# Patient Record
Sex: MALE | Race: WHITE | NOT HISPANIC OR LATINO | Employment: OTHER | URBAN - METROPOLITAN AREA
[De-identification: names, ages, dates, MRNs, and addresses within clinical notes are randomized per-mention and may not be internally consistent; named-entity substitution may affect disease eponyms.]

---

## 2018-07-05 ENCOUNTER — APPOINTMENT (OUTPATIENT)
Dept: GENERAL RADIOLOGY | Facility: HOSPITAL | Age: 58
End: 2018-07-05

## 2018-07-05 ENCOUNTER — HOSPITAL ENCOUNTER (OUTPATIENT)
Facility: HOSPITAL | Age: 58
Setting detail: OBSERVATION
Discharge: HOME | End: 2018-07-09
Attending: EMERGENCY MEDICINE | Admitting: EMERGENCY MEDICINE

## 2018-07-05 DIAGNOSIS — J96.01 ACUTE RESPIRATORY FAILURE WITH HYPOXIA (HCC): Primary | ICD-10-CM

## 2018-07-05 DIAGNOSIS — J44.1 COPD WITH ACUTE EXACERBATION (HCC): ICD-10-CM

## 2018-07-05 LAB
ALBUMIN SERPL-MCNC: 4.5 G/DL (ref 3.5–5.2)
ALBUMIN/GLOB SERPL: 1.5 G/DL
ALP SERPL-CCNC: 58 U/L (ref 39–117)
ALT SERPL W P-5'-P-CCNC: 26 U/L (ref 1–41)
ANION GAP SERPL CALCULATED.3IONS-SCNC: 10.1 MMOL/L
AST SERPL-CCNC: 19 U/L (ref 1–40)
BASOPHILS # BLD AUTO: 0.05 10*3/MM3 (ref 0–0.2)
BASOPHILS NFR BLD AUTO: 0.5 % (ref 0–1.5)
BILIRUB SERPL-MCNC: 0.2 MG/DL (ref 0.1–1.2)
BUN BLD-MCNC: 17 MG/DL (ref 6–20)
BUN/CREAT SERPL: 23.9 (ref 7–25)
CALCIUM SPEC-SCNC: 9.3 MG/DL (ref 8.6–10.5)
CHLORIDE SERPL-SCNC: 98 MMOL/L (ref 98–107)
CO2 SERPL-SCNC: 31.9 MMOL/L (ref 22–29)
CREAT BLD-MCNC: 0.71 MG/DL (ref 0.76–1.27)
DEPRECATED RDW RBC AUTO: 50.4 FL (ref 37–54)
EOSINOPHIL # BLD AUTO: 0.26 10*3/MM3 (ref 0–0.7)
EOSINOPHIL NFR BLD AUTO: 2.7 % (ref 0.3–6.2)
ERYTHROCYTE [DISTWIDTH] IN BLOOD BY AUTOMATED COUNT: 14.6 % (ref 11.5–14.5)
GFR SERPL CREATININE-BSD FRML MDRD: 114 ML/MIN/1.73
GLOBULIN UR ELPH-MCNC: 3 GM/DL
GLUCOSE BLD-MCNC: 85 MG/DL (ref 65–99)
HCT VFR BLD AUTO: 51.1 % (ref 40.4–52.2)
HGB BLD-MCNC: 17.2 G/DL (ref 13.7–17.6)
IMM GRANULOCYTES # BLD: 0.03 10*3/MM3 (ref 0–0.03)
IMM GRANULOCYTES NFR BLD: 0.3 % (ref 0–0.5)
LIPASE SERPL-CCNC: 30 U/L (ref 13–60)
LYMPHOCYTES # BLD AUTO: 2.84 10*3/MM3 (ref 0.9–4.8)
LYMPHOCYTES NFR BLD AUTO: 29.4 % (ref 19.6–45.3)
MCH RBC QN AUTO: 32 PG (ref 27–32.7)
MCHC RBC AUTO-ENTMCNC: 33.7 G/DL (ref 32.6–36.4)
MCV RBC AUTO: 95 FL (ref 79.8–96.2)
MONOCYTES # BLD AUTO: 0.98 10*3/MM3 (ref 0.2–1.2)
MONOCYTES NFR BLD AUTO: 10.1 % (ref 5–12)
NEUTROPHILS # BLD AUTO: 5.54 10*3/MM3 (ref 1.9–8.1)
NEUTROPHILS NFR BLD AUTO: 57.3 % (ref 42.7–76)
NT-PROBNP SERPL-MCNC: 16 PG/ML (ref 5–900)
PLATELET # BLD AUTO: 271 10*3/MM3 (ref 140–500)
PMV BLD AUTO: 9.8 FL (ref 6–12)
POTASSIUM BLD-SCNC: 4.1 MMOL/L (ref 3.5–5.2)
PROT SERPL-MCNC: 7.5 G/DL (ref 6–8.5)
RBC # BLD AUTO: 5.38 10*6/MM3 (ref 4.6–6)
SODIUM BLD-SCNC: 140 MMOL/L (ref 136–145)
TROPONIN T SERPL-MCNC: <0.01 NG/ML (ref 0–0.03)
WBC NRBC COR # BLD: 9.67 10*3/MM3 (ref 4.5–10.7)

## 2018-07-05 PROCEDURE — 94799 UNLISTED PULMONARY SVC/PX: CPT

## 2018-07-05 PROCEDURE — 80053 COMPREHEN METABOLIC PANEL: CPT | Performed by: EMERGENCY MEDICINE

## 2018-07-05 PROCEDURE — 83880 ASSAY OF NATRIURETIC PEPTIDE: CPT | Performed by: EMERGENCY MEDICINE

## 2018-07-05 PROCEDURE — 96374 THER/PROPH/DIAG INJ IV PUSH: CPT

## 2018-07-05 PROCEDURE — 83690 ASSAY OF LIPASE: CPT | Performed by: EMERGENCY MEDICINE

## 2018-07-05 PROCEDURE — 93010 ELECTROCARDIOGRAM REPORT: CPT | Performed by: INTERNAL MEDICINE

## 2018-07-05 PROCEDURE — 84484 ASSAY OF TROPONIN QUANT: CPT | Performed by: EMERGENCY MEDICINE

## 2018-07-05 PROCEDURE — 93005 ELECTROCARDIOGRAM TRACING: CPT | Performed by: EMERGENCY MEDICINE

## 2018-07-05 PROCEDURE — 99284 EMERGENCY DEPT VISIT MOD MDM: CPT

## 2018-07-05 PROCEDURE — 25010000002 METHYLPREDNISOLONE PER 125 MG: Performed by: EMERGENCY MEDICINE

## 2018-07-05 PROCEDURE — G0378 HOSPITAL OBSERVATION PER HR: HCPCS

## 2018-07-05 PROCEDURE — 71046 X-RAY EXAM CHEST 2 VIEWS: CPT

## 2018-07-05 PROCEDURE — 94640 AIRWAY INHALATION TREATMENT: CPT

## 2018-07-05 PROCEDURE — 85025 COMPLETE CBC W/AUTO DIFF WBC: CPT | Performed by: EMERGENCY MEDICINE

## 2018-07-05 RX ORDER — PREDNISONE 20 MG/1
40 TABLET ORAL
Status: DISCONTINUED | OUTPATIENT
Start: 2018-07-06 | End: 2018-07-09 | Stop reason: HOSPADM

## 2018-07-05 RX ORDER — AZITHROMYCIN 250 MG/1
500 TABLET, FILM COATED ORAL ONCE
Status: COMPLETED | OUTPATIENT
Start: 2018-07-05 | End: 2018-07-06

## 2018-07-05 RX ORDER — METHYLPREDNISOLONE SODIUM SUCCINATE 125 MG/2ML
125 INJECTION, POWDER, LYOPHILIZED, FOR SOLUTION INTRAMUSCULAR; INTRAVENOUS ONCE
Status: COMPLETED | OUTPATIENT
Start: 2018-07-05 | End: 2018-07-05

## 2018-07-05 RX ORDER — IPRATROPIUM BROMIDE AND ALBUTEROL SULFATE 2.5; .5 MG/3ML; MG/3ML
3 SOLUTION RESPIRATORY (INHALATION) EVERY 4 HOURS PRN
Status: DISCONTINUED | OUTPATIENT
Start: 2018-07-05 | End: 2018-07-09 | Stop reason: HOSPADM

## 2018-07-05 RX ORDER — IPRATROPIUM BROMIDE AND ALBUTEROL SULFATE 2.5; .5 MG/3ML; MG/3ML
3 SOLUTION RESPIRATORY (INHALATION) ONCE
Status: COMPLETED | OUTPATIENT
Start: 2018-07-05 | End: 2018-07-05

## 2018-07-05 RX ORDER — ACETAMINOPHEN 325 MG/1
650 TABLET ORAL EVERY 4 HOURS PRN
Status: DISCONTINUED | OUTPATIENT
Start: 2018-07-05 | End: 2018-07-09 | Stop reason: HOSPADM

## 2018-07-05 RX ORDER — IPRATROPIUM BROMIDE AND ALBUTEROL SULFATE 2.5; .5 MG/3ML; MG/3ML
3 SOLUTION RESPIRATORY (INHALATION)
Status: DISCONTINUED | OUTPATIENT
Start: 2018-07-05 | End: 2018-07-09 | Stop reason: HOSPADM

## 2018-07-05 RX ORDER — AZITHROMYCIN 250 MG/1
250 TABLET, FILM COATED ORAL
Status: COMPLETED | OUTPATIENT
Start: 2018-07-06 | End: 2018-07-09

## 2018-07-05 RX ORDER — GUAIFENESIN 400 MG/1
400 TABLET ORAL 2 TIMES DAILY
COMMUNITY
End: 2018-09-26 | Stop reason: HOSPADM

## 2018-07-05 RX ORDER — SODIUM CHLORIDE 0.9 % (FLUSH) 0.9 %
10 SYRINGE (ML) INJECTION AS NEEDED
Status: DISCONTINUED | OUTPATIENT
Start: 2018-07-05 | End: 2018-07-09 | Stop reason: HOSPADM

## 2018-07-05 RX ORDER — SODIUM CHLORIDE 0.9 % (FLUSH) 0.9 %
1-10 SYRINGE (ML) INJECTION AS NEEDED
Status: DISCONTINUED | OUTPATIENT
Start: 2018-07-05 | End: 2018-07-09 | Stop reason: HOSPADM

## 2018-07-05 RX ADMIN — IPRATROPIUM BROMIDE AND ALBUTEROL SULFATE 3 ML: .5; 3 SOLUTION RESPIRATORY (INHALATION) at 21:17

## 2018-07-05 RX ADMIN — METHYLPREDNISOLONE SODIUM SUCCINATE 125 MG: 125 INJECTION, POWDER, FOR SOLUTION INTRAMUSCULAR; INTRAVENOUS at 21:06

## 2018-07-05 RX ADMIN — IPRATROPIUM BROMIDE AND ALBUTEROL SULFATE 3 ML: .5; 3 SOLUTION RESPIRATORY (INHALATION) at 23:41

## 2018-07-06 PROCEDURE — 96372 THER/PROPH/DIAG INJ SC/IM: CPT

## 2018-07-06 PROCEDURE — G0378 HOSPITAL OBSERVATION PER HR: HCPCS

## 2018-07-06 PROCEDURE — 94799 UNLISTED PULMONARY SVC/PX: CPT

## 2018-07-06 PROCEDURE — 63710000001 PREDNISONE PER 1 MG: Performed by: INTERNAL MEDICINE

## 2018-07-06 PROCEDURE — 25010000002 ENOXAPARIN PER 10 MG: Performed by: INTERNAL MEDICINE

## 2018-07-06 RX ORDER — NICOTINE 21 MG/24HR
1 PATCH, TRANSDERMAL 24 HOURS TRANSDERMAL EVERY 24 HOURS
Status: DISCONTINUED | OUTPATIENT
Start: 2018-07-06 | End: 2018-07-09 | Stop reason: HOSPADM

## 2018-07-06 RX ADMIN — IPRATROPIUM BROMIDE AND ALBUTEROL SULFATE 3 ML: .5; 3 SOLUTION RESPIRATORY (INHALATION) at 10:22

## 2018-07-06 RX ADMIN — IPRATROPIUM BROMIDE AND ALBUTEROL SULFATE 3 ML: .5; 3 SOLUTION RESPIRATORY (INHALATION) at 07:04

## 2018-07-06 RX ADMIN — PREDNISONE 40 MG: 20 TABLET ORAL at 09:21

## 2018-07-06 RX ADMIN — NICOTINE 1 PATCH: 21 PATCH, EXTENDED RELEASE TRANSDERMAL at 22:10

## 2018-07-06 RX ADMIN — IPRATROPIUM BROMIDE AND ALBUTEROL SULFATE 3 ML: .5; 3 SOLUTION RESPIRATORY (INHALATION) at 14:37

## 2018-07-06 RX ADMIN — ENOXAPARIN SODIUM 40 MG: 40 INJECTION SUBCUTANEOUS at 09:21

## 2018-07-06 RX ADMIN — IPRATROPIUM BROMIDE AND ALBUTEROL SULFATE 3 ML: .5; 3 SOLUTION RESPIRATORY (INHALATION) at 20:45

## 2018-07-06 RX ADMIN — AZITHROMYCIN 500 MG: 250 TABLET, FILM COATED ORAL at 00:18

## 2018-07-06 RX ADMIN — ACETAMINOPHEN 650 MG: 325 TABLET, FILM COATED ORAL at 09:21

## 2018-07-06 RX ADMIN — AZITHROMYCIN 250 MG: 250 TABLET, FILM COATED ORAL at 09:21

## 2018-07-06 NOTE — PAYOR COMM NOTE
"Lucas Paul  (58 y.o. Male)         PLEASE SEE ATTACHED CLINICAL REVIEW. PT. WAS ADMITTED TO [facility] ON 7/5/18.    PLEASE CALL   OR  [phone] WITH INPT AUTH AND DAYS APPROVED.     DXl J96.01    J44.1    THANK YOU    ADARSH CERNA LPN, CCP    Date of Birth Social Security Number Address Home Phone MRN    1960  [id number] [id number] [phone] [id number]    Sabianist Marital Status          None        Admission Date Admission Type Admitting Provider Attending Provider Department, Room/Bed    7/5/18 Emergency Antoni Steinberg MD Haller, Harold [place], MD [address], 613/1    Discharge Date Discharge Disposition Discharge Destination                       Attending Provider:  Antoni Steinberg MD    Allergies:  No Known Allergies    Isolation:  None   Infection:  None   Code Status:  CPR    Ht:  177.8 cm (70\")   Wt:  77 kg (169 lb 12.1 oz)    Admission Cmt:  None   Principal Problem:  None                Active Insurance as of 7/5/2018     Primary Coverage     Payor Plan Insurance Group Employer/Plan Group    [employer] [place] [employer] [place]ICA[place]      Payor Plan Address Payor Plan Phone Number Effective From Effective To    [address] [address] [phone] 7/5/2018     [facility] [id number]       Subscriber Name Subscriber Birth Date Member ID       LUCAS PAUL 1960 [id number]                 Emergency Contacts      (Rel.) Home Phone Work Phone Mobile Phone    Kalyani Childers (Daughter) [phone] -- --               History & Physical      Antoni Steinberg MD at 7/5/2018 10:06 PM                        Patient Care Team:  No Known Provider as PCP - General      Subjective     Patient is a 58 y.o. male.  Asked the emergency room to admit for COPD exacerbation they didn't feel he was safe for discharge home.  He presented with mild hypoxic respiratory failure.  He has a active 2 pack a day smoker.  He follows number " office with Dr. Grey patient reports she's been short of breath for a year and half.  As he is not been sleeping well for over 6 months he can't lay flat on his back he can sleep on his left side and sometimes his right side and he just wakes up at night not particularly short of breath but he wakes up and can't go back to sleep.  He has some cough not producing any sputum he's had increasing shortness of breath.  His voice is been hoarse ever since he started inhalers over year ago.  Apparently when he started inhalers he was using double dose.  Then a thought maybe that was the hoarseness of voice but it has not improved since he has discontinued that he uses Bivespi 2 puffs twice a day as his only inhaler now.  He has never required supplemental oxygen he says he can walk pretty good without having to stop and catch his breath.  His oxygen saturations were 87% at rest in the emergency room today.  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  He also well but for some time.  He had a number of other shu jobs.  He is not aware of any family history of lung disease.  He's had a little bit of chest tightness in his upper chest for the last few days it does come and go some there is no pain shoulder jaw arm or neck.  He is net no lower extremity pain or swelling.  Patient has lost about 8 pounds since January he says if he eats too much he just bloats up and he can't breathe.  Patient reports he has tried multiple medications to stop smoking and none of them help at all he has all of them at home.      Review of Systems:  No headaches or visual changes no history of seizure strokes.  No bad heartburn or indigestion no history of liver disease or hepatitis no ulcers no melena hematochezia or change in bowel habits.  No kidney disease no dysuria hematuria and urgency or frequency no  "heat or cold intolerance or polyuria polydipsia no history of diabetes or thyroid disease.  No history of blood clots easy bleeding or bruising no stiff and arthritis he has noticed no new lumps bumps or nodules.  He doesn't have any problems swallowing no pain when he swallows.  He doesn't use illicit drugs he doesn't drink alcohol he does drink a lot of caffeinated beverages.      History  Past Medical History:   Diagnosis Date   • AAA (abdominal aortic aneurysm) (CMS/[facility])    • COPD (chronic obstructive pulmonary disease) (CMS/[facility])      Past Surgical History:   Procedure Laterality Date   • CHOLECYSTECTOMY       Social History     Social History   • Marital status:      Social History Main Topics   • Smoking status: Current Every Day Smoker     Packs/day: 2.00     Types: Cigarettes   • Alcohol use No   • Drug use: No     Other Topics Concern   • Not on file     History reviewed. No pertinent family history.      Allergies:  Patient has no known allergies.    Medications:  Prior to Admission medications    Not on File             Objective     Vital Signs  Vital Sign Min/Max for last 24 hours  Temp  Min: 98.9 °F (37.2 °C)  Max: 98.9 °F (37.2 °C)   BP  Min: 113/86  Max: 129/86   Pulse  Min: 82  Max: 96   Resp  Min: 18  Max: 22   SpO2  Min: 87 %  Max: 96 %   Flow (L/min)  Min: 2  Max: 2   Weight  Min: 77 kg (169 lb 12.1 oz)  Max: 77 kg (169 lb 12.1 oz)     No intake or output data in the 24 hours ending 07/05/18 2206  No intake/output data recorded.  Last Weight and Admission Weight    1    07/05/18 2036   Weight: 77 kg (169 lb 12.1 oz)     Flowsheet Rows      First Filed Value   Admission Height  177.8 cm (70\") Documented at 07/05/2018 2022   Admission Weight  77 kg (169 lb 12.1 oz) Documented at 07/05/2018 2036          Body mass index is 24.36 kg/m².           Physical Exam:  General Appearance: Well-developed white male he sitting on the stool on 2 L nasal cannula O2 with saturations of 92% he doesn't " look in acute distress.  Eyes: Conjunctiva are clear and anicteric pupils are equal reactive  ENT: Voice is very hoarse.  Oral mucous membranes are moist I see no erythema or exudates.  Conjunctiva are clear and anicteric pupils are equal  Neck: Do not palpate any adenopathy thyromegaly or other masses trachea is midline there is no jugular venous distention or hepatojugular reflux  Lungs: Clear but there is not much air movement at all no wheezes no rales no rhonchi no dullness on percussion chest expansion is symmetric if he talks much he does have to use accessory muscles some  Cardiac: Regular rate and rhythm no murmur or gallop  Abdomen: Soft nontender no palpable organomegaly or masses  : Not examined  Musc/Skel: Grossly normal  Skin: No jaundice, no petechiae, no rashes  Neuro: He is alert oriented cooperative following commands moving all extremities grossly intact  Extremities/P Vascular: No clubbing no cyanosis no edema palpable radial and dorsalis pedis pulses bilaterally  MSE: In reasonably good spirits      Labs:    Results from last 7 days  Lab Units 07/05/18  2042   GLUCOSE mg/dL 85   SODIUM mmol/L 140   POTASSIUM mmol/L 4.1   CO2 mmol/L 31.9*   CHLORIDE mmol/L 98   ANION GAP mmol/L 10.1   CREATININE mg/dL 0.71*   BUN mg/dL 17   BUN / CREAT RATIO  23.9   CALCIUM mg/dL 9.3   EGFR IF NONAFRICN AM mL/min/1.73 114   ALK PHOS U/L 58   TOTAL PROTEIN g/dL 7.5   ALT (SGPT) U/L 26   AST (SGOT) U/L 19   BILIRUBIN mg/dL 0.2   ALBUMIN g/dL 4.50   GLOBULIN gm/dL 3.0   A/G RATIO g/dL 1.5     Estimated Creatinine Clearance: 122 mL/min (A) (by C-G formula based on SCr of 0.71 mg/dL (L)).        Results from last 7 days  Lab Units 07/05/18  2042   WBC 10*3/mm3 9.67   RBC 10*6/mm3 5.38   HEMOGLOBIN g/dL 17.2   HEMATOCRIT % 51.1   MCV fL 95.0   MCH pg 32.0   MCHC g/dL 33.7   RDW % 14.6*   RDW-SD fl 50.4   MPV fL 9.8   PLATELETS 10*3/mm3 271   NEUTROPHIL % % 57.3   LYMPHOCYTE % % 29.4   MONOCYTES % % 10.1   EOSINOPHIL  % % 2.7   BASOPHIL % % 0.5   IMM GRAN % % 0.3   NEUTROS ABS 10*3/mm3 5.54   LYMPHS ABS 10*3/mm3 2.84   MONOS ABS 10*3/mm3 0.98   EOS ABS 10*3/mm3 0.26   BASOS ABS 10*3/mm3 0.05   IMMATURE GRANS (ABS) 10*3/mm3 0.03           Results from last 7 days  Lab Units 07/05/18 2042   TROPONIN T ng/mL <0.010       Results from last 7 days  Lab Units 07/05/18 2042   PROBNP pg/mL 16.0                 Microbiology Results (last 10 days)     ** No results found for the last 240 hours. **            Diagnostics:  Xr Chest 2 View    Result Date: 7/5/2018  PA AND LATERAL CHEST X-RAY  HISTORY: soa  COMPARISON: None.  FINDINGS: PA and lateral views of the chest were obtained. Lungs are fairly well inflated. There are some linear densities in the left lung base which are more suggestive of scarring and/or atelectasis. Some mild fibrotic changes are noted in the lung apices with pleural thickening bilaterally, most likely postinfectious in nature. There is no convincing evidence of active air space disease process otherwise. No edema or significant pleural fluid. Normal heart size. There are calcified residua of granulomatous disease present.        Probable left basilar scarring and/or atelectasis, no active disease  This report was finalized on 7/5/2018 9:29 PM by Curry Gould M.D.           Chest x-ray reviewed no acute disease noted in the chest    Assessment/Plan     1. Acute exacerbation COPD plan steroids bronchodilators and empiric antibiotic use azithromycin for his anti-inflammatory properties as well.  2. Acute hypoxic respiratory failure secondary #1 supplemental O2  3. Tobacco abuse he has his own worst enemy very heavy tobacco abuse he absolutely needs to stop will offer cessation but he declines nicotine patch.  4. Hoarseness of voice.  This has persisted a could be due to his inhalers as he attributes but it's pretty significant and I have suggested he consider ENT evaluation to rule out other causes including  laryngeal tumors.  5. Insomnia not sure this is true insomnia some not sure what the cause is a could be that he is just hypoxic and his breathing is a bad he doesn't really describe classic orthopnea or PND nor does he have snoring or describe much in the way of apneas type symptoms either this could be something such as depression.  I think we treat his breathing and see how he does he may need further evaluation he may even need cardiac evaluation.  6. Weight loss this could be pulmonary cachexia could be others to need to evaluate      Antoni Steinberg MD  07/05/18  10:06 PM    Time:     Electronically signed by Antoni Steinberg MD at 7/5/2018 10:56 PM          Emergency Department Notes      Yury Abad MD at 7/5/2018  8:42 PM           EMERGENCY DEPARTMENT ENCOUNTER    CHIEF COMPLAINT  Chief Complaint: Shortness of air  History given by: patient   History limited by: n/a  Room Number: 12/12  PMD: No Known Provider  Dr Ritter, pulmonology    HPI:  Pt is a 58 y.o. male who presents complaining of SOA that has been worsening for the past 2 months. Pt states that he has difficulty sleeping due to the SOA. Pt also reports a non productive cough that began about 4 days ago. He denies fever, chills, or any other sx. Hx of COPD    Onset: gradual  Timing: constant   Location: respiratory   Radiation: none  Quality: SOA  Intensity/Severity: moderate  Progression: unchanged   Associated Symptoms: productive cough  Aggravating Factors: none  Alleviating Factors: none  Previous Episodes: hx of COPD  Treatment before arrival: none    PAST MEDICAL HISTORY  Active Ambulatory Problems     Diagnosis Date Noted   • No Active Ambulatory Problems     Resolved Ambulatory Problems     Diagnosis Date Noted   • No Resolved Ambulatory Problems     Past Medical History:   Diagnosis Date   • AAA (abdominal aortic aneurysm) (CMS/[facility])    • COPD (chronic obstructive pulmonary disease) (CMS/[facility])        PAST SURGICAL  HISTORY  Past Surgical History:   Procedure Laterality Date   • CHOLECYSTECTOMY         FAMILY HISTORY  History reviewed. No pertinent family history.    SOCIAL HISTORY  Social History     Social History   • Marital status:      Spouse name: N/A   • Number of children: N/A   • Years of education: N/A     Occupational History   • Not on file.     Social History Main Topics   • Smoking status: Current Every Day Smoker     Packs/day: 2.00     Types: Cigarettes   • Smokeless tobacco: Not on file   • Alcohol use No   • Drug use: No   • Sexual activity: Not on file     Other Topics Concern   • Not on file     Social History Narrative   • No narrative on file       ALLERGIES  Patient has no known allergies.    REVIEW OF SYSTEMS  Review of Systems   Constitutional: Negative for activity change, appetite change and fever.   HENT: Negative for congestion and sore throat.    Eyes: Negative.    Respiratory: Positive for cough and shortness of breath.    Cardiovascular: Negative for chest pain and leg swelling.   Gastrointestinal: Negative for abdominal pain, diarrhea and vomiting.   Endocrine: Negative.    Genitourinary: Negative for decreased urine volume and dysuria.   Musculoskeletal: Negative for neck pain.   Skin: Negative for rash and wound.   Allergic/Immunologic: Negative.    Neurological: Negative for weakness, numbness and headaches.   Hematological: Negative.    Psychiatric/Behavioral: Negative.    All other systems reviewed and are negative.      PHYSICAL EXAM  ED Triage Vitals   Temp Heart Rate Resp BP SpO2   07/05/18 2022 07/05/18 2020 07/05/18 2024 07/05/18 2037 07/05/18 2020   98.9 °F (37.2 °C) 92 18 129/86 (!) 87 %      Temp src Heart Rate Source Patient Position BP Location FiO2 (%)   07/05/18 2022 07/05/18 2020 07/05/18 2037 07/05/18 2037 --   Oral Monitor Lying Right arm        Physical Exam   Constitutional: He is oriented to person, place, and time. No distress.   HENT:   Head: Normocephalic and  atraumatic.   Eyes: EOM are normal. Pupils are equal, round, and reactive to light.   Neck: Normal range of motion. Neck supple.   Cardiovascular: Normal rate, regular rhythm and normal heart sounds.    Pulmonary/Chest: Effort normal. No respiratory distress. He has decreased breath sounds.   Abdominal: Soft. There is no tenderness. There is no rebound and no guarding.   Musculoskeletal: Normal range of motion. He exhibits no edema.   Neurological: He is alert and oriented to person, place, and time. He has normal sensation and normal strength.   Skin: Skin is warm and dry.   Psychiatric: Mood and affect normal.   Nursing note and vitals reviewed.           I ordered the above noted radiological studies. Interpreted by radiologist. Reviewed by me in PACS.       PROCEDURES  Procedures    EKG           EKG time: 21:39  Rhythm/Rate: NSR 92  P waves and NM: nml  QRS, axis: nml   ST and T waves: nml     Interpreted Contemporaneously by me, independently viewed  Unchanged compared to prior 9/24/2001    PROGRESS AND CONSULTS        20:31  CMP, CBC, BNP, troponin, lipase, and EKG ordered.    20:44  BP- 129/86 HR- 92 Temp- 98.9 °F (37.2 °C) (Oral) O2 sat- 95% on 2L  Pt's RA sat on arrival to the ED was 87%. Advised pt of the plan for labs and CXR. Pt understands and agrees with the plan, all questions answered.    20:53  Duo-neb and solu-medrol ordered to treat SOA. CXR ordered.     21:42  BP- 129/86 HR- 84 Temp- 98.9 °F (37.2 °C) (Oral) O2 sat- 92% on 2L   Rechecked the patient who is in NAD and is resting comfortably. Pt denies change after breathing treatments. On re-exam, breath sounds remain diminished. Advised pt of the plan for admission as he is requiring supplemental O2. Pt understands and agrees with the plan, all questions answered.    21:47  Call placed to pulmonology for admission    21:54  Discussed pt's case with Dr Steinberg (pulmonology), who agrees to admit the pt to a tele bed.     MEDICAL DECISION  MAKING  Results were reviewed/discussed with the patient and they were also made aware of online access. Pt also made aware that some labs, such as cultures, will not be resulted during ER visit and follow up with PMD is necessary.     MDM  Number of Diagnoses or Management Options     Amount and/or Complexity of Data Reviewed  Clinical lab tests: ordered and reviewed (Troponin is <0.010, BNP=16.0)  Tests in the radiology section of CPT®:  ordered and reviewed (CXR shows NAD)  Tests in the medicine section of CPT®:  ordered and reviewed (See EKG procedure note. )  Discuss the patient with other providers: yes (Dr Steinberg (pulmonology))    Patient Progress  Patient progress: stable         DIAGNOSIS  Final diagnoses:   Acute respiratory failure with hypoxia (CMS/HCC)   COPD with acute exacerbation (CMS/HCC)       DISPOSITION  ADMISSION    Discussed treatment plan and reason for admission with pt/family and admitting physician.  Pt/family voiced understanding of the plan for admission for further testing/treatment as needed.     Latest Documented Vital Signs:  As of 10:39 PM  BP- 114/89 HR- 101 Temp- 98.9 °F (37.2 °C) (Oral) O2 sat- 92%    --  Documentation assistance provided by beto Michel for Dr Abad.  Information recorded by the scribe was done at my direction and has been verified and validated by me.        Francheska Michel  07/05/18 0881       Yury Abad MD  07/05/18 4960      Electronically signed by Yury Abad MD at 7/5/2018 10:39 PM     Maria G Espino RN at 7/5/2018  8:56 PM        Pt in ER 12 c/o worsening SOA over the last several months.  Pt states he is having increasing trouble sleeping at night due to SOA.  Pt states he smokes 2ppd.  Pt asks that we not discuss it.  Pt does not appear to wish to change his smoking habit.    Pt also c/o abdominal bloating.  States he had some blood in his stool yesterday x 2.  Last night stated it was a small amount of dark blood.     Maria G THOMSON  DEBBIE Espino  07/05/18 2058       Maria G Espino RN  07/05/18 2116      Electronically signed by Maria G Espino RN at 7/5/2018  9:16 PM     Maria G Espino RN at 7/5/2018  9:52 PM        Pt resting quietly sitting up in bed.  No acute respiratory distress at this time.  Pt still working slightly to breath when talking.  States no change in breathing with meds and treatment.     Maria G Espino RN  07/05/18 2153      Electronically signed by Maria G Espino RN at 7/5/2018  9:53 PM       Intake & Output (last day)       07/05 0701 - 07/06 0700 07/06 0701 - 07/07 0700    P.O. 200 120    Total Intake(mL/kg) 200 (2.6) 120 (1.6)    Urine (mL/kg/hr) 600     Total Output 600      Net -400 +120          Unmeasured Urine Occurrence 2 x         Lines, Drains & Airways    Active LDAs     Name:   Placement date:   Placement time:   Site:   Days:    Peripheral IV 07/05/18 2038 Left Antecubital  07/05/18 2038    Antecubital    less than 1                  Lab Results (last 24 hours)     Procedure Component Value Units Date/Time    Comprehensive Metabolic Panel [[id number]]  (Abnormal) Collected:  07/05/18 2042    Specimen:  Blood Updated:  07/05/18 2116     Glucose 85 mg/dL      BUN 17 mg/dL      Creatinine 0.71 (L) mg/dL      Sodium 140 mmol/L      Potassium 4.1 mmol/L      Chloride 98 mmol/L      CO2 31.9 (H) mmol/L      Calcium 9.3 mg/dL      Total Protein 7.5 g/dL      Albumin 4.50 g/dL      ALT (SGPT) 26 U/L      AST (SGOT) 19 U/L      Alkaline Phosphatase 58 U/L      Total Bilirubin 0.2 mg/dL      eGFR Non African Amer 114 mL/min/1.73      Globulin 3.0 gm/dL      A/G Ratio 1.5 g/dL      BUN/Creatinine Ratio 23.9     Anion Gap 10.1 mmol/L     Troponin [[id number]]  (Normal) Collected:  07/05/18 2042    Specimen:  Blood Updated:  07/05/18 2116     Troponin T <0.010 ng/mL     Narrative:       Troponin T Reference Ranges:  Less than 0.03 ng/mL:    Negative for AMI  0.03 to 0.09 ng/mL:      Indeterminant for AMI  Greater than 0.09  ng/mL: Positive for AMI    Lipase [[id number]]  (Normal) Collected:  07/05/18 2042    Specimen:  Blood Updated:  07/05/18 2116     Lipase 30 U/L     BNP [[id number]]  (Normal) Collected:  07/05/18 2042    Specimen:  Blood Updated:  07/05/18 2114     proBNP 16.0 pg/mL     Narrative:       Among patients with dyspnea, NT-proBNP is highly sensitive for the detection of acute congestive heart failure. In addition NT-proBNP of <300 pg/ml effectively rules out acute congestive heart failure with 99% negative predictive value.    CBC & Differential [[id number]] Collected:  07/05/18 2042    Specimen:  Blood Updated:  07/05/18 2053    Narrative:       The following orders were created for panel order CBC & Differential.  Procedure                               Abnormality         Status                     ---------                               -----------         ------                     CBC Auto Differential[[id number]]        Abnormal            Final result                 Please view results for these tests on the individual orders.    CBC Auto Differential [[id number]]  (Abnormal) Collected:  07/05/18 2042    Specimen:  Blood Updated:  07/05/18 2053     WBC 9.67 10*3/mm3      RBC 5.38 10*6/mm3      Hemoglobin 17.2 g/dL      Hematocrit 51.1 %      MCV 95.0 fL      MCH 32.0 pg      MCHC 33.7 g/dL      RDW 14.6 (H) %      RDW-SD 50.4 fl      MPV 9.8 fL      Platelets 271 10*3/mm3      Neutrophil % 57.3 %      Lymphocyte % 29.4 %      Monocyte % 10.1 %      Eosinophil % 2.7 %      Basophil % 0.5 %      Immature Grans % 0.3 %      Neutrophils, Absolute 5.54 10*3/mm3      Lymphocytes, Absolute 2.84 10*3/mm3      Monocytes, Absolute 0.98 10*3/mm3      Eosinophils, Absolute 0.26 10*3/mm3      Basophils, Absolute 0.05 10*3/mm3      Immature Grans, Absolute 0.03 10*3/mm3         Imaging Results (last 24 hours)     Procedure Component Value Units Date/Time    XR Chest 2 View [[id number]] Collected:  07/05/18 2128     Updated:   07/05/18 2132    Narrative:       PA AND LATERAL CHEST X-RAY     HISTORY: soa     COMPARISON: None.     FINDINGS: PA and lateral views of the chest were obtained. Lungs are  fairly well inflated. There are some linear densities in the left lung  base which are more suggestive of scarring and/or atelectasis. Some mild  fibrotic changes are noted in the lung apices with pleural thickening  bilaterally, most likely postinfectious in nature. There is no  convincing evidence of active air space disease process otherwise. No  edema or significant pleural fluid. Normal heart size. There are  calcified residua of granulomatous disease present.             Impression:       Probable left basilar scarring and/or atelectasis, no active  disease     This report was finalized on 7/5/2018 9:29 PM by Curry Gould M.D.           ECG/EMG Results (last 24 hours)     Procedure Component Value Units Date/Time    ECG 12 Lead [[id number]] Collected:  07/05/18 2139     Updated:  07/05/18 2140          Orders (last 24 hrs)     Start     Ordered    07/06/18 0900  azithromycin (ZITHROMAX) tablet 250 mg  Every 24 Hours Scheduled      07/05/18 2300 07/06/18 0800  enoxaparin (LOVENOX) syringe 40 mg  Every 24 Hours      07/05/18 2258 07/06/18 0800  predniSONE (DELTASONE) tablet 40 mg  Daily With Breakfast      07/05/18 2300 07/06/18 0437  Inpatient ENT Consult  Once     Specialty:  Otolaryngology  Provider:  Johnny Redmond MD    07/06/18 0437    07/06/18 0000  Vital Signs  Every 4 Hours      07/05/18 2258 07/05/18 2345  ipratropium-albuterol (DUO-NEB) nebulizer solution 3 mL  4 Times Daily - RT      07/05/18 2300 07/05/18 2345  azithromycin (ZITHROMAX) tablet 500 mg  Once      07/05/18 2300    07/05/18 2300  Strict Intake and Output  Every Hour      07/05/18 2258 07/05/18 2258  ipratropium-albuterol (DUO-NEB) nebulizer solution 3 mL  Every 4 Hours PRN      07/05/18 2300    07/05/18 2258  Code Status and Medical  Interventions:  Continuous      07/05/18 2258 07/05/18 2258  VTE Risk Assessment - Moderate Risk  Once      07/05/18 2258 07/05/18 2258  Mechanical VTE Prophylaxis Not Indicated: Currently Anticoagulated  Once      07/05/18 2258 07/05/18 2258  Diet Regular  Diet Effective Now      07/05/18 2258 07/05/18 2257  acetaminophen (TYLENOL) tablet 650 mg  Every 4 Hours PRN      07/05/18 2258 07/05/18 2257  Weigh Patient  Once      07/05/18 2258 07/05/18 2257  Oxygen Therapy- Nasal Cannula; Titrate for SPO2: 90%  Continuous      07/05/18 2258 07/05/18 2257  Insert Peripheral IV  Once      07/05/18 2258 07/05/18 2257  Saline Lock & Maintain IV Access  Continuous      07/05/18 2258 07/05/18 2256  sodium chloride 0.9 % flush 1-10 mL  As Needed      07/05/18 2258 07/05/18 2208  Inpatient Admission  Once      07/05/18 2208 07/05/18 2148  Pulmonology (on-call MD unless specified)  Once     Specialty:  Pulmonary Disease  Provider:  Antoni Steinberg MD    07/05/18 2147 07/05/18 2055  ipratropium-albuterol (DUO-NEB) nebulizer solution 3 mL  Once      07/05/18 2053 07/05/18 2055  methylPREDNISolone sodium succinate (SOLU-Medrol) injection 125 mg  Once      07/05/18 2053 07/05/18 2054  XR Chest 2 View  1 Time Imaging      07/05/18 2053 07/05/18 2032  CBC & Differential  Once      07/05/18 2031 07/05/18 2032  Comprehensive Metabolic Panel  Once      07/05/18 2031 07/05/18 2032  ECG 12 Lead  Once      07/05/18 2031 07/05/18 2032  Insert peripheral IV  Once      07/05/18 2031 07/05/18 2032  BNP  Once      07/05/18 2031 07/05/18 2032  Troponin  Once      07/05/18 2031 07/05/18 2032  CBC Auto Differential  PROCEDURE ONCE      07/05/18 2031 07/05/18 2032  Lipase  Once      07/05/18 2031 07/05/18 2031  sodium chloride 0.9 % flush 10 mL  As Needed      07/05/18 2031    --  guaiFENesin (MUCUS RELIEF) 400 MG tablet  2 Times Daily      07/05/18 2300    --   Glycopyrrolate-Formoterol (BEVESPI AEROSPHERE) 9-4.8 MCG/ACT aerosol  2 Times Daily      07/05/18 2300             Physician Progress Notes (all)      Antoni Steinberg MD at 7/6/2018  4:38 AM                   LOS: 1 day   Patient Care Team:  No Known Provider as PCP - General    Subjective     he notes he's a little better he was able to sleep some last night still cough but not bad.  And maybe not as short of breath    Review of Systems:         Objective     Vital Signs  Vital Sign Min/Max for last 24 hours  Temp  Min: 98.1 °F (36.7 °C)  Max: 98.9 °F (37.2 °C)   BP  Min: 113/86  Max: 129/86   Pulse  Min: 82  Max: 103   Resp  Min: 18  Max: 26   SpO2  Min: 87 %  Max: 96 %   Flow (L/min)  Min: 2  Max: 2   Weight  Min: 77 kg (169 lb 12.1 oz)  Max: 77 kg (169 lb 12.1 oz)        Ventilator/Non-Invasive Ventilation Settings     None                       Body mass index is 24.36 kg/m².  No intake/output data recorded.  I/O this shift:  In: 200 [P.O.:200]  Out: 300 [Urine:300]        Physical Exam:    General Appearance: Well-developed white male he sitting on the stool on 2 L nasal cannula O2 with saturations of 90% he doesn't look in acute distress.  Eyes: Conjunctiva are clear and anicteric pupils are equal reactive  ENT: Voice is very hoarse.  Oral mucous membranes are moist I see no erythema or exudates.  Conjunctiva are clear and anicteric pupils are equal  Neck: Do not palpate any adenopathy thyromegaly or other masses trachea is midline there is no jugular venous distention or hepatojugular reflux  Lungs: Clear but there is not much air movement at all no wheezes no rales no rhonchi no dullness on percussion chest expansion is symmetric if he talks much he does have to use accessory muscles some  Cardiac: Regular rate and rhythm no murmur or gallop  Abdomen: Soft nontender no palpable organomegaly or masses  : Not examined  Musc/Skel: Grossly normal  Skin: No jaundice, no petechiae, no rashes  Neuro: He is  alert oriented cooperative following commands moving all extremities grossly intact  Extremities/P Vascular: No clubbing no cyanosis no edema palpable radial and dorsalis pedis pulses bilaterally  MSE: In reasonably good spirits     Labs:    Results from last 7 days  Lab Units 07/05/18 2042   GLUCOSE mg/dL 85   SODIUM mmol/L 140   POTASSIUM mmol/L 4.1   CO2 mmol/L 31.9*   CHLORIDE mmol/L 98   ANION GAP mmol/L 10.1   CREATININE mg/dL 0.71*   BUN mg/dL 17   BUN / CREAT RATIO  23.9   CALCIUM mg/dL 9.3   EGFR IF NONAFRICN AM mL/min/1.73 114   ALK PHOS U/L 58   TOTAL PROTEIN g/dL 7.5   ALT (SGPT) U/L 26   AST (SGOT) U/L 19   BILIRUBIN mg/dL 0.2   ALBUMIN g/dL 4.50   GLOBULIN gm/dL 3.0   A/G RATIO g/dL 1.5     Estimated Creatinine Clearance: 122 mL/min (A) (by C-G formula based on SCr of 0.71 mg/dL (L)).        Results from last 7 days  Lab Units 07/05/18 2042   WBC 10*3/mm3 9.67   RBC 10*6/mm3 5.38   HEMOGLOBIN g/dL 17.2   HEMATOCRIT % 51.1   MCV fL 95.0   MCH pg 32.0   MCHC g/dL 33.7   RDW % 14.6*   RDW-SD fl 50.4   MPV fL 9.8   PLATELETS 10*3/mm3 271   NEUTROPHIL % % 57.3   LYMPHOCYTE % % 29.4   MONOCYTES % % 10.1   EOSINOPHIL % % 2.7   BASOPHIL % % 0.5   IMM GRAN % % 0.3   NEUTROS ABS 10*3/mm3 5.54   LYMPHS ABS 10*3/mm3 2.84   MONOS ABS 10*3/mm3 0.98   EOS ABS 10*3/mm3 0.26   BASOS ABS 10*3/mm3 0.05   IMMATURE GRANS (ABS) 10*3/mm3 0.03           Results from last 7 days  Lab Units 07/05/18 2042   TROPONIN T ng/mL <0.010       Results from last 7 days  Lab Units 07/05/18 2042   PROBNP pg/mL 16.0                 Microbiology Results (last 10 days)     ** No results found for the last 240 hours. **                azithromycin 250 mg Oral Q24H   enoxaparin 40 mg Subcutaneous Q24H   ipratropium-albuterol 3 mL Nebulization 4x Daily - RT   predniSONE 40 mg Oral Daily With Breakfast          Diagnostics:  Xr Chest 2 View    Result Date: 7/5/2018  PA AND LATERAL CHEST X-RAY  HISTORY: soa  COMPARISON: None.  FINDINGS: PA  and lateral views of the chest were obtained. Lungs are fairly well inflated. There are some linear densities in the left lung base which are more suggestive of scarring and/or atelectasis. Some mild fibrotic changes are noted in the lung apices with pleural thickening bilaterally, most likely postinfectious in nature. There is no convincing evidence of active air space disease process otherwise. No edema or significant pleural fluid. Normal heart size. There are calcified residua of granulomatous disease present.        Probable left basilar scarring and/or atelectasis, no active disease  This report was finalized on 7/5/2018 9:29 PM by Curry Gould M.D.               Active Hospital Problems    Diagnosis Date Noted   • Acute respiratory failure with hypoxia (CMS/[facility]) [J96.01] 07/05/2018      Resolved Hospital Problems    Diagnosis Date Noted Date Resolved   No resolved problems to display.         Assessment/Plan     1. Acute exacerbation COPD plan steroids bronchodilators and empiric antibiotic use azithromycin for  anti-inflammatory properties as well.  2. Acute hypoxic respiratory failure secondary #1 supplemental O2  3. Tobacco abuse he has his own worst enemy very heavy tobacco abuse he absolutely needs to stop will offer cessation but he declines nicotine patch.  4. Hoarseness of voice.  This has persisted  could be due to his inhalers as he attributes but it's pretty significant and I have  ordered ENT evaluation to rule out other causes including laryngeal tumors.  5. Insomnia not sure this is true insomnia some not sure what the cause is a could be that he is just hypoxic and his breathing is  Bad but  he doesn't really describe classic orthopnea or PND nor does he have snoring or describe much in the way of apneas type symptoms either this could be something such as depression.  I think we treat his breathing and see how he does he may need further evaluation he may even need cardiac evaluation. Or  sleep study.  Last night with supplemental O2 he slept better.  It could be that he is not hypoxic at night.  Before discharge she probably should have a nocturnal oximetry study  6. Weight loss this could be pulmonary cachexia could be others  need to monitor    Plan for disposition:    Antoni Steinberg MD  07/06/18  4:38 AM    Time:       Electronically signed by Antoni Steinberg MD at 7/6/2018  5:58 AM          Consult Notes (all)      Johnny Redmond MD at 7/6/2018 11:12 AM      Consult Orders:    1. Inpatient ENT Consult [[id number]] ordered by Antoni Steinberg MD at 07/06/18 0437              Asked to see patient regarding hoarseness.    HPI: 58-year-old gentleman admitted with an acute COPD exacerbation.  He was noted to have significant hoarseness.  He has a robust smoking history.  He has not had any hemoptysis or hematemesis.  He has not observed any neck masses.  He is currently using nasal cannula oxygen therapy while in the hospital.  I reviewed the electronic medical record for pertinent PMH, PSH, family history, social history and review of systems.    PE: Well-nourished gentleman in no apparent cardiopulmonary distress.  The nasal cannula oxygen was in place today.  He does have crookedness of the septum.  No nasal discharge was noted.  The oral cavity and oropharynx were unremarkable.  The neck revealed no adenopathy or masses.  He has significant raspiness and pressured tone to his voice.  I applied Afrin and lidocaine spray in order to pass the flexible scope.  I passed the flexible scope through the right nasal passage.  No nasopharyngeal masses were noted.  The left reveal both vocal folds be mobile but he was doing a fairly severe amount of false vocal fold compression.  There were no worrisome masses or lesions noted including in the hypopharynx and vallecula.    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  I’m sure his smoking  does not help him with this whatsoever.  However, I suspect he learned these maladaptive techniques from some acute episode of laryngitis and trying to continue to produce a voice.  I suspect he would benefit from voice therapy as an outpatient.    EMR Dragon/Transcription disclaimer:   Much of this encounter note is an electronic transcription/translation of spoken language to printed text. The electronic translation of spoken language may permit erroneous, or at times, nonsensical words or phrases to be inadvertently transcribed; Although I have reviewed the note for such errors, some may still exist.       Electronically signed by Johnny Redmond MD at 7/6/2018 11:13 AM         All medication doses during the admission are shown, including meds that are no longer on order.   Scheduled Meds Sorted by Name   for Tino Paul as of 7/4/18 through 7/6/18     1 Day 3 Days 7 Days 10 Days < Today >    Legend:                          Inactive     Active     Other Encounter    Linked               Medications 07/04/18 07/05/18 07/06/18   azithromycin (ZITHROMAX) tablet 250 mg  Dose: 250 mg  Freq: Every 24 Hours Scheduled Route: PO  Indications Comment: AECOPD  Start: 07/06/18 0900 End: 07/10/18 0859    Admin Instructions:   Do not administer concurrently with aluminum or magnesium antacids. Take with food if GI upset occurs.      0921            azithromycin (ZITHROMAX) tablet 500 mg  Dose: 500 mg  Freq: Once Route: PO  Indications Comment: AECOPD  Start: 07/05/18 2345 End: 07/06/18 0018    Admin Instructions:   Do not administer concurrently with aluminum or magnesium antacids. Take with food if GI upset occurs.      0018            enoxaparin (LOVENOX) syringe 40 mg  Dose: 40 mg  Freq: Every 24 Hours Route: SC  Indications of Use: PROPHYLAXIS OF VENOUS THROMBOEMBOLISM  Start: 07/06/18 0800    Admin Instructions:   Give subcutaneous in abdomen only. Do not massage site after injection. Do not change dose time until  after 48 hrs.      0921            ipratropium-albuterol (DUO-NEB) nebulizer solution 3 mL  Dose: 3 mL  Freq: 4 Times Daily - RT Route: NEBULIZATION  Start: 07/05/18 2345     2341          0704   1022      1630   2030          ipratropium-albuterol (DUO-NEB) nebulizer solution 3 mL  Dose: 3 mL  Freq: Once Route: NEBULIZATION  Start: 07/05/18 2055 End: 07/05/18 2117 2117             methylPREDNISolone sodium succinate (SOLU-Medrol) injection 125 mg  Dose: 125 mg  Freq: Once Route: IV  Start: 07/05/18 2055 End: 07/05/18 2106    Admin Instructions:   Caution: Look alike/sound alike drug alert     2106             predniSONE (DELTASONE) tablet 40 mg  Dose: 40 mg  Freq: Daily With Breakfast Route: PO  Start: 07/06/18 0800    Admin Instructions:   Take with food.      0921            Medications 07/04/18 07/05/18 07/06/18       Continuous Meds Sorted by Name   for Tino Paul as of 7/4/18 through 7/6/18    Legend:                          Inactive     Active     Other Encounter    Linked               Medications 07/04/18 07/05/18 07/06/18       PRN Meds Sorted by Name   for Tino Paul as of 7/4/18 through 7/6/18    Legend:                          Inactive     Active     Other Encounter    Linked               Medications 07/04/18 07/05/18 07/06/18   acetaminophen (TYLENOL) tablet 650 mg  Dose: 650 mg  Freq: Every 4 Hours PRN Route: PO  PRN Reason: Mild Pain   Start: 07/05/18 2257    Admin Instructions:   Do not exceed 4 grams of acetaminophen in a 24 hr period.    If given for pain, use the following pain scale:   Mild Pain = Pain Score of 1-3, CPOT 1-2  Moderate Pain = Pain Score of 4-6, CPOT 3-4  Severe Pain = Pain Score of 7-10, CPOT 5-8      0921            ipratropium-albuterol (DUO-NEB) nebulizer solution 3 mL  Dose: 3 mL  Freq: Every 4 Hours PRN Route: NEBULIZATION  PRN Reasons: Wheezing,Shortness of Air  Start: 07/05/18 2258         sodium chloride 0.9 % flush 1-10 mL  Dose: 1-10 mL  Freq: As Needed Route:  IV  PRN Reason: Line Care  Start: 07/05/18 2256         sodium chloride 0.9 % flush 10 mL  Dose: 10 mL  Freq: As Needed Route: IV  PRN Reason: Line Care  Start: 07/05/18 2031

## 2018-07-06 NOTE — H&P
Patient Care Team:  No Known Provider as PCP - General      Subjective     Patient is a 58 y.o. male.  Asked the emergency room to admit for COPD exacerbation they didn't feel he was safe for discharge home.  He presented with mild hypoxic respiratory failure.  He has a active 2 pack a day smoker.  He follows number office with Dr. Grey patient reports she's been short of breath for a year and half.  As he is not been sleeping well for over 6 months he can't lay flat on his back he can sleep on his left side and sometimes his right side and he just wakes up at night not particularly short of breath but he wakes up and can't go back to sleep.  He has some cough not producing any sputum he's had increasing shortness of breath.  His voice is been hoarse ever since he started inhalers over year ago.  Apparently when he started inhalers he was using double dose.  Then a thought maybe that was the hoarseness of voice but it has not improved since he has discontinued that he uses Bivespi 2 puffs twice a day as his only inhaler now.  He has never required supplemental oxygen he says he can walk pretty good without having to stop and catch his breath.  His oxygen saturations were 87% at rest in the emergency room today.  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  He also well but for some time.  He had a number of other shu jobs.  He is not aware of any family history of lung disease.  He's had a little bit of chest tightness in his upper chest for the last few days it does come and go some there is no pain shoulder jaw arm or neck.  He is net no lower extremity pain or swelling.  Patient has lost about 8 pounds since January he says if he eats too much he just bloats up and he can't breathe.  Patient reports he has tried multiple medications to stop smoking and none of  them help at all he has all of them at home.      Review of Systems:  No headaches or visual changes no history of seizure strokes.  No bad heartburn or indigestion no history of liver disease or hepatitis no ulcers no melena hematochezia or change in bowel habits.  No kidney disease no dysuria hematuria and urgency or frequency no heat or cold intolerance or polyuria polydipsia no history of diabetes or thyroid disease.  No history of blood clots easy bleeding or bruising no stiff and arthritis he has noticed no new lumps bumps or nodules.  He doesn't have any problems swallowing no pain when he swallows.  He doesn't use illicit drugs he doesn't drink alcohol he does drink a lot of caffeinated beverages.      History  Past Medical History:   Diagnosis Date   • AAA (abdominal aortic aneurysm) (CMS/[facility])    • COPD (chronic obstructive pulmonary disease) (CMS/[facility])      Past Surgical History:   Procedure Laterality Date   • CHOLECYSTECTOMY       Social History     Social History   • Marital status:      Social History Main Topics   • Smoking status: Current Every Day Smoker     Packs/day: 2.00     Types: Cigarettes   • Alcohol use No   • Drug use: No     Other Topics Concern   • Not on file     History reviewed. No pertinent family history.      Allergies:  Patient has no known allergies.    Medications:  Prior to Admission medications    Not on File             Objective     Vital Signs  Vital Sign Min/Max for last 24 hours  Temp  Min: 98.9 °F (37.2 °C)  Max: 98.9 °F (37.2 °C)   BP  Min: 113/86  Max: 129/86   Pulse  Min: 82  Max: 96   Resp  Min: 18  Max: 22   SpO2  Min: 87 %  Max: 96 %   Flow (L/min)  Min: 2  Max: 2   Weight  Min: 77 kg (169 lb 12.1 oz)  Max: 77 kg (169 lb 12.1 oz)     No intake or output data in the 24 hours ending 07/05/18 2206  No intake/output data recorded.  Last Weight and Admission Weight    1    07/05/18 2036   Weight: 77 kg (169 lb 12.1 oz)     Flowsheet Rows      First Filed Value  "  Admission Height  177.8 cm (70\") Documented at 07/05/2018 2022   Admission Weight  77 kg (169 lb 12.1 oz) Documented at 07/05/2018 2036          Body mass index is 24.36 kg/m².           Physical Exam:  General Appearance: Well-developed white male he sitting on the stool on 2 L nasal cannula O2 with saturations of 92% he doesn't look in acute distress.  Eyes: Conjunctiva are clear and anicteric pupils are equal reactive  ENT: Voice is very hoarse.  Oral mucous membranes are moist I see no erythema or exudates.  Conjunctiva are clear and anicteric pupils are equal  Neck: Do not palpate any adenopathy thyromegaly or other masses trachea is midline there is no jugular venous distention or hepatojugular reflux  Lungs: Clear but there is not much air movement at all no wheezes no rales no rhonchi no dullness on percussion chest expansion is symmetric if he talks much he does have to use accessory muscles some  Cardiac: Regular rate and rhythm no murmur or gallop  Abdomen: Soft nontender no palpable organomegaly or masses  : Not examined  Musc/Skel: Grossly normal  Skin: No jaundice, no petechiae, no rashes  Neuro: He is alert oriented cooperative following commands moving all extremities grossly intact  Extremities/P Vascular: No clubbing no cyanosis no edema palpable radial and dorsalis pedis pulses bilaterally  MSE: In reasonably good spirits      Labs:    Results from last 7 days  Lab Units 07/05/18 2042   GLUCOSE mg/dL 85   SODIUM mmol/L 140   POTASSIUM mmol/L 4.1   CO2 mmol/L 31.9*   CHLORIDE mmol/L 98   ANION GAP mmol/L 10.1   CREATININE mg/dL 0.71*   BUN mg/dL 17   BUN / CREAT RATIO  23.9   CALCIUM mg/dL 9.3   EGFR IF NONAFRICN AM mL/min/1.73 114   ALK PHOS U/L 58   TOTAL PROTEIN g/dL 7.5   ALT (SGPT) U/L 26   AST (SGOT) U/L 19   BILIRUBIN mg/dL 0.2   ALBUMIN g/dL 4.50   GLOBULIN gm/dL 3.0   A/G RATIO g/dL 1.5     Estimated Creatinine Clearance: 122 mL/min (A) (by C-G formula based on SCr of 0.71 mg/dL " (L)).        Results from last 7 days  Lab Units 07/05/18 2042   WBC 10*3/mm3 9.67   RBC 10*6/mm3 5.38   HEMOGLOBIN g/dL 17.2   HEMATOCRIT % 51.1   MCV fL 95.0   MCH pg 32.0   MCHC g/dL 33.7   RDW % 14.6*   RDW-SD fl 50.4   MPV fL 9.8   PLATELETS 10*3/mm3 271   NEUTROPHIL % % 57.3   LYMPHOCYTE % % 29.4   MONOCYTES % % 10.1   EOSINOPHIL % % 2.7   BASOPHIL % % 0.5   IMM GRAN % % 0.3   NEUTROS ABS 10*3/mm3 5.54   LYMPHS ABS 10*3/mm3 2.84   MONOS ABS 10*3/mm3 0.98   EOS ABS 10*3/mm3 0.26   BASOS ABS 10*3/mm3 0.05   IMMATURE GRANS (ABS) 10*3/mm3 0.03           Results from last 7 days  Lab Units 07/05/18 2042   TROPONIN T ng/mL <0.010       Results from last 7 days  Lab Units 07/05/18 2042   PROBNP pg/mL 16.0                 Microbiology Results (last 10 days)     ** No results found for the last 240 hours. **            Diagnostics:  Xr Chest 2 View    Result Date: 7/5/2018  PA AND LATERAL CHEST X-RAY  HISTORY: soa  COMPARISON: None.  FINDINGS: PA and lateral views of the chest were obtained. Lungs are fairly well inflated. There are some linear densities in the left lung base which are more suggestive of scarring and/or atelectasis. Some mild fibrotic changes are noted in the lung apices with pleural thickening bilaterally, most likely postinfectious in nature. There is no convincing evidence of active air space disease process otherwise. No edema or significant pleural fluid. Normal heart size. There are calcified residua of granulomatous disease present.        Probable left basilar scarring and/or atelectasis, no active disease  This report was finalized on 7/5/2018 9:29 PM by Curry Gould M.D.           Chest x-ray reviewed no acute disease noted in the chest    Assessment/Plan     1. Acute exacerbation COPD plan steroids bronchodilators and empiric antibiotic use azithromycin for his anti-inflammatory properties as well.  2. Acute hypoxic respiratory failure secondary #1 supplemental O2  3. Tobacco abuse he  has his own worst enemy very heavy tobacco abuse he absolutely needs to stop will offer cessation but he declines nicotine patch.  4. Hoarseness of voice.  This has persisted a could be due to his inhalers as he attributes but it's pretty significant and I have suggested he consider ENT evaluation to rule out other causes including laryngeal tumors.  5. Insomnia not sure this is true insomnia some not sure what the cause is a could be that he is just hypoxic and his breathing is a bad he doesn't really describe classic orthopnea or PND nor does he have snoring or describe much in the way of apneas type symptoms either this could be something such as depression.  I think we treat his breathing and see how he does he may need further evaluation he may even need cardiac evaluation.  6. Weight loss this could be pulmonary cachexia could be others to need to evaluate      Antoni Steinberg MD  07/05/18  10:06 PM    Time:

## 2018-07-06 NOTE — PROGRESS NOTES
Discharge Planning Assessment  [facility]     Patient Name: Tino Paul  MRN: [id number]  Today's Date: 7/6/2018    Admit Date: 7/5/2018          Discharge Needs Assessment     Row Name 07/06/18 1523       Living Environment    Lives With alone    Current Living Arrangements home/apartment/condo    Primary Care Provided by self    Provides Primary Care For no one    Family Caregiver if Needed child(heydi), adult    Family Caregiver Names Yenny jasmine     Quality of Family Relationships helpful;involved;supportive    Able to Return to Prior Arrangements yes       Resource/Environmental Concerns    Resource/Environmental Concerns none    Transportation Concerns car, none       Transition Planning    Patient/Family Anticipates Transition to home    Patient/Family Anticipated Services at Transition none    Transportation Anticipated family or friend will provide       Discharge Needs Assessment    Concerns to be Addressed discharge planning    Discharge Coordination/Progress Home            Discharge Plan     Row Name 07/06/18 1525       Plan    Plan Home    Patient/Family in Agreement with Plan yes    Plan Comments CCP met with pt to discuss d/c planning. Facesheet verified. CCP role explained. Pt resides alone in a second floor apartment with 20 exterior steps. Pt denies DME use, and reports no past home health or sub-acute rehab. Pt's pharmacy is [facility] in [place], [place]. Pt uses [facility] in [place] for his PCP because they accept his [employer] Medicaid insurance. Pt agreeable to use [facility] if DME needed at d/c. CCP to follow to assist with d/c planning. Rika Sol [facility]        Destination     No service coordination in this encounter.      Durable Medical Equipment     No service coordination in this encounter.      Dialysis/Infusion     No service coordination in this encounter.      Home Medical Care     No service coordination in this encounter.      Social Care     No service  coordination in this encounter.                Demographic Summary     Row Name 07/06/18 1522       General Information    Admission Type inpatient    Arrived From home    Referral Source nursing;physician    Reason for Consult discharge planning    Preferred Language English            Functional Status     Row Name 07/06/18 1523       Functional Status    Usual Activity Tolerance good    Current Activity Tolerance good       Functional Status, IADL    Medications independent    Meal Preparation independent    Housekeeping independent    Laundry independent    Shopping independent       Mental Status Summary    Recent Changes in Mental Status/Cognitive Functioning no changes            Psychosocial    No documentation.           Abuse/Neglect    No documentation.           Legal    No documentation.           Substance Abuse    No documentation.           Patient Forms    No documentation.         Claritza Sol LCSW

## 2018-07-06 NOTE — ED NOTES
Pt resting quietly sitting up in bed.  No acute respiratory distress at this time.  Pt still working slightly to breath when talking.  States no change in breathing with meds and treatment.     Maria G Espino RN  07/05/18 6960

## 2018-07-06 NOTE — CONSULTS
Asked to see patient regarding hoarseness.    HPI: 58-year-old gentleman admitted with an acute COPD exacerbation.  He was noted to have significant hoarseness.  He has a robust smoking history.  He has not had any hemoptysis or hematemesis.  He has not observed any neck masses.  He is currently using nasal cannula oxygen therapy while in the hospital.  I reviewed the electronic medical record for pertinent PMH, PSH, family history, social history and review of systems.    PE: Well-nourished gentleman in no apparent cardiopulmonary distress.  The nasal cannula oxygen was in place today.  He does have crookedness of the septum.  No nasal discharge was noted.  The oral cavity and oropharynx were unremarkable.  The neck revealed no adenopathy or masses.  He has significant raspiness and pressured tone to his voice.  I applied Afrin and lidocaine spray in order to pass the flexible scope.  I passed the flexible scope through the right nasal passage.  No nasopharyngeal masses were noted.  The left reveal both vocal folds be mobile but he was doing a fairly severe amount of false vocal fold compression.  There were no worrisome masses or lesions noted including in the hypopharynx and vallecula.    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  I’m sure his smoking does not help him with this whatsoever.  However, I suspect he learned these maladaptive techniques from some acute episode of laryngitis and trying to continue to produce a voice.  I suspect he would benefit from voice therapy as an outpatient.    EMR Dragon/Transcription disclaimer:   Much of this encounter note is an electronic transcription/translation of spoken language to printed text. The electronic translation of spoken language may permit erroneous, or at times, nonsensical words or phrases to be inadvertently transcribed; Although I have reviewed the note for such errors, some may still exist.

## 2018-07-06 NOTE — ED PROVIDER NOTES
EMERGENCY DEPARTMENT ENCOUNTER    CHIEF COMPLAINT  Chief Complaint: Shortness of air  History given by: patient   History limited by: n/a  Room Number: 12/12  PMD: No Known Provider  Dr Ritter, pulmonology    HPI:  Pt is a 58 y.o. male who presents complaining of SOA that has been worsening for the past 2 months. Pt states that he has difficulty sleeping due to the SOA. Pt also reports a non productive cough that began about 4 days ago. He denies fever, chills, or any other sx. Hx of COPD    Onset: gradual  Timing: constant   Location: respiratory   Radiation: none  Quality: SOA  Intensity/Severity: moderate  Progression: unchanged   Associated Symptoms: productive cough  Aggravating Factors: none  Alleviating Factors: none  Previous Episodes: hx of COPD  Treatment before arrival: none    PAST MEDICAL HISTORY  Active Ambulatory Problems     Diagnosis Date Noted   • No Active Ambulatory Problems     Resolved Ambulatory Problems     Diagnosis Date Noted   • No Resolved Ambulatory Problems     Past Medical History:   Diagnosis Date   • AAA (abdominal aortic aneurysm) (CMS/[facility])    • COPD (chronic obstructive pulmonary disease) (CMS/[facility])        PAST SURGICAL HISTORY  Past Surgical History:   Procedure Laterality Date   • CHOLECYSTECTOMY         FAMILY HISTORY  History reviewed. No pertinent family history.    SOCIAL HISTORY  Social History     Social History   • Marital status:      Spouse name: N/A   • Number of children: N/A   • Years of education: N/A     Occupational History   • Not on file.     Social History Main Topics   • Smoking status: Current Every Day Smoker     Packs/day: 2.00     Types: Cigarettes   • Smokeless tobacco: Not on file   • Alcohol use No   • Drug use: No   • Sexual activity: Not on file     Other Topics Concern   • Not on file     Social History Narrative   • No narrative on file       ALLERGIES  Patient has no known allergies.    REVIEW OF SYSTEMS  Review of Systems    Constitutional: Negative for activity change, appetite change and fever.   HENT: Negative for congestion and sore throat.    Eyes: Negative.    Respiratory: Positive for cough and shortness of breath.    Cardiovascular: Negative for chest pain and leg swelling.   Gastrointestinal: Negative for abdominal pain, diarrhea and vomiting.   Endocrine: Negative.    Genitourinary: Negative for decreased urine volume and dysuria.   Musculoskeletal: Negative for neck pain.   Skin: Negative for rash and wound.   Allergic/Immunologic: Negative.    Neurological: Negative for weakness, numbness and headaches.   Hematological: Negative.    Psychiatric/Behavioral: Negative.    All other systems reviewed and are negative.      PHYSICAL EXAM  ED Triage Vitals   Temp Heart Rate Resp BP SpO2   07/05/18 2022 07/05/18 2020 07/05/18 2024 07/05/18 2037 07/05/18 2020   98.9 °F (37.2 °C) 92 18 129/86 (!) 87 %      Temp src Heart Rate Source Patient Position BP Location FiO2 (%)   07/05/18 2022 07/05/18 2020 07/05/18 2037 07/05/18 2037 --   Oral Monitor Lying Right arm        Physical Exam   Constitutional: He is oriented to person, place, and time. No distress.   HENT:   Head: Normocephalic and atraumatic.   Eyes: EOM are normal. Pupils are equal, round, and reactive to light.   Neck: Normal range of motion. Neck supple.   Cardiovascular: Normal rate, regular rhythm and normal heart sounds.    Pulmonary/Chest: Effort normal. No respiratory distress. He has decreased breath sounds.   Abdominal: Soft. There is no tenderness. There is no rebound and no guarding.   Musculoskeletal: Normal range of motion. He exhibits no edema.   Neurological: He is alert and oriented to person, place, and time. He has normal sensation and normal strength.   Skin: Skin is warm and dry.   Psychiatric: Mood and affect normal.   Nursing note and vitals reviewed.      LAB RESULTS  Lab Results (last 24 hours)     Procedure Component Value Units Date/Time    CBC &  Differential [[id number]] Collected:  07/05/18 2042    Specimen:  Blood Updated:  07/05/18 2053    Narrative:       The following orders were created for panel order CBC & Differential.  Procedure                               Abnormality         Status                     ---------                               -----------         ------                     CBC Auto Differential[[id number]]        Abnormal            Final result                 Please view results for these tests on the individual orders.    Comprehensive Metabolic Panel [[id number]]  (Abnormal) Collected:  07/05/18 2042    Specimen:  Blood Updated:  07/05/18 2116     Glucose 85 mg/dL      BUN 17 mg/dL      Creatinine 0.71 (L) mg/dL      Sodium 140 mmol/L      Potassium 4.1 mmol/L      Chloride 98 mmol/L      CO2 31.9 (H) mmol/L      Calcium 9.3 mg/dL      Total Protein 7.5 g/dL      Albumin 4.50 g/dL      ALT (SGPT) 26 U/L      AST (SGOT) 19 U/L      Alkaline Phosphatase 58 U/L      Total Bilirubin 0.2 mg/dL      eGFR Non African Amer 114 mL/min/1.73      Globulin 3.0 gm/dL      A/G Ratio 1.5 g/dL      BUN/Creatinine Ratio 23.9     Anion Gap 10.1 mmol/L     BNP [[id number]]  (Normal) Collected:  07/05/18 2042    Specimen:  Blood Updated:  07/05/18 2114     proBNP 16.0 pg/mL     Narrative:       Among patients with dyspnea, NT-proBNP is highly sensitive for the detection of acute congestive heart failure. In addition NT-proBNP of <300 pg/ml effectively rules out acute congestive heart failure with 99% negative predictive value.    Troponin [[id number]]  (Normal) Collected:  07/05/18 2042    Specimen:  Blood Updated:  07/05/18 2116     Troponin T <0.010 ng/mL     Narrative:       Troponin T Reference Ranges:  Less than 0.03 ng/mL:    Negative for AMI  0.03 to 0.09 ng/mL:      Indeterminant for AMI  Greater than 0.09 ng/mL: Positive for AMI    CBC Auto Differential [[id number]]  (Abnormal) Collected:  07/05/18 2042    Specimen:  Blood Updated:  07/05/18  2053     WBC 9.67 10*3/mm3      RBC 5.38 10*6/mm3      Hemoglobin 17.2 g/dL      Hematocrit 51.1 %      MCV 95.0 fL      MCH 32.0 pg      MCHC 33.7 g/dL      RDW 14.6 (H) %      RDW-SD 50.4 fl      MPV 9.8 fL      Platelets 271 10*3/mm3      Neutrophil % 57.3 %      Lymphocyte % 29.4 %      Monocyte % 10.1 %      Eosinophil % 2.7 %      Basophil % 0.5 %      Immature Grans % 0.3 %      Neutrophils, Absolute 5.54 10*3/mm3      Lymphocytes, Absolute 2.84 10*3/mm3      Monocytes, Absolute 0.98 10*3/mm3      Eosinophils, Absolute 0.26 10*3/mm3      Basophils, Absolute 0.05 10*3/mm3      Immature Grans, Absolute 0.03 10*3/mm3     Lipase [[id number]]  (Normal) Collected:  07/05/18 2042    Specimen:  Blood Updated:  07/05/18 2116     Lipase 30 U/L           I ordered the above labs and reviewed the results    RADIOLOGY  XR Chest 2 View   Final Result   Probable left basilar scarring and/or atelectasis, no active   disease       This report was finalized on 7/5/2018 9:29 PM by Curry Gould M.D.               I ordered the above noted radiological studies. Interpreted by radiologist. Reviewed by me in PACS.       PROCEDURES  Procedures    EKG           EKG time: 21:39  Rhythm/Rate: NSR 92  P waves and AK: nml  QRS, axis: nml   ST and T waves: nml     Interpreted Contemporaneously by me, independently viewed  Unchanged compared to prior 9/24/2001    PROGRESS AND CONSULTS        20:31  CMP, CBC, BNP, troponin, lipase, and EKG ordered.    20:44  BP- 129/86 HR- 92 Temp- 98.9 °F (37.2 °C) (Oral) O2 sat- 95% on 2L  Pt's RA sat on arrival to the ED was 87%. Advised pt of the plan for labs and CXR. Pt understands and agrees with the plan, all questions answered.    20:53  Duo-neb and solu-medrol ordered to treat SOA. CXR ordered.     21:42  BP- 129/86 HR- 84 Temp- 98.9 °F (37.2 °C) (Oral) O2 sat- 92% on 2L   Rechecked the patient who is in NAD and is resting comfortably. Pt denies change after breathing treatments. On re-exam,  breath sounds remain diminished. Advised pt of the plan for admission as he is requiring supplemental O2. Pt understands and agrees with the plan, all questions answered.    21:47  Call placed to pulmonology for admission    21:54  Discussed pt's case with Dr Steinberg (pulmonology), who agrees to admit the pt to a tele bed.     MEDICAL DECISION MAKING  Results were reviewed/discussed with the patient and they were also made aware of online access. Pt also made aware that some labs, such as cultures, will not be resulted during ER visit and follow up with PMD is necessary.     MDM  Number of Diagnoses or Management Options     Amount and/or Complexity of Data Reviewed  Clinical lab tests: ordered and reviewed (Troponin is <0.010, BNP=16.0)  Tests in the radiology section of CPT®: ordered and reviewed (CXR shows NAD)  Tests in the medicine section of CPT®: ordered and reviewed (See EKG procedure note. )  Discuss the patient with other providers: yes (Dr Steinberg (pulmonology))    Patient Progress  Patient progress: stable         DIAGNOSIS  Final diagnoses:   Acute respiratory failure with hypoxia (CMS/HCC)   COPD with acute exacerbation (CMS/HCC)       DISPOSITION  ADMISSION    Discussed treatment plan and reason for admission with pt/family and admitting physician.  Pt/family voiced understanding of the plan for admission for further testing/treatment as needed.     Latest Documented Vital Signs:  As of 10:39 PM  BP- 114/89 HR- 101 Temp- 98.9 °F (37.2 °C) (Oral) O2 sat- 92%    --  Documentation assistance provided by beto Michel for Dr Abad.  Information recorded by the scribcorin was done at my direction and has been verified and validated by me.        Francheska Michel  07/05/18 9850       Yury Abad MD  07/05/18 1426

## 2018-07-06 NOTE — PROGRESS NOTES
Adult Nutrition  Assessment/PES    Patient Name:  Tino Paul  YOB: 1960  MRN: [id number]  Admit Date:  7/5/2018    Assessment Date:  7/6/2018    Comments:  Nutrition screen due to MST score of 4. eating 100% of meals. No significant weight loss.          Reason for Assessment     Row Name 07/06/18 1435          Reason for Assessment    Reason For Assessment identified at risk by screening criteria     Diagnosis pulmonary disease     Identified At Risk by Screening Criteria MST SCORE 2+             Nutrition/Diet History     Row Name 07/06/18 1435          Nutrition/Diet History    Typical Food/Fluid Intake appetite is good             Anthropometrics     Row Name 07/06/18 1435          Admit Weight    Admit Weight 77 kg (169 lb 12.1 oz)        Usual Body Weight (UBW)    Weight Loss Time Frame 8lb in 7 months        Body Mass Index (BMI)    BMI Assessment BMI 18.5-24.9: normal             Labs/Tests/Procedures/Meds     Row Name 07/06/18 1435          Labs/Procedures/Meds    Lab Results Reviewed reviewed, pertinent        Diagnostic Tests/Procedures    Diagnostic Test/Procedure Reviewed reviewed, pertinent        Medications    Pertinent Medications Reviewed reviewed, pertinent     Pertinent Medications Comments steroids             Physical Findings     Row Name 07/06/18 1436          Physical Findings    Overall Physical Appearance on oxygen therapy     Skin --   intact               Nutrition Prescription Ordered     Row Name 07/06/18 1436          Nutrition Prescription PO    Current PO Diet Regular             Evaluation of Received Nutrient/Fluid Intake     Row Name 07/06/18 1436          PO Evaluation    Number of Meals 3     % PO Intake 100%       Problem/Interventions:        Problem 1     Row Name 07/06/18 1436          Nutrition Diagnoses Problem 1    Problem 1 Nutrition Appropriate for Condition at this Time               Intervention Goal     Row Name 07/06/18 1437          Intervention  Goal    General Maintain nutrition;Reduce/improve symptoms;Meet nutritional needs for age/condition;Disease management/therapy     PO Tolerate PO;Maintain intake     Weight Maintain weight             Nutrition Intervention     Row Name 07/06/18 1437          Nutrition Intervention    RD/Tech Action Follow Tx progress;Care plan reviewd               Education/Evaluation     Row Name 07/06/18 1437          Education    Education Will Instruct as appropriate        Monitor/Evaluation    Monitor Per protocol;Skin status;Symptoms;I&O;PO intake;Pertinent labs;Weight     Education Follow-up Reinforce PRN         Electronically signed by:  Vibha Langston RD  07/06/18 2:37 PM

## 2018-07-06 NOTE — PLAN OF CARE
Problem: Breathing Pattern Ineffective (Adult)  Intervention: Optimize Oxygenation/Ventilation/Perfusion   07/06/18 1518   Respiratory Interventions   Breathing Techniques/Airway Clearance deep/controlled cough encouraged     Intervention: Minimize Oxygen Consumption/Demand   07/06/18 1518   Activity   Activity Management ambulated to bathroom   Pain/Comfort/Sleep Interventions   Sleep/Rest Enhancement noise level reduced     Intervention: Monitor/Manage Contributing Psychosocial Factors   07/06/18 1518   Coping/Psychosocial Interventions   Supportive Measures active listening utilized

## 2018-07-06 NOTE — ED NOTES
Pt in ER 12 c/o worsening SOA over the last several months.  Pt states he is having increasing trouble sleeping at night due to SOA.  Pt states he smokes 2ppd.  Pt asks that we not discuss it.  Pt does not appear to wish to change his smoking habit.    Pt also c/o abdominal bloating.  States he had some blood in his stool yesterday x 2.  Last night stated it was a small amount of dark blood.     Maria G Espino RN  07/05/18 2058       Maria G Espino RN  07/05/18 2116

## 2018-07-06 NOTE — PLAN OF CARE
Problem: Patient Care Overview  Goal: Plan of Care Review  Outcome: Ongoing (interventions implemented as appropriate)   07/06/18 0332   Coping/Psychosocial   Plan of Care Reviewed With patient   Plan of Care Review   Progress improving   OTHER   Outcome Summary VSS, am labs, BT's, PO steroids, lungs diminished, breathing improving, 2L O2       Problem: Breathing Pattern Ineffective (Adult)  Goal: Identify Related Risk Factors and Signs and Symptoms  Outcome: Ongoing (interventions implemented as appropriate)

## 2018-07-06 NOTE — PROGRESS NOTES
LOS: 1 day   Patient Care Team:  No Known Provider as PCP - General    Subjective     he notes he's a little better he was able to sleep some last night still cough but not bad.  And maybe not as short of breath    Review of Systems:         Objective     Vital Signs  Vital Sign Min/Max for last 24 hours  Temp  Min: 98.1 °F (36.7 °C)  Max: 98.9 °F (37.2 °C)   BP  Min: 113/86  Max: 129/86   Pulse  Min: 82  Max: 103   Resp  Min: 18  Max: 26   SpO2  Min: 87 %  Max: 96 %   Flow (L/min)  Min: 2  Max: 2   Weight  Min: 77 kg (169 lb 12.1 oz)  Max: 77 kg (169 lb 12.1 oz)        Ventilator/Non-Invasive Ventilation Settings     None                       Body mass index is 24.36 kg/m².  No intake/output data recorded.  I/O this shift:  In: 200 [P.O.:200]  Out: 300 [Urine:300]        Physical Exam:    General Appearance: Well-developed white male he sitting on the stool on 2 L nasal cannula O2 with saturations of 90% he doesn't look in acute distress.  Eyes: Conjunctiva are clear and anicteric pupils are equal reactive  ENT: Voice is very hoarse.  Oral mucous membranes are moist I see no erythema or exudates.  Conjunctiva are clear and anicteric pupils are equal  Neck: Do not palpate any adenopathy thyromegaly or other masses trachea is midline there is no jugular venous distention or hepatojugular reflux  Lungs: Clear but there is not much air movement at all no wheezes no rales no rhonchi no dullness on percussion chest expansion is symmetric if he talks much he does have to use accessory muscles some  Cardiac: Regular rate and rhythm no murmur or gallop  Abdomen: Soft nontender no palpable organomegaly or masses  : Not examined  Musc/Skel: Grossly normal  Skin: No jaundice, no petechiae, no rashes  Neuro: He is alert oriented cooperative following commands moving all extremities grossly intact  Extremities/P Vascular: No clubbing no cyanosis no edema palpable radial and dorsalis pedis pulses bilaterally  MSE: In  reasonably good spirits     Labs:    Results from last 7 days  Lab Units 07/05/18 2042   GLUCOSE mg/dL 85   SODIUM mmol/L 140   POTASSIUM mmol/L 4.1   CO2 mmol/L 31.9*   CHLORIDE mmol/L 98   ANION GAP mmol/L 10.1   CREATININE mg/dL 0.71*   BUN mg/dL 17   BUN / CREAT RATIO  23.9   CALCIUM mg/dL 9.3   EGFR IF NONAFRICN AM mL/min/1.73 114   ALK PHOS U/L 58   TOTAL PROTEIN g/dL 7.5   ALT (SGPT) U/L 26   AST (SGOT) U/L 19   BILIRUBIN mg/dL 0.2   ALBUMIN g/dL 4.50   GLOBULIN gm/dL 3.0   A/G RATIO g/dL 1.5     Estimated Creatinine Clearance: 122 mL/min (A) (by C-G formula based on SCr of 0.71 mg/dL (L)).        Results from last 7 days  Lab Units 07/05/18 2042   WBC 10*3/mm3 9.67   RBC 10*6/mm3 5.38   HEMOGLOBIN g/dL 17.2   HEMATOCRIT % 51.1   MCV fL 95.0   MCH pg 32.0   MCHC g/dL 33.7   RDW % 14.6*   RDW-SD fl 50.4   MPV fL 9.8   PLATELETS 10*3/mm3 271   NEUTROPHIL % % 57.3   LYMPHOCYTE % % 29.4   MONOCYTES % % 10.1   EOSINOPHIL % % 2.7   BASOPHIL % % 0.5   IMM GRAN % % 0.3   NEUTROS ABS 10*3/mm3 5.54   LYMPHS ABS 10*3/mm3 2.84   MONOS ABS 10*3/mm3 0.98   EOS ABS 10*3/mm3 0.26   BASOS ABS 10*3/mm3 0.05   IMMATURE GRANS (ABS) 10*3/mm3 0.03           Results from last 7 days  Lab Units 07/05/18 2042   TROPONIN T ng/mL <0.010       Results from last 7 days  Lab Units 07/05/18 2042   PROBNP pg/mL 16.0                 Microbiology Results (last 10 days)     ** No results found for the last 240 hours. **                azithromycin 250 mg Oral Q24H   enoxaparin 40 mg Subcutaneous Q24H   ipratropium-albuterol 3 mL Nebulization 4x Daily - RT   predniSONE 40 mg Oral Daily With Breakfast          Diagnostics:  Xr Chest 2 View    Result Date: 7/5/2018  PA AND LATERAL CHEST X-RAY  HISTORY: soa  COMPARISON: None.  FINDINGS: PA and lateral views of the chest were obtained. Lungs are fairly well inflated. There are some linear densities in the left lung base which are more suggestive of scarring and/or atelectasis. Some mild  fibrotic changes are noted in the lung apices with pleural thickening bilaterally, most likely postinfectious in nature. There is no convincing evidence of active air space disease process otherwise. No edema or significant pleural fluid. Normal heart size. There are calcified residua of granulomatous disease present.        Probable left basilar scarring and/or atelectasis, no active disease  This report was finalized on 7/5/2018 9:29 PM by Curry Gould M.D.               Active Hospital Problems    Diagnosis Date Noted   • Acute respiratory failure with hypoxia (CMS/[facility]) [J96.01] 07/05/2018      Resolved Hospital Problems    Diagnosis Date Noted Date Resolved   No resolved problems to display.         Assessment/Plan     1. Acute exacerbation COPD plan steroids bronchodilators and empiric antibiotic use azithromycin for  anti-inflammatory properties as well.  2. Acute hypoxic respiratory failure secondary #1 supplemental O2  3. Tobacco abuse he has his own worst enemy very heavy tobacco abuse he absolutely needs to stop will offer cessation but he declines nicotine patch.  4. Hoarseness of voice.  This has persisted  could be due to his inhalers as he attributes but it's pretty significant and I have  ordered ENT evaluation to rule out other causes including laryngeal tumors.  5. Insomnia not sure this is true insomnia some not sure what the cause is a could be that he is just hypoxic and his breathing is  Bad but  he doesn't really describe classic orthopnea or PND nor does he have snoring or describe much in the way of apneas type symptoms either this could be something such as depression.  I think we treat his breathing and see how he does he may need further evaluation he may even need cardiac evaluation. Or sleep study.  Last night with supplemental O2 he slept better.  It could be that he is not hypoxic at night.  Before discharge she probably should have a nocturnal oximetry study  6. Weight loss this  could be pulmonary cachexia could be others  need to monitor    Plan for disposition:    Antoni Steinberg MD  07/06/18  4:38 AM    Time:

## 2018-07-07 PROCEDURE — 25010000002 ENOXAPARIN PER 10 MG: Performed by: INTERNAL MEDICINE

## 2018-07-07 PROCEDURE — 63710000001 PREDNISONE PER 1 MG: Performed by: INTERNAL MEDICINE

## 2018-07-07 PROCEDURE — 94799 UNLISTED PULMONARY SVC/PX: CPT

## 2018-07-07 PROCEDURE — G0378 HOSPITAL OBSERVATION PER HR: HCPCS

## 2018-07-07 PROCEDURE — 96372 THER/PROPH/DIAG INJ SC/IM: CPT

## 2018-07-07 RX ADMIN — IPRATROPIUM BROMIDE AND ALBUTEROL SULFATE 3 ML: .5; 3 SOLUTION RESPIRATORY (INHALATION) at 10:57

## 2018-07-07 RX ADMIN — IPRATROPIUM BROMIDE AND ALBUTEROL SULFATE 3 ML: .5; 3 SOLUTION RESPIRATORY (INHALATION) at 14:42

## 2018-07-07 RX ADMIN — NICOTINE 1 PATCH: 21 PATCH, EXTENDED RELEASE TRANSDERMAL at 20:02

## 2018-07-07 RX ADMIN — PREDNISONE 40 MG: 20 TABLET ORAL at 09:32

## 2018-07-07 RX ADMIN — IPRATROPIUM BROMIDE AND ALBUTEROL SULFATE 3 ML: .5; 3 SOLUTION RESPIRATORY (INHALATION) at 20:29

## 2018-07-07 RX ADMIN — ACETAMINOPHEN 650 MG: 325 TABLET, FILM COATED ORAL at 00:57

## 2018-07-07 RX ADMIN — AZITHROMYCIN 250 MG: 250 TABLET, FILM COATED ORAL at 09:32

## 2018-07-07 RX ADMIN — IPRATROPIUM BROMIDE AND ALBUTEROL SULFATE 3 ML: .5; 3 SOLUTION RESPIRATORY (INHALATION) at 06:43

## 2018-07-07 RX ADMIN — ACETAMINOPHEN 650 MG: 325 TABLET, FILM COATED ORAL at 09:32

## 2018-07-07 RX ADMIN — ENOXAPARIN SODIUM 40 MG: 40 INJECTION SUBCUTANEOUS at 09:35

## 2018-07-07 NOTE — PLAN OF CARE
Problem: Patient Care Overview  Goal: Plan of Care Review  Outcome: Ongoing (interventions implemented as appropriate)   07/07/18 0501   Coping/Psychosocial   Plan of Care Reviewed With patient   Plan of Care Review   Progress no change   OTHER   Outcome Summary Patient mild c/o headache treated with Tylenol. Ambulating independently. VSS. Discussed smoking cessation with patient. Nicotine patch added and applied. Remains on 2 L O2. Infrequent cough but not productive. Will continue to monitor.     Goal: Individualization and Mutuality  Outcome: Ongoing (interventions implemented as appropriate)    Goal: Discharge Needs Assessment  Outcome: Ongoing (interventions implemented as appropriate)      Problem: Breathing Pattern Ineffective (Adult)  Goal: Identify Related Risk Factors and Signs and Symptoms  Outcome: Outcome(s) achieved Date Met: 07/07/18    Goal: Effective Oxygenation/Ventilation  Outcome: Ongoing (interventions implemented as appropriate)    Goal: Anxiety/Fear Reduction  Outcome: Ongoing (interventions implemented as appropriate)      Problem: Chronic Obstructive Pulmonary Disease (Adult)  Goal: Signs and Symptoms of Listed Potential Problems Will be Absent, Minimized or Managed (Chronic Obstructive Pulmonary Disease)  Outcome: Ongoing (interventions implemented as appropriate)

## 2018-07-07 NOTE — PLAN OF CARE
Problem: Patient Care Overview  Goal: Plan of Care Review  Outcome: Ongoing (interventions implemented as appropriate)   07/07/18 9898   Coping/Psychosocial   Plan of Care Reviewed With patient   Plan of Care Review   Progress improving   OTHER   Outcome Summary c/o headache off and on today (treated w/ tylenol), VVS, on 2L NC, continuing to monitor       Problem: Breathing Pattern Ineffective (Adult)  Goal: Effective Oxygenation/Ventilation  Outcome: Ongoing (interventions implemented as appropriate)    Goal: Anxiety/Fear Reduction  Outcome: Ongoing (interventions implemented as appropriate)

## 2018-07-07 NOTE — PROGRESS NOTES
[facility] INPATIENT PROGRESS NOTE         [address]    2018      PATIENT IDENTIFICATION:  Name: Tino Paul ADMIT: 2018   : 1960  PCP: No Known Provider    MRN: [id number] LOS: 2 days   AGE/SEX: 58 y.o. male  ROOM: [facility]/                     LOS 2    Reason for visit: Follow-up acute exacerbation of COPD with acute hypoxemic respiratory failure      SUBJECTIVE:    Says that he slept a bit better yesterday evening. Still with moderate cough and severe shortness of breath especially with exertion. On 2 1/2 L supplemental oxygen at the time of my visit with saturations 90%. Desaturates into the upper 80s with activity and talking. Course wheeze on auscultation bilaterally.    Objective   OBJECTIVE:    Vital Sign Min/Max for last 24 hours  Temp  Min: 97.3 °F (36.3 °C)  Max: 98 °F (36.7 °C)   BP  Min: 115/78  Max: 124/91   Pulse  Min: 88  Max: 106   Resp  Min: 15  Max: 26   SpO2  Min: 90 %  Max: 94 %   No Data Recorded   No Data Recorded                         Body mass index is 24.36 kg/m².    Intake/Output Summary (Last 24 hours) at 18 1059  Last data filed at 18 0616   Gross per 24 hour   Intake              960 ml   Output                0 ml   Net              960 ml         Exam:  GEN:  No distress, appears stated age  EYES:   PERRL, anicteric sclera  ENT:    External ears/nose normal, OP clear  NECK:  No adenopathy, midline trachea  LUNGS: Normal chest on inspection, palpation and Course bilateral wheeze on auscultation  CV:  Normal S1S2, without murmur  ABD:  Non tender, non distended, no hepatosplenomegaly, +BS  EXT:  No edema, cyanosis or clubbing    Scheduled meds:    azithromycin 250 mg Oral Q24H   enoxaparin 40 mg Subcutaneous Q24H   ipratropium-albuterol 3 mL Nebulization 4x Daily - RT   nicotine 1 patch Transdermal Q24H   predniSONE 40 mg Oral Daily With Breakfast     IV meds:                         Data Review:    Results from last 7 days  Lab Units  07/05/18  2042   SODIUM mmol/L 140   POTASSIUM mmol/L 4.1   CHLORIDE mmol/L 98   CO2 mmol/L 31.9*   BUN mg/dL 17   CREATININE mg/dL 0.71*   GLUCOSE mg/dL 85   CALCIUM mg/dL 9.3         Estimated Creatinine Clearance: 122 mL/min (A) (by C-G formula based on SCr of 0.71 mg/dL (L)).    Results from last 7 days  Lab Units 07/05/18  2042   WBC 10*3/mm3 9.67   HEMOGLOBIN g/dL 17.2   PLATELETS 10*3/mm3 271           Results from last 7 days  Lab Units 07/05/18  2042   ALT (SGPT) U/L 26   AST (SGOT) U/L 19                 Chest x-ray viewed 7/5/18 shows left basilar scarring and atelectasis        )Assessment   ASSESSMENT:      Active Hospital Problems    Diagnosis Date Noted   • Acute respiratory failure with hypoxia (CMS/[facility]) [J96.01] 07/05/2018      Resolved Hospital Problems    Diagnosis Date Noted Date Resolved   No resolved problems to display.     Acute exacerbation of COPD  Acute hypoxemic respiratory failure  Persistent tobacco abuse  Insomnia  Weight loss  Hoarseness of voice      PLAN:  Continue bronchodilators nebulized and systemic steroid with taper.  Wean oxygen as able.  Still very short of breath with minimal activity.  Not quite ready for home yet.  Likely one or 2 more days.  Encourage smoking cessation.    Ollie Cesar MD  Pulmonary and Critical Care Medicine  [facility], [facility]  7/7/2018    10:59 AM

## 2018-07-07 NOTE — PLAN OF CARE
Problem: Breathing Pattern Ineffective (Adult)  Intervention: Optimize Oxygenation/Ventilation/Perfusion   07/07/18 1426   Respiratory Interventions   Airway/Ventilation Management pulmonary hygiene promoted   Positioning   Head of Bed (HOB) HOB at 30-45 degrees     Intervention: Monitor/Manage Contributing Psychosocial Factors   07/07/18 1426   Coping/Psychosocial Interventions   Supportive Measures self-care encouraged;other (see comments)  (spoke with patient about the importance of quitting tobacco)

## 2018-07-08 PROCEDURE — 94799 UNLISTED PULMONARY SVC/PX: CPT

## 2018-07-08 PROCEDURE — G0378 HOSPITAL OBSERVATION PER HR: HCPCS

## 2018-07-08 PROCEDURE — 63710000001 PREDNISONE PER 1 MG: Performed by: INTERNAL MEDICINE

## 2018-07-08 PROCEDURE — 94640 AIRWAY INHALATION TREATMENT: CPT

## 2018-07-08 PROCEDURE — 25010000002 ENOXAPARIN PER 10 MG: Performed by: INTERNAL MEDICINE

## 2018-07-08 PROCEDURE — 96372 THER/PROPH/DIAG INJ SC/IM: CPT

## 2018-07-08 RX ORDER — BUDESONIDE AND FORMOTEROL FUMARATE DIHYDRATE 160; 4.5 UG/1; UG/1
2 AEROSOL RESPIRATORY (INHALATION)
Status: DISCONTINUED | OUTPATIENT
Start: 2018-07-08 | End: 2018-07-09 | Stop reason: HOSPADM

## 2018-07-08 RX ORDER — CALCIUM CARBONATE 200(500)MG
2 TABLET,CHEWABLE ORAL 3 TIMES DAILY PRN
Status: DISCONTINUED | OUTPATIENT
Start: 2018-07-08 | End: 2018-07-09 | Stop reason: HOSPADM

## 2018-07-08 RX ADMIN — ENOXAPARIN SODIUM 40 MG: 40 INJECTION SUBCUTANEOUS at 08:39

## 2018-07-08 RX ADMIN — NICOTINE 1 PATCH: 21 PATCH, EXTENDED RELEASE TRANSDERMAL at 21:00

## 2018-07-08 RX ADMIN — IPRATROPIUM BROMIDE AND ALBUTEROL SULFATE 3 ML: .5; 3 SOLUTION RESPIRATORY (INHALATION) at 07:31

## 2018-07-08 RX ADMIN — AZITHROMYCIN 250 MG: 250 TABLET, FILM COATED ORAL at 08:44

## 2018-07-08 RX ADMIN — BUDESONIDE AND FORMOTEROL FUMARATE DIHYDRATE 2 PUFF: 160; 4.5 AEROSOL RESPIRATORY (INHALATION) at 19:38

## 2018-07-08 RX ADMIN — IPRATROPIUM BROMIDE AND ALBUTEROL SULFATE 3 ML: .5; 3 SOLUTION RESPIRATORY (INHALATION) at 10:40

## 2018-07-08 RX ADMIN — ACETAMINOPHEN 650 MG: 325 TABLET, FILM COATED ORAL at 21:02

## 2018-07-08 RX ADMIN — IPRATROPIUM BROMIDE AND ALBUTEROL SULFATE 3 ML: .5; 3 SOLUTION RESPIRATORY (INHALATION) at 14:39

## 2018-07-08 RX ADMIN — Medication 2 TABLET: at 20:59

## 2018-07-08 RX ADMIN — ACETAMINOPHEN 650 MG: 325 TABLET, FILM COATED ORAL at 11:32

## 2018-07-08 RX ADMIN — PREDNISONE 40 MG: 20 TABLET ORAL at 08:39

## 2018-07-08 RX ADMIN — ACETAMINOPHEN 650 MG: 325 TABLET, FILM COATED ORAL at 04:48

## 2018-07-08 RX ADMIN — ACETAMINOPHEN 650 MG: 325 TABLET, FILM COATED ORAL at 00:14

## 2018-07-08 RX ADMIN — IPRATROPIUM BROMIDE AND ALBUTEROL SULFATE 3 ML: .5; 3 SOLUTION RESPIRATORY (INHALATION) at 19:24

## 2018-07-08 NOTE — PLAN OF CARE
Problem: Patient Care Overview  Goal: Plan of Care Review  Outcome: Ongoing (interventions implemented as appropriate)   07/08/18 0417   Coping/Psychosocial   Plan of Care Reviewed With patient   Plan of Care Review   Progress no change   OTHER   Outcome Summary Patient c/o headache treated with Tylenol. Ambulating independently. HR has been more tachy tonight but otherwise VSS. Remains on 2 L O2, if off SAT's drop to 80's. Encouraging coughing and deep breathing. Will continue to monitor.     Goal: Individualization and Mutuality  Outcome: Ongoing (interventions implemented as appropriate)    Goal: Discharge Needs Assessment  Outcome: Ongoing (interventions implemented as appropriate)      Problem: Breathing Pattern Ineffective (Adult)  Goal: Effective Oxygenation/Ventilation  Outcome: Ongoing (interventions implemented as appropriate)    Goal: Anxiety/Fear Reduction  Outcome: Ongoing (interventions implemented as appropriate)      Problem: Chronic Obstructive Pulmonary Disease (Adult)  Goal: Signs and Symptoms of Listed Potential Problems Will be Absent, Minimized or Managed (Chronic Obstructive Pulmonary Disease)  Outcome: Ongoing (interventions implemented as appropriate)

## 2018-07-08 NOTE — PLAN OF CARE
Problem: Patient Care Overview  Goal: Plan of Care Review  Outcome: Ongoing (interventions implemented as appropriate)   07/08/18 9057   Coping/Psychosocial   Plan of Care Reviewed With patient   Plan of Care Review   Progress improving   OTHER   Outcome Summary tachycardic, on 2L NC, SOA w/ exertion, continue to monitor       Problem: Breathing Pattern Ineffective (Adult)  Goal: Anxiety/Fear Reduction  Outcome: Ongoing (interventions implemented as appropriate)      Problem: Chronic Obstructive Pulmonary Disease (Adult)  Goal: Signs and Symptoms of Listed Potential Problems Will be Absent, Minimized or Managed (Chronic Obstructive Pulmonary Disease)  Outcome: Ongoing (interventions implemented as appropriate)

## 2018-07-08 NOTE — PROGRESS NOTES
[facility] INPATIENT PROGRESS NOTE         [address]    2018      PATIENT IDENTIFICATION:  Name: Tino Paul ADMIT: 2018   : 1960  PCP: No Known Provider    MRN: [id number] LOS: 3 days   AGE/SEX: 58 y.o. male  ROOM: [facility]                     LOS 3    Reason for visit: Follow-up acute exacerbation of COPD with acute hypoxemic respiratory failure      SUBJECTIVE:    Still significantly short of breath with activity. Requiring supplemental oxygen. On 2 L per minute at the time of my visit in saturation is 88% with talking. Moderate wheezing on auscultation. We is worse than yesterday. Not ready for discharge home yet. Needs to get a nebulizer set up for Home and evaluate for oxygen use at home. If show signs of improvement may be ready by tomorrow.    Objective   OBJECTIVE:    Vital Sign Min/Max for last 24 hours  Temp  Min: 97.5 °F (36.4 °C)  Max: 98.3 °F (36.8 °C)   BP  Min: 106/70  Max: 128/86   Pulse  Min: 98  Max: 110   Resp  Min: 16  Max: 20   SpO2  Min: 91 %  Max: 96 %   No Data Recorded   No Data Recorded                         Body mass index is 24.36 kg/m².    Intake/Output Summary (Last 24 hours) at 18 1046  Last data filed at 18 0759   Gross per 24 hour   Intake             1400 ml   Output                0 ml   Net             1400 ml         Exam:  GEN:  No distress, appears stated age  EYES:   PERRL, anicteric sclera  ENT:    External ears/nose normal, OP clear  NECK:  No adenopathy, midline trachea  LUNGS: Normal chest on inspection, palpation and Course bilateral wheeze on auscultation  CV:  Normal S1S2, without murmur  ABD:  Non tender, non distended, no hepatosplenomegaly, +BS  EXT:  No edema, cyanosis or clubbing    Scheduled meds:      azithromycin 250 mg Oral Q24H   enoxaparin 40 mg Subcutaneous Q24H   ipratropium-albuterol 3 mL Nebulization 4x Daily - RT   nicotine 1 patch Transdermal Q24H   predniSONE 40 mg Oral Daily With Breakfast     IV meds:                          Data Review:    Results from last 7 days  Lab Units 07/05/18 2042   SODIUM mmol/L 140   POTASSIUM mmol/L 4.1   CHLORIDE mmol/L 98   CO2 mmol/L 31.9*   BUN mg/dL 17   CREATININE mg/dL 0.71*   GLUCOSE mg/dL 85   CALCIUM mg/dL 9.3         Estimated Creatinine Clearance: 122 mL/min (A) (by C-G formula based on SCr of 0.71 mg/dL (L)).    Results from last 7 days  Lab Units 07/05/18  2042   WBC 10*3/mm3 9.67   HEMOGLOBIN g/dL 17.2   PLATELETS 10*3/mm3 271           Results from last 7 days  Lab Units 07/05/18  2042   ALT (SGPT) U/L 26   AST (SGOT) U/L 19                 Chest x-ray viewed 7/5/18 shows left basilar scarring and atelectasis        )Assessment   ASSESSMENT:      Active Hospital Problems    Diagnosis Date Noted   • Acute respiratory failure with hypoxia (CMS/HCC) [J96.01] 07/05/2018      Resolved Hospital Problems    Diagnosis Date Noted Date Resolved   No resolved problems to display.     Acute exacerbation of COPD  Acute hypoxemic respiratory failure  Persistent tobacco abuse  Insomnia  Weight loss  Hoarseness of voice      PLAN:  Continue bronchodilators nebulized and systemic steroid with taper.  Will set up with nebulizer at home.  Wean oxygen as able.  May need at d/c.  Still very short of breath with minimal activity.  Not quite ready for home yet.  Likely tomorrow or next day.  Encourage smoking cessation.    Ollie Cesar MD  Pulmonary and Critical Care Medicine  [facility], [facility]  7/8/2018    10:46 AM

## 2018-07-09 VITALS
SYSTOLIC BLOOD PRESSURE: 131 MMHG | TEMPERATURE: 97.8 F | DIASTOLIC BLOOD PRESSURE: 90 MMHG | WEIGHT: 169.75 LBS | OXYGEN SATURATION: 94 % | HEART RATE: 106 BPM | RESPIRATION RATE: 22 BRPM | HEIGHT: 70 IN | BODY MASS INDEX: 24.3 KG/M2

## 2018-07-09 PROCEDURE — 94799 UNLISTED PULMONARY SVC/PX: CPT

## 2018-07-09 PROCEDURE — 25010000002 ENOXAPARIN PER 10 MG: Performed by: INTERNAL MEDICINE

## 2018-07-09 PROCEDURE — G0378 HOSPITAL OBSERVATION PER HR: HCPCS

## 2018-07-09 PROCEDURE — 63710000001 PREDNISONE PER 1 MG: Performed by: INTERNAL MEDICINE

## 2018-07-09 RX ORDER — IPRATROPIUM BROMIDE AND ALBUTEROL SULFATE 2.5; .5 MG/3ML; MG/3ML
3 SOLUTION RESPIRATORY (INHALATION)
Qty: 360 ML | Refills: 1 | Status: ON HOLD | OUTPATIENT
Start: 2018-07-09 | End: 2018-09-26

## 2018-07-09 RX ORDER — PREDNISONE 10 MG/1
TABLET ORAL
Qty: 31 TABLET | Refills: 0 | Status: SHIPPED | OUTPATIENT
Start: 2018-07-09 | End: 2018-09-26 | Stop reason: HOSPADM

## 2018-07-09 RX ORDER — NICOTINE 21 MG/24HR
1 PATCH, TRANSDERMAL 24 HOURS TRANSDERMAL EVERY 24 HOURS
Qty: 10 PATCH | Refills: 0 | Status: SHIPPED | OUTPATIENT
Start: 2018-07-09 | End: 2018-09-26 | Stop reason: HOSPADM

## 2018-07-09 RX ADMIN — IPRATROPIUM BROMIDE AND ALBUTEROL SULFATE 3 ML: .5; 3 SOLUTION RESPIRATORY (INHALATION) at 10:42

## 2018-07-09 RX ADMIN — BUDESONIDE AND FORMOTEROL FUMARATE DIHYDRATE 2 PUFF: 160; 4.5 AEROSOL RESPIRATORY (INHALATION) at 07:22

## 2018-07-09 RX ADMIN — IPRATROPIUM BROMIDE AND ALBUTEROL SULFATE 3 ML: .5; 3 SOLUTION RESPIRATORY (INHALATION) at 14:45

## 2018-07-09 RX ADMIN — PREDNISONE 40 MG: 20 TABLET ORAL at 12:19

## 2018-07-09 RX ADMIN — IPRATROPIUM BROMIDE AND ALBUTEROL SULFATE 3 ML: .5; 3 SOLUTION RESPIRATORY (INHALATION) at 07:22

## 2018-07-09 RX ADMIN — AZITHROMYCIN 250 MG: 250 TABLET, FILM COATED ORAL at 12:19

## 2018-07-09 RX ADMIN — ACETAMINOPHEN 650 MG: 325 TABLET, FILM COATED ORAL at 05:04

## 2018-07-09 NOTE — PROGRESS NOTES
PHYSICIAN DISCHARGE SUMMARY                                                                        [facility]    Date of admit: 7/5/2018  Date of Discharge:  7/9/2018    PCP: No Known Provider    Discharge Diagnosis:   Active Problems:    Acute respiratory failure with hypoxia (CMS/[facility])     Acute exacerbation of COPD  Acute hypoxemic respiratory failure  Persistent tobacco abuse  Insomnia  Weight loss  Hoarseness of voice    Secondary Diagnoses:  Past Medical History:   Diagnosis Date   • AAA (abdominal aortic aneurysm) (CMS/[facility])    • COPD (chronic obstructive pulmonary disease) (CMS/[facility])        Procedures Performed           Consults:       Hospital Course  Patient is a 58 y.o. male presented with per Dr Steinberg's H&P:  Patient is a 58 y.o. male.  Asked the emergency room to admit for COPD exacerbation they didn't feel he was safe for discharge home.  He presented with mild hypoxic respiratory failure.  He has a active 2 pack a day smoker.  He follows number office with Dr. Grey patient reports she's been short of breath for a year and half.  As he is not been sleeping well for over 6 months he can't lay flat on his back he can sleep on his left side and sometimes his right side and he just wakes up at night not particularly short of breath but he wakes up and can't go back to sleep.  He has some cough not producing any sputum he's had increasing shortness of breath.  His voice is been hoarse ever since he started inhalers over year ago.  Apparently when he started inhalers he was using double dose.  Then a thought maybe that was the hoarseness of voice but it has not improved since he has discontinued that he uses Bivespi 2 puffs twice a day as his only inhaler now.  He has never required supplemental oxygen he says he can walk pretty good without having to stop and catch his breath.  His oxygen saturations were 87% at rest in the  emergency room today.  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  He also well but for some time.  He had a number of other shu jobs.  He is not aware of any family history of lung disease.  He's had a little bit of chest tightness in his upper chest for the last few days it does come and go some there is no pain shoulder jaw arm or neck.  He is net no lower extremity pain or swelling.  Patient has lost about 8 pounds since January he says if he eats too much he just bloats up and he can't breathe.  Patient reports he has tried multiple medications to stop smoking and none of them help at all he has all of them at home.    Patient was admitted with acute exacerbation of COPD with persistent tobacco abuse and acute hypoxemic respiratory failure.  Treated with scheduled as needed bronchodilators and steroids.  Has clinically improved.  Will require supplemental oxygen at home after discharge.  Setting up with nebulizer as well.  He will follow-up with us in the office next week.  I encouraged him to quit smoking.  Nicotine patch is given today.  On my way out of his room this morning he was asking for Viagra pills.  I recommend he hold off on that for now.    Condition on Discharge:  Stable.      Vital Signs  Temp:  [97.2 °F (36.2 °C)-98.2 °F (36.8 °C)] 97.8 °F (36.6 °C)  Heart Rate:  [] 101  Resp:  [18-20] 18  BP: (122-139)/(75-92) 138/86    Physical Exam:  General Appearance: no acute distress, appears comfortable  Heart: regular rate and rhythm  Lungs: clear to auscultation bilaterally, respirations unlabored  Abdomen: soft, non-tender, non-distended, no guarding/rebound, nl BS  Extremeties: no edema, no cyanosis  Neurology: speech normal, mental status normal, moving all four extremeties  Mental Status: alert, oriented        --  Consults:   IP CONSULT TO PULMONOLOGY  IP  CONSULT TO ENT    Significant Discharge Diagnostics   Procedures Performed:         Pertinent Lab Results:    Results from last 7 days  Lab Units 07/05/18 2042   SODIUM mmol/L 140   POTASSIUM mmol/L 4.1   CHLORIDE mmol/L 98   CO2 mmol/L 31.9*   BUN mg/dL 17   CREATININE mg/dL 0.71*   GLUCOSE mg/dL 85   CALCIUM mg/dL 9.3   AST (SGOT) U/L 19   ALT (SGPT) U/L 26       Results from last 7 days  Lab Units 07/05/18 2042   TROPONIN T ng/mL <0.010       Results from last 7 days  Lab Units 07/05/18 2042   WBC 10*3/mm3 9.67   HEMOGLOBIN g/dL 17.2   HEMATOCRIT % 51.1   PLATELETS 10*3/mm3 271   MCV fL 95.0   MCH pg 32.0   MCHC g/dL 33.7   RDW % 14.6*   RDW-SD fl 50.4   MPV fL 9.8   NEUTROPHIL % % 57.3   LYMPHOCYTE % % 29.4   MONOCYTES % % 10.1   EOSINOPHIL % % 2.7   BASOPHIL % % 0.5   IMM GRAN % % 0.3   NEUTROS ABS 10*3/mm3 5.54   LYMPHS ABS 10*3/mm3 2.84   MONOS ABS 10*3/mm3 0.98   EOS ABS 10*3/mm3 0.26   BASOS ABS 10*3/mm3 0.05   IMMATURE GRANS (ABS) 10*3/mm3 0.03                   Invalid input(s): LDLCALC    Results from last 7 days  Lab Units 07/05/18 2042   PROBNP pg/mL 16.0                       Results from last 7 days  Lab Units 07/05/18 2042   LIPASE U/L 30                       Imaging Results:  Imaging Results (all)     Procedure Component Value Units Date/Time    XR Chest 2 View [[id number]] Collected:  07/05/18 2128     Updated:  07/05/18 2132    Narrative:       PA AND LATERAL CHEST X-RAY     HISTORY: soa     COMPARISON: None.     FINDINGS: PA and lateral views of the chest were obtained. Lungs are  fairly well inflated. There are some linear densities in the left lung  base which are more suggestive of scarring and/or atelectasis. Some mild  fibrotic changes are noted in the lung apices with pleural thickening  bilaterally, most likely postinfectious in nature. There is no  convincing evidence of active air space disease process otherwise. No  edema or significant pleural fluid. Normal heart size. There  are  calcified residua of granulomatous disease present.             Impression:       Probable left basilar scarring and/or atelectasis, no active  disease     This report was finalized on 7/5/2018 9:29 PM by Curry Gould M.D.           --    Discharge Disposition  Home or Self Care    Discharge Medications     Discharge Medications      New Medications      Instructions Start Date   nicotine 21 MG/24HR patch  Commonly known as:  NICODERM CQ   1 patch, Transdermal, Every 24 Hours      predniSONE 10 MG tablet  Commonly known as:  DELTASONE   Take 4 tabs daily x 3 days, then take 3 tabs daily x 3 days, then take 2 tabs daily x 3 days, then take 1 tab daily x 3 days         Continue These Medications      Instructions Start Date   BEVESPI AEROSPHERE 9-4.8 MCG/ACT aerosol  Generic drug:  Glycopyrrolate-Formoterol   2 sprays, Inhalation, 2 Times Daily      MUCUS RELIEF 400 MG tablet  Generic drug:  guaiFENesin   400 mg, Oral, 2 Times Daily             Discharge Diet: regular diet    Activity at Discharge:     Follow-up Information     No Known Provider Follow up.    Contact information:  [facility] [address] [phone]             Garrick Ritter MD Follow up in 1 week(s).    Specialty:  Pulmonary Disease  Contact information:  [facility] [address] [id number] [phone]             Garrick Ritter MD .    Specialty:  Pulmonary Disease  Contact information:  [address] [facility] [address]  [facility] [address] [phone]                 See primary care provider, No Known Provider, in 1-2 weeks    [unfilled]    Test Results Pending at Discharge  [unfilled]     Ollie Cesar MD  [facility], [facility]  Pulmonary and Critical Care Medicine  07/09/18  11:50 AM    Time: greater than 30 minutes.        Total time spent discharging patient including evaluation,post hospitalization follow up,  medication and post hospitalization instructions and  education total time exceeds 30 minutes.

## 2018-07-09 NOTE — PROGRESS NOTES
Exercise Oximetry    Patient Name:Tino Paul   MRN: [id number]   Date: 07/09/18             ROOM AIR BASELINE   SpO    92   Heart Rate 100   Blood Pressure      EXERCISE ON ROOM AIR SpO2% EXERCISE ON O2 @  LPM SpO2%   1 MINUTE           92  1 MINUTE    2 MINUTES         89  2 MINUTES    3 MI                      88  3 MINUTES                 2  89   4 MINUTES  4 MINUTES                                   90   5 MINUTES  5 MINUTES  90   6 MINUTES  6 MINUTES 90              Distance Walked   Distance Walked   Dyspnea (Rufus Scale)   Dyspnea (Rufus Scale)   Fatigue (Rufus Scale)  Fatigue (Rufus Scale)   SpO2% Post Exercise   SpO2% Post Exercise 92   HR Post Exercise   HR Post Exercise       100   Time to Recovery   Time to Recovery     3min     Comments: walked with no noted distress or complication.

## 2018-07-09 NOTE — PLAN OF CARE
Problem: Patient Care Overview  Goal: Plan of Care Review   07/09/18 8120   Coping/Psychosocial   Plan of Care Reviewed With patient   Plan of Care Review   Progress improving   OTHER   Outcome Summary VSS, patient needs to be DC on home o2 based on walking oximetry. portable o2 tank was delivered to the room and home o2 and nebulizer supplies to be delivered to his home. smoke cessation teaching done

## 2018-07-09 NOTE — PLAN OF CARE
Problem: Patient Care Overview  Goal: Plan of Care Review  Outcome: Ongoing (interventions implemented as appropriate)   07/09/18 0610   Coping/Psychosocial   Plan of Care Reviewed With patient   Plan of Care Review   Progress no change   OTHER   Outcome Summary VSS, am labs, lungs diminished, walking ox study today, SOA w/ exertion       Problem: Breathing Pattern Ineffective (Adult)  Goal: Identify Related Risk Factors and Signs and Symptoms  Outcome: Ongoing (interventions implemented as appropriate)      Problem: Chronic Obstructive Pulmonary Disease (Adult)  Goal: Signs and Symptoms of Listed Potential Problems Will be Absent, Minimized or Managed (Chronic Obstructive Pulmonary Disease)  Outcome: Ongoing (interventions implemented as appropriate)

## 2018-07-09 NOTE — PROGRESS NOTES
Case Management Discharge Note    Final Note: Patient will be DC'd home with [facility] providing O2 and nebulizer.             Other: Other (Private car)    Final Discharge Disposition Code: 01 - home or self-care

## 2018-09-14 ENCOUNTER — HOSPITAL ENCOUNTER (INPATIENT)
Facility: HOSPITAL | Age: 58
LOS: 12 days | Discharge: NURSING, REHAB OR LONG-TERM CARE | End: 2018-09-26
Attending: INTERNAL MEDICINE | Admitting: INTERNAL MEDICINE

## 2018-09-14 ENCOUNTER — APPOINTMENT (OUTPATIENT)
Dept: GENERAL RADIOLOGY | Facility: HOSPITAL | Age: 58
End: 2018-09-14
Attending: INTERNAL MEDICINE

## 2018-09-14 DIAGNOSIS — R53.1 GENERAL WEAKNESS: Primary | ICD-10-CM

## 2018-09-14 PROBLEM — J96.91 RESPIRATORY FAILURE WITH HYPOXIA (HCC): Status: ACTIVE | Noted: 2018-09-14

## 2018-09-14 LAB
AMPHET+METHAMPHET UR QL: NEGATIVE
ANION GAP SERPL CALCULATED.3IONS-SCNC: 8.4 MMOL/L
BARBITURATES UR QL SCN: NEGATIVE
BASOPHILS # BLD AUTO: 0.02 10*3/MM3 (ref 0–0.2)
BASOPHILS NFR BLD AUTO: 0.2 % (ref 0–1.5)
BENZODIAZ UR QL SCN: NEGATIVE
BUN BLD-MCNC: 15 MG/DL (ref 6–20)
BUN/CREAT SERPL: 25.9 (ref 7–25)
CA-I BLD-MCNC: 5.1 MG/DL (ref 4.6–5.4)
CA-I SERPL ISE-MCNC: 1.28 MMOL/L (ref 1.15–1.35)
CALCIUM SPEC-SCNC: 9.6 MG/DL (ref 8.6–10.5)
CANNABINOIDS SERPL QL: NEGATIVE
CHLORIDE SERPL-SCNC: 92 MMOL/L (ref 98–107)
CO2 SERPL-SCNC: 43.6 MMOL/L (ref 22–29)
COCAINE UR QL: NEGATIVE
CREAT BLD-MCNC: 0.58 MG/DL (ref 0.76–1.27)
DEPRECATED RDW RBC AUTO: 53 FL (ref 37–54)
EOSINOPHIL # BLD AUTO: 0.17 10*3/MM3 (ref 0–0.7)
EOSINOPHIL NFR BLD AUTO: 2.1 % (ref 0.3–6.2)
ERYTHROCYTE [DISTWIDTH] IN BLOOD BY AUTOMATED COUNT: 14.5 % (ref 11.5–14.5)
GFR SERPL CREATININE-BSD FRML MDRD: 143 ML/MIN/1.73
GLUCOSE BLD-MCNC: 137 MG/DL (ref 65–99)
HAV IGM SERPL QL IA: NORMAL
HBV CORE IGM SERPL QL IA: NORMAL
HBV SURFACE AG SERPL QL IA: NORMAL
HCT VFR BLD AUTO: 48.8 % (ref 40.4–52.2)
HCV AB SER DONR QL: NORMAL
HGB BLD-MCNC: 15.1 G/DL (ref 13.7–17.6)
IMM GRANULOCYTES # BLD: 0.02 10*3/MM3 (ref 0–0.03)
IMM GRANULOCYTES NFR BLD: 0.2 % (ref 0–0.5)
LYMPHOCYTES # BLD AUTO: 1.7 10*3/MM3 (ref 0.9–4.8)
LYMPHOCYTES NFR BLD AUTO: 20.7 % (ref 19.6–45.3)
MAGNESIUM SERPL-MCNC: 2.2 MG/DL (ref 1.6–2.6)
MCH RBC QN AUTO: 30.9 PG (ref 27–32.7)
MCHC RBC AUTO-ENTMCNC: 30.9 G/DL (ref 32.6–36.4)
MCV RBC AUTO: 99.8 FL (ref 79.8–96.2)
METHADONE UR QL SCN: NEGATIVE
MONOCYTES # BLD AUTO: 0.61 10*3/MM3 (ref 0.2–1.2)
MONOCYTES NFR BLD AUTO: 7.4 % (ref 5–12)
NEUTROPHILS # BLD AUTO: 5.7 10*3/MM3 (ref 1.9–8.1)
NEUTROPHILS NFR BLD AUTO: 69.6 % (ref 42.7–76)
NT-PROBNP SERPL-MCNC: <5 PG/ML (ref 5–900)
OPIATES UR QL: NEGATIVE
OXYCODONE UR QL SCN: NEGATIVE
PHOSPHATE SERPL-MCNC: 3.5 MG/DL (ref 2.5–4.5)
PLATELET # BLD AUTO: 269 10*3/MM3 (ref 140–500)
PMV BLD AUTO: 10.1 FL (ref 6–12)
POTASSIUM BLD-SCNC: 3.6 MMOL/L (ref 3.5–5.2)
PROCALCITONIN SERPL-MCNC: 0.34 NG/ML (ref 0.1–0.25)
RBC # BLD AUTO: 4.89 10*6/MM3 (ref 4.6–6)
SODIUM BLD-SCNC: 144 MMOL/L (ref 136–145)
WBC NRBC COR # BLD: 8.2 10*3/MM3 (ref 4.5–10.7)

## 2018-09-14 PROCEDURE — 25010000002 ENOXAPARIN PER 10 MG: Performed by: INTERNAL MEDICINE

## 2018-09-14 PROCEDURE — 80048 BASIC METABOLIC PNL TOTAL CA: CPT | Performed by: INTERNAL MEDICINE

## 2018-09-14 PROCEDURE — 71046 X-RAY EXAM CHEST 2 VIEWS: CPT

## 2018-09-14 PROCEDURE — 80307 DRUG TEST PRSMV CHEM ANLYZR: CPT | Performed by: INTERNAL MEDICINE

## 2018-09-14 PROCEDURE — 84145 PROCALCITONIN (PCT): CPT | Performed by: INTERNAL MEDICINE

## 2018-09-14 PROCEDURE — 94799 UNLISTED PULMONARY SVC/PX: CPT

## 2018-09-14 PROCEDURE — 82330 ASSAY OF CALCIUM: CPT | Performed by: INTERNAL MEDICINE

## 2018-09-14 PROCEDURE — 84100 ASSAY OF PHOSPHORUS: CPT | Performed by: INTERNAL MEDICINE

## 2018-09-14 PROCEDURE — 83735 ASSAY OF MAGNESIUM: CPT | Performed by: INTERNAL MEDICINE

## 2018-09-14 PROCEDURE — 80074 ACUTE HEPATITIS PANEL: CPT | Performed by: INTERNAL MEDICINE

## 2018-09-14 PROCEDURE — 83880 ASSAY OF NATRIURETIC PEPTIDE: CPT | Performed by: INTERNAL MEDICINE

## 2018-09-14 PROCEDURE — 85025 COMPLETE CBC W/AUTO DIFF WBC: CPT | Performed by: INTERNAL MEDICINE

## 2018-09-14 PROCEDURE — 94640 AIRWAY INHALATION TREATMENT: CPT

## 2018-09-14 PROCEDURE — 25010000002 FUROSEMIDE PER 20 MG: Performed by: INTERNAL MEDICINE

## 2018-09-14 RX ORDER — ALUMINA, MAGNESIA, AND SIMETHICONE 2400; 2400; 240 MG/30ML; MG/30ML; MG/30ML
15 SUSPENSION ORAL EVERY 6 HOURS PRN
Status: DISCONTINUED | OUTPATIENT
Start: 2018-09-14 | End: 2018-09-26 | Stop reason: HOSPADM

## 2018-09-14 RX ORDER — FUROSEMIDE 10 MG/ML
40 INJECTION INTRAMUSCULAR; INTRAVENOUS ONCE
Status: COMPLETED | OUTPATIENT
Start: 2018-09-14 | End: 2018-09-14

## 2018-09-14 RX ORDER — BUDESONIDE 0.25 MG/2ML
0.25 INHALANT ORAL
Status: DISCONTINUED | OUTPATIENT
Start: 2018-09-14 | End: 2018-09-26 | Stop reason: HOSPADM

## 2018-09-14 RX ORDER — SODIUM CHLORIDE 0.9 % (FLUSH) 0.9 %
1-10 SYRINGE (ML) INJECTION AS NEEDED
Status: DISCONTINUED | OUTPATIENT
Start: 2018-09-14 | End: 2018-09-26 | Stop reason: HOSPADM

## 2018-09-14 RX ORDER — FUROSEMIDE 20 MG/1
20 TABLET ORAL DAILY
COMMUNITY
End: 2018-09-26 | Stop reason: HOSPADM

## 2018-09-14 RX ORDER — ARFORMOTEROL TARTRATE 15 UG/2ML
15 SOLUTION RESPIRATORY (INHALATION)
Status: DISCONTINUED | OUTPATIENT
Start: 2018-09-14 | End: 2018-09-26 | Stop reason: HOSPADM

## 2018-09-14 RX ORDER — NITROGLYCERIN 0.4 MG/1
0.4 TABLET SUBLINGUAL
Status: DISCONTINUED | OUTPATIENT
Start: 2018-09-14 | End: 2018-09-26 | Stop reason: HOSPADM

## 2018-09-14 RX ORDER — ONDANSETRON 2 MG/ML
4 INJECTION INTRAMUSCULAR; INTRAVENOUS EVERY 6 HOURS PRN
Status: DISCONTINUED | OUTPATIENT
Start: 2018-09-14 | End: 2018-09-26 | Stop reason: HOSPADM

## 2018-09-14 RX ORDER — ACETAMINOPHEN 325 MG/1
650 TABLET ORAL EVERY 4 HOURS PRN
Status: DISCONTINUED | OUTPATIENT
Start: 2018-09-14 | End: 2018-09-26 | Stop reason: HOSPADM

## 2018-09-14 RX ORDER — GUAIFENESIN 600 MG/1
600 TABLET, EXTENDED RELEASE ORAL EVERY 12 HOURS SCHEDULED
Status: DISCONTINUED | OUTPATIENT
Start: 2018-09-14 | End: 2018-09-26 | Stop reason: HOSPADM

## 2018-09-14 RX ORDER — NICOTINE 21 MG/24HR
1 PATCH, TRANSDERMAL 24 HOURS TRANSDERMAL EVERY 24 HOURS
Status: DISCONTINUED | OUTPATIENT
Start: 2018-09-14 | End: 2018-09-26 | Stop reason: HOSPADM

## 2018-09-14 RX ADMIN — ENOXAPARIN SODIUM 40 MG: 40 INJECTION SUBCUTANEOUS at 16:39

## 2018-09-14 RX ADMIN — NICOTINE 1 PATCH: 14 PATCH, EXTENDED RELEASE TRANSDERMAL at 18:35

## 2018-09-14 RX ADMIN — FUROSEMIDE 40 MG: 10 INJECTION, SOLUTION INTRAMUSCULAR; INTRAVENOUS at 16:39

## 2018-09-14 RX ADMIN — BUDESONIDE 0.25 MG: 0.25 INHALANT RESPIRATORY (INHALATION) at 20:00

## 2018-09-14 RX ADMIN — GUAIFENESIN 600 MG: 600 TABLET, EXTENDED RELEASE ORAL at 20:57

## 2018-09-14 RX ADMIN — NICOTINE POLACRILEX 2 MG: 2 GUM, CHEWING ORAL at 18:35

## 2018-09-14 RX ADMIN — ARFORMOTEROL TARTRATE 15 MCG: 15 SOLUTION RESPIRATORY (INHALATION) at 20:00

## 2018-09-15 ENCOUNTER — APPOINTMENT (OUTPATIENT)
Dept: CARDIOLOGY | Facility: HOSPITAL | Age: 58
End: 2018-09-15
Attending: INTERNAL MEDICINE

## 2018-09-15 LAB
ANION GAP SERPL CALCULATED.3IONS-SCNC: 8.4 MMOL/L
BASOPHILS # BLD AUTO: 0.01 10*3/MM3 (ref 0–0.2)
BASOPHILS NFR BLD AUTO: 0.1 % (ref 0–1.5)
BH CV ECHO MEAS - ACS: 1.6 CM
BH CV ECHO MEAS - AO MEAN PG (FULL): 2 MMHG
BH CV ECHO MEAS - AO MEAN PG: 6 MMHG
BH CV ECHO MEAS - AO V2 MAX: 179 CM/SEC
BH CV ECHO MEAS - AO V2 MEAN: 116 CM/SEC
BH CV ECHO MEAS - AO V2 VTI: 25.8 CM
BH CV ECHO MEAS - AVA(I,A): 2.9 CM^2
BH CV ECHO MEAS - AVA(I,D): 2.9 CM^2
BH CV ECHO MEAS - BSA(HAYCOCK): 2 M^2
BH CV ECHO MEAS - BSA: 2 M^2
BH CV ECHO MEAS - BZI_BMI: 24.4 KILOGRAMS/M^2
BH CV ECHO MEAS - BZI_METRIC_HEIGHT: 180.3 CM
BH CV ECHO MEAS - BZI_METRIC_WEIGHT: 79.4 KG
BH CV ECHO MEAS - EDV(CUBED): 103.8 ML
BH CV ECHO MEAS - EDV(MOD-SP4): 94 ML
BH CV ECHO MEAS - EDV(TEICH): 102.4 ML
BH CV ECHO MEAS - EF(CUBED): 74 %
BH CV ECHO MEAS - EF(MOD-SP4): 58.5 %
BH CV ECHO MEAS - EF(TEICH): 65.8 %
BH CV ECHO MEAS - ESV(CUBED): 27 ML
BH CV ECHO MEAS - ESV(MOD-SP4): 39 ML
BH CV ECHO MEAS - ESV(TEICH): 35 ML
BH CV ECHO MEAS - FS: 36.2 %
BH CV ECHO MEAS - IVS/LVPW: 1
BH CV ECHO MEAS - IVSD: 1 CM
BH CV ECHO MEAS - LA DIMENSION: 6 CM
BH CV ECHO MEAS - LAT PEAK E' VEL: 12 CM/SEC
BH CV ECHO MEAS - LV DIASTOLIC VOL/BSA (35-75): 47.2 ML/M^2
BH CV ECHO MEAS - LV MASS(C)D: 164.5 GRAMS
BH CV ECHO MEAS - LV MASS(C)DI: 82.5 GRAMS/M^2
BH CV ECHO MEAS - LV MEAN PG: 4 MMHG
BH CV ECHO MEAS - LV SYSTOLIC VOL/BSA (12-30): 19.6 ML/M^2
BH CV ECHO MEAS - LV V1 MAX: 134 CM/SEC
BH CV ECHO MEAS - LV V1 MEAN: 91.3 CM/SEC
BH CV ECHO MEAS - LV V1 VTI: 23.7 CM
BH CV ECHO MEAS - LVIDD: 4.7 CM
BH CV ECHO MEAS - LVIDS: 3 CM
BH CV ECHO MEAS - LVLD AP4: 8.6 CM
BH CV ECHO MEAS - LVLS AP4: 7.2 CM
BH CV ECHO MEAS - LVOT AREA (M): 3.1 CM^2
BH CV ECHO MEAS - LVOT AREA: 3.1 CM^2
BH CV ECHO MEAS - LVOT DIAM: 2 CM
BH CV ECHO MEAS - LVPWD: 1 CM
BH CV ECHO MEAS - MED PEAK E' VEL: 11 CM/SEC
BH CV ECHO MEAS - MV A DUR: 0.09 SEC
BH CV ECHO MEAS - MV A MAX VEL: 101 CM/SEC
BH CV ECHO MEAS - MV DEC TIME: 0.14 SEC
BH CV ECHO MEAS - MV E MAX VEL: 82.9 CM/SEC
BH CV ECHO MEAS - MV E/A: 0.82
BH CV ECHO MEAS - PA ACC SLOPE: 0 CM/SEC^2
BH CV ECHO MEAS - PA ACC TIME: 0.09 SEC
BH CV ECHO MEAS - PA MAX PG (FULL): 1.8 MMHG
BH CV ECHO MEAS - PA MAX PG: 4.2 MMHG
BH CV ECHO MEAS - PA PR(ACCEL): 37.6 MMHG
BH CV ECHO MEAS - PA V2 MAX: 103 CM/SEC
BH CV ECHO MEAS - PVA(V,A): 1.9 CM^2
BH CV ECHO MEAS - PVA(V,D): 1.9 CM^2
BH CV ECHO MEAS - QP/QS: 0.47
BH CV ECHO MEAS - RAP SYSTOLE: 3 MMHG
BH CV ECHO MEAS - RV MAX PG: 2.4 MMHG
BH CV ECHO MEAS - RV MEAN PG: 1 MMHG
BH CV ECHO MEAS - RV V1 MAX: 77.5 CM/SEC
BH CV ECHO MEAS - RV V1 MEAN: 48.7 CM/SEC
BH CV ECHO MEAS - RV V1 VTI: 13.7 CM
BH CV ECHO MEAS - RVOT AREA: 2.5 CM^2
BH CV ECHO MEAS - RVOT DIAM: 1.8 CM
BH CV ECHO MEAS - RVSP: 38 MMHG
BH CV ECHO MEAS - SI(CUBED): 38.6 ML/M^2
BH CV ECHO MEAS - SI(LVOT): 37.4 ML/M^2
BH CV ECHO MEAS - SI(MOD-SP4): 27.6 ML/M^2
BH CV ECHO MEAS - SI(TEICH): 33.8 ML/M^2
BH CV ECHO MEAS - SV(CUBED): 76.8 ML
BH CV ECHO MEAS - SV(LVOT): 74.5 ML
BH CV ECHO MEAS - SV(MOD-SP4): 55 ML
BH CV ECHO MEAS - SV(RVOT): 34.9 ML
BH CV ECHO MEAS - SV(TEICH): 67.4 ML
BH CV ECHO MEAS - TAPSE (>1.6): 2.5 CM2
BH CV ECHO MEAS - TR MAX VEL: 296 CM/SEC
BH CV ECHO MEASUREMENTS AVERAGE E/E' RATIO: 7.21
BH CV XLRA - RV BASE: 3.2 CM
BH CV XLRA - TDI S': 18 CM/SEC
BUN BLD-MCNC: 17 MG/DL (ref 6–20)
BUN/CREAT SERPL: 25.8 (ref 7–25)
CALCIUM SPEC-SCNC: 9.6 MG/DL (ref 8.6–10.5)
CHLORIDE SERPL-SCNC: 93 MMOL/L (ref 98–107)
CO2 SERPL-SCNC: 44.6 MMOL/L (ref 22–29)
CREAT BLD-MCNC: 0.66 MG/DL (ref 0.76–1.27)
DEPRECATED RDW RBC AUTO: 53.6 FL (ref 37–54)
EOSINOPHIL # BLD AUTO: 0.19 10*3/MM3 (ref 0–0.7)
EOSINOPHIL NFR BLD AUTO: 2.6 % (ref 0.3–6.2)
ERYTHROCYTE [DISTWIDTH] IN BLOOD BY AUTOMATED COUNT: 14.5 % (ref 11.5–14.5)
GFR SERPL CREATININE-BSD FRML MDRD: 124 ML/MIN/1.73
GLUCOSE BLD-MCNC: 112 MG/DL (ref 65–99)
HCT VFR BLD AUTO: 48.9 % (ref 40.4–52.2)
HGB BLD-MCNC: 15.3 G/DL (ref 13.7–17.6)
IMM GRANULOCYTES # BLD: 0.01 10*3/MM3 (ref 0–0.03)
IMM GRANULOCYTES NFR BLD: 0.1 % (ref 0–0.5)
LEFT ATRIUM VOLUME INDEX: 18 ML/M2
LEFT ATRIUM VOLUME: 40 CM3
LV EF 2D ECHO EST: 60 %
LYMPHOCYTES # BLD AUTO: 1.31 10*3/MM3 (ref 0.9–4.8)
LYMPHOCYTES NFR BLD AUTO: 17.9 % (ref 19.6–45.3)
MAXIMAL PREDICTED HEART RATE: 161 BPM
MCH RBC QN AUTO: 31.4 PG (ref 27–32.7)
MCHC RBC AUTO-ENTMCNC: 31.3 G/DL (ref 32.6–36.4)
MCV RBC AUTO: 100.4 FL (ref 79.8–96.2)
MONOCYTES # BLD AUTO: 0.85 10*3/MM3 (ref 0.2–1.2)
MONOCYTES NFR BLD AUTO: 11.6 % (ref 5–12)
NEUTROPHILS # BLD AUTO: 4.94 10*3/MM3 (ref 1.9–8.1)
NEUTROPHILS NFR BLD AUTO: 67.8 % (ref 42.7–76)
PLATELET # BLD AUTO: 258 10*3/MM3 (ref 140–500)
PMV BLD AUTO: 9.9 FL (ref 6–12)
POTASSIUM BLD-SCNC: 3.8 MMOL/L (ref 3.5–5.2)
RBC # BLD AUTO: 4.87 10*6/MM3 (ref 4.6–6)
SODIUM BLD-SCNC: 146 MMOL/L (ref 136–145)
STRESS TARGET HR: 137 BPM
WBC NRBC COR # BLD: 7.3 10*3/MM3 (ref 4.5–10.7)

## 2018-09-15 PROCEDURE — 85025 COMPLETE CBC W/AUTO DIFF WBC: CPT | Performed by: INTERNAL MEDICINE

## 2018-09-15 PROCEDURE — 94799 UNLISTED PULMONARY SVC/PX: CPT

## 2018-09-15 PROCEDURE — 93010 ELECTROCARDIOGRAM REPORT: CPT | Performed by: INTERNAL MEDICINE

## 2018-09-15 PROCEDURE — 93306 TTE W/DOPPLER COMPLETE: CPT

## 2018-09-15 PROCEDURE — 99253 IP/OBS CNSLTJ NEW/EST LOW 45: CPT | Performed by: INTERNAL MEDICINE

## 2018-09-15 PROCEDURE — 80048 BASIC METABOLIC PNL TOTAL CA: CPT | Performed by: INTERNAL MEDICINE

## 2018-09-15 PROCEDURE — 93306 TTE W/DOPPLER COMPLETE: CPT | Performed by: INTERNAL MEDICINE

## 2018-09-15 PROCEDURE — 93005 ELECTROCARDIOGRAM TRACING: CPT | Performed by: INTERNAL MEDICINE

## 2018-09-15 PROCEDURE — 25010000002 ENOXAPARIN PER 10 MG: Performed by: INTERNAL MEDICINE

## 2018-09-15 RX ADMIN — BUDESONIDE 0.25 MG: 0.25 INHALANT RESPIRATORY (INHALATION) at 08:53

## 2018-09-15 RX ADMIN — GUAIFENESIN 600 MG: 600 TABLET, EXTENDED RELEASE ORAL at 22:52

## 2018-09-15 RX ADMIN — GUAIFENESIN 600 MG: 600 TABLET, EXTENDED RELEASE ORAL at 09:48

## 2018-09-15 RX ADMIN — NICOTINE 1 PATCH: 14 PATCH, EXTENDED RELEASE TRANSDERMAL at 18:49

## 2018-09-15 RX ADMIN — ARFORMOTEROL TARTRATE 15 MCG: 15 SOLUTION RESPIRATORY (INHALATION) at 20:13

## 2018-09-15 RX ADMIN — ARFORMOTEROL TARTRATE 15 MCG: 15 SOLUTION RESPIRATORY (INHALATION) at 08:52

## 2018-09-15 RX ADMIN — NICOTINE POLACRILEX 2 MG: 2 GUM, CHEWING ORAL at 22:53

## 2018-09-15 RX ADMIN — BUDESONIDE 0.25 MG: 0.25 INHALANT RESPIRATORY (INHALATION) at 20:13

## 2018-09-15 RX ADMIN — ENOXAPARIN SODIUM 40 MG: 40 INJECTION SUBCUTANEOUS at 18:49

## 2018-09-15 RX ADMIN — ACETAMINOPHEN 650 MG: 325 TABLET ORAL at 07:35

## 2018-09-16 ENCOUNTER — APPOINTMENT (OUTPATIENT)
Dept: CARDIOLOGY | Facility: HOSPITAL | Age: 58
End: 2018-09-16
Attending: INTERNAL MEDICINE

## 2018-09-16 ENCOUNTER — APPOINTMENT (OUTPATIENT)
Dept: ULTRASOUND IMAGING | Facility: HOSPITAL | Age: 58
End: 2018-09-16

## 2018-09-16 ENCOUNTER — APPOINTMENT (OUTPATIENT)
Dept: CT IMAGING | Facility: HOSPITAL | Age: 58
End: 2018-09-16

## 2018-09-16 LAB
ANION GAP SERPL CALCULATED.3IONS-SCNC: 7.1 MMOL/L
BASOPHILS # BLD AUTO: 0.01 10*3/MM3 (ref 0–0.2)
BASOPHILS NFR BLD AUTO: 0.1 % (ref 0–1.5)
BH CV LOWER VASCULAR LEFT COMMON FEMORAL AUGMENT: NORMAL
BH CV LOWER VASCULAR LEFT COMMON FEMORAL COMPETENT: NORMAL
BH CV LOWER VASCULAR LEFT COMMON FEMORAL COMPRESS: NORMAL
BH CV LOWER VASCULAR LEFT COMMON FEMORAL PHASIC: NORMAL
BH CV LOWER VASCULAR LEFT COMMON FEMORAL SPONT: NORMAL
BH CV LOWER VASCULAR LEFT DISTAL FEMORAL COMPRESS: NORMAL
BH CV LOWER VASCULAR LEFT GASTRONEMIUS COMPRESS: NORMAL
BH CV LOWER VASCULAR LEFT GREATER SAPH AK COMPRESS: NORMAL
BH CV LOWER VASCULAR LEFT GREATER SAPH BK COMPRESS: NORMAL
BH CV LOWER VASCULAR LEFT LESSER SAPH COMPRESS: NORMAL
BH CV LOWER VASCULAR LEFT MID FEMORAL AUGMENT: NORMAL
BH CV LOWER VASCULAR LEFT MID FEMORAL COMPETENT: NORMAL
BH CV LOWER VASCULAR LEFT MID FEMORAL COMPRESS: NORMAL
BH CV LOWER VASCULAR LEFT MID FEMORAL PHASIC: NORMAL
BH CV LOWER VASCULAR LEFT MID FEMORAL SPONT: NORMAL
BH CV LOWER VASCULAR LEFT PERONEAL COMPRESS: NORMAL
BH CV LOWER VASCULAR LEFT POPLITEAL AUGMENT: NORMAL
BH CV LOWER VASCULAR LEFT POPLITEAL COMPETENT: NORMAL
BH CV LOWER VASCULAR LEFT POPLITEAL COMPRESS: NORMAL
BH CV LOWER VASCULAR LEFT POPLITEAL PHASIC: NORMAL
BH CV LOWER VASCULAR LEFT POPLITEAL SPONT: NORMAL
BH CV LOWER VASCULAR LEFT POSTERIOR TIBIAL COMPRESS: NORMAL
BH CV LOWER VASCULAR LEFT PROXIMAL FEMORAL COMPRESS: NORMAL
BH CV LOWER VASCULAR LEFT SAPHENOFEMORAL JUNCTION AUGMENT: NORMAL
BH CV LOWER VASCULAR LEFT SAPHENOFEMORAL JUNCTION COMPRESS: NORMAL
BH CV LOWER VASCULAR LEFT SAPHENOFEMORAL JUNCTION PHASIC: NORMAL
BH CV LOWER VASCULAR LEFT SAPHENOFEMORAL JUNCTION SPONT: NORMAL
BH CV LOWER VASCULAR RIGHT COMMON FEMORAL AUGMENT: NORMAL
BH CV LOWER VASCULAR RIGHT COMMON FEMORAL COMPETENT: NORMAL
BH CV LOWER VASCULAR RIGHT COMMON FEMORAL COMPRESS: NORMAL
BH CV LOWER VASCULAR RIGHT COMMON FEMORAL PHASIC: NORMAL
BH CV LOWER VASCULAR RIGHT COMMON FEMORAL SPONT: NORMAL
BH CV LOWER VASCULAR RIGHT DISTAL FEMORAL COMPRESS: NORMAL
BH CV LOWER VASCULAR RIGHT GASTRONEMIUS COMPRESS: NORMAL
BH CV LOWER VASCULAR RIGHT GREATER SAPH AK COMPRESS: NORMAL
BH CV LOWER VASCULAR RIGHT GREATER SAPH BK COMPRESS: NORMAL
BH CV LOWER VASCULAR RIGHT LESSER SAPH COMPRESS: NORMAL
BH CV LOWER VASCULAR RIGHT MID FEMORAL AUGMENT: NORMAL
BH CV LOWER VASCULAR RIGHT MID FEMORAL COMPETENT: NORMAL
BH CV LOWER VASCULAR RIGHT MID FEMORAL COMPRESS: NORMAL
BH CV LOWER VASCULAR RIGHT MID FEMORAL PHASIC: NORMAL
BH CV LOWER VASCULAR RIGHT MID FEMORAL SPONT: NORMAL
BH CV LOWER VASCULAR RIGHT PERONEAL COMPRESS: NORMAL
BH CV LOWER VASCULAR RIGHT POPLITEAL AUGMENT: NORMAL
BH CV LOWER VASCULAR RIGHT POPLITEAL COMPETENT: NORMAL
BH CV LOWER VASCULAR RIGHT POPLITEAL COMPRESS: NORMAL
BH CV LOWER VASCULAR RIGHT POPLITEAL PHASIC: NORMAL
BH CV LOWER VASCULAR RIGHT POPLITEAL SPONT: NORMAL
BH CV LOWER VASCULAR RIGHT POSTERIOR TIBIAL COMPRESS: NORMAL
BH CV LOWER VASCULAR RIGHT PROXIMAL FEMORAL COMPRESS: NORMAL
BH CV LOWER VASCULAR RIGHT SAPHENOFEMORAL JUNCTION AUGMENT: NORMAL
BH CV LOWER VASCULAR RIGHT SAPHENOFEMORAL JUNCTION COMPRESS: NORMAL
BH CV LOWER VASCULAR RIGHT SAPHENOFEMORAL JUNCTION PHASIC: NORMAL
BH CV LOWER VASCULAR RIGHT SAPHENOFEMORAL JUNCTION SPONT: NORMAL
BUN BLD-MCNC: 16 MG/DL (ref 6–20)
BUN/CREAT SERPL: 27.1 (ref 7–25)
CALCIUM SPEC-SCNC: 9.7 MG/DL (ref 8.6–10.5)
CHLORIDE SERPL-SCNC: 95 MMOL/L (ref 98–107)
CO2 SERPL-SCNC: 42.9 MMOL/L (ref 22–29)
CREAT BLD-MCNC: 0.59 MG/DL (ref 0.76–1.27)
DEPRECATED RDW RBC AUTO: 52.3 FL (ref 37–54)
EOSINOPHIL # BLD AUTO: 0.17 10*3/MM3 (ref 0–0.7)
EOSINOPHIL NFR BLD AUTO: 2.4 % (ref 0.3–6.2)
ERYTHROCYTE [DISTWIDTH] IN BLOOD BY AUTOMATED COUNT: 14.2 % (ref 11.5–14.5)
GFR SERPL CREATININE-BSD FRML MDRD: 141 ML/MIN/1.73
GLUCOSE BLD-MCNC: 121 MG/DL (ref 65–99)
HCT VFR BLD AUTO: 50.4 % (ref 40.4–52.2)
HGB BLD-MCNC: 14.7 G/DL (ref 13.7–17.6)
IMM GRANULOCYTES # BLD: 0 10*3/MM3 (ref 0–0.03)
IMM GRANULOCYTES NFR BLD: 0 % (ref 0–0.5)
LYMPHOCYTES # BLD AUTO: 1.56 10*3/MM3 (ref 0.9–4.8)
LYMPHOCYTES NFR BLD AUTO: 21.8 % (ref 19.6–45.3)
MCH RBC QN AUTO: 29.5 PG (ref 27–32.7)
MCHC RBC AUTO-ENTMCNC: 29.2 G/DL (ref 32.6–36.4)
MCV RBC AUTO: 101.2 FL (ref 79.8–96.2)
MONOCYTES # BLD AUTO: 0.57 10*3/MM3 (ref 0.2–1.2)
MONOCYTES NFR BLD AUTO: 8 % (ref 5–12)
NEUTROPHILS # BLD AUTO: 4.84 10*3/MM3 (ref 1.9–8.1)
NEUTROPHILS NFR BLD AUTO: 67.7 % (ref 42.7–76)
PLATELET # BLD AUTO: 248 10*3/MM3 (ref 140–500)
PMV BLD AUTO: 10.4 FL (ref 6–12)
POTASSIUM BLD-SCNC: 4.4 MMOL/L (ref 3.5–5.2)
RBC # BLD AUTO: 4.98 10*6/MM3 (ref 4.6–6)
SODIUM BLD-SCNC: 145 MMOL/L (ref 136–145)
WBC NRBC COR # BLD: 7.15 10*3/MM3 (ref 4.5–10.7)

## 2018-09-16 PROCEDURE — 80048 BASIC METABOLIC PNL TOTAL CA: CPT | Performed by: INTERNAL MEDICINE

## 2018-09-16 PROCEDURE — 76705 ECHO EXAM OF ABDOMEN: CPT

## 2018-09-16 PROCEDURE — 85025 COMPLETE CBC W/AUTO DIFF WBC: CPT | Performed by: INTERNAL MEDICINE

## 2018-09-16 PROCEDURE — 94799 UNLISTED PULMONARY SVC/PX: CPT

## 2018-09-16 PROCEDURE — 93970 EXTREMITY STUDY: CPT

## 2018-09-16 PROCEDURE — 0 DIATRIZOATE MEGLUMINE & SODIUM PER 1 ML: Performed by: INTERNAL MEDICINE

## 2018-09-16 PROCEDURE — 99231 SBSQ HOSP IP/OBS SF/LOW 25: CPT | Performed by: INTERNAL MEDICINE

## 2018-09-16 PROCEDURE — 25010000002 ENOXAPARIN PER 10 MG: Performed by: INTERNAL MEDICINE

## 2018-09-16 RX ADMIN — BUDESONIDE 0.25 MG: 0.25 INHALANT RESPIRATORY (INHALATION) at 22:45

## 2018-09-16 RX ADMIN — BUDESONIDE 0.25 MG: 0.25 INHALANT RESPIRATORY (INHALATION) at 07:38

## 2018-09-16 RX ADMIN — ARFORMOTEROL TARTRATE 15 MCG: 15 SOLUTION RESPIRATORY (INHALATION) at 07:38

## 2018-09-16 RX ADMIN — NICOTINE 1 PATCH: 14 PATCH, EXTENDED RELEASE TRANSDERMAL at 18:48

## 2018-09-16 RX ADMIN — ARFORMOTEROL TARTRATE 15 MCG: 15 SOLUTION RESPIRATORY (INHALATION) at 22:45

## 2018-09-16 RX ADMIN — GUAIFENESIN 600 MG: 600 TABLET, EXTENDED RELEASE ORAL at 21:46

## 2018-09-16 RX ADMIN — GUAIFENESIN 600 MG: 600 TABLET, EXTENDED RELEASE ORAL at 09:34

## 2018-09-16 RX ADMIN — ENOXAPARIN SODIUM 40 MG: 40 INJECTION SUBCUTANEOUS at 18:48

## 2018-09-16 RX ADMIN — DIATRIZOATE MEGLUMINE AND DIATRIZOATE SODIUM 30 ML: 600; 100 SOLUTION ORAL; RECTAL at 20:03

## 2018-09-17 LAB
ANION GAP SERPL CALCULATED.3IONS-SCNC: 7.9 MMOL/L
BASOPHILS # BLD AUTO: 0.02 10*3/MM3 (ref 0–0.2)
BASOPHILS NFR BLD AUTO: 0.3 % (ref 0–1.5)
BUN BLD-MCNC: 14 MG/DL (ref 6–20)
BUN/CREAT SERPL: 24.6 (ref 7–25)
CALCIUM SPEC-SCNC: 9.4 MG/DL (ref 8.6–10.5)
CHLORIDE SERPL-SCNC: 95 MMOL/L (ref 98–107)
CO2 SERPL-SCNC: 41.1 MMOL/L (ref 22–29)
CREAT BLD-MCNC: 0.57 MG/DL (ref 0.76–1.27)
D DIMER PPP FEU-MCNC: <0.27 MCGFEU/ML (ref 0–0.49)
DEPRECATED RDW RBC AUTO: 52.1 FL (ref 37–54)
EOSINOPHIL # BLD AUTO: 0.11 10*3/MM3 (ref 0–0.7)
EOSINOPHIL NFR BLD AUTO: 1.7 % (ref 0.3–6.2)
ERYTHROCYTE [DISTWIDTH] IN BLOOD BY AUTOMATED COUNT: 14.1 % (ref 11.5–14.5)
GFR SERPL CREATININE-BSD FRML MDRD: 146 ML/MIN/1.73
GLUCOSE BLD-MCNC: 89 MG/DL (ref 65–99)
HCT VFR BLD AUTO: 47.4 % (ref 40.4–52.2)
HGB BLD-MCNC: 14.8 G/DL (ref 13.7–17.6)
IMM GRANULOCYTES # BLD: 0.01 10*3/MM3 (ref 0–0.03)
IMM GRANULOCYTES NFR BLD: 0.2 % (ref 0–0.5)
LYMPHOCYTES # BLD AUTO: 1.23 10*3/MM3 (ref 0.9–4.8)
LYMPHOCYTES NFR BLD AUTO: 19 % (ref 19.6–45.3)
MCH RBC QN AUTO: 31.1 PG (ref 27–32.7)
MCHC RBC AUTO-ENTMCNC: 31.2 G/DL (ref 32.6–36.4)
MCV RBC AUTO: 99.6 FL (ref 79.8–96.2)
MONOCYTES # BLD AUTO: 0.75 10*3/MM3 (ref 0.2–1.2)
MONOCYTES NFR BLD AUTO: 11.6 % (ref 5–12)
NEUTROPHILS # BLD AUTO: 4.35 10*3/MM3 (ref 1.9–8.1)
NEUTROPHILS NFR BLD AUTO: 67.4 % (ref 42.7–76)
PLATELET # BLD AUTO: 249 10*3/MM3 (ref 140–500)
PMV BLD AUTO: 10 FL (ref 6–12)
POTASSIUM BLD-SCNC: 4.2 MMOL/L (ref 3.5–5.2)
RBC # BLD AUTO: 4.76 10*6/MM3 (ref 4.6–6)
SODIUM BLD-SCNC: 144 MMOL/L (ref 136–145)
WBC NRBC COR # BLD: 6.46 10*3/MM3 (ref 4.5–10.7)

## 2018-09-17 PROCEDURE — 85025 COMPLETE CBC W/AUTO DIFF WBC: CPT | Performed by: INTERNAL MEDICINE

## 2018-09-17 PROCEDURE — 94799 UNLISTED PULMONARY SVC/PX: CPT

## 2018-09-17 PROCEDURE — 85379 FIBRIN DEGRADATION QUANT: CPT | Performed by: INTERNAL MEDICINE

## 2018-09-17 PROCEDURE — 80048 BASIC METABOLIC PNL TOTAL CA: CPT | Performed by: INTERNAL MEDICINE

## 2018-09-17 PROCEDURE — 25010000002 ENOXAPARIN PER 10 MG: Performed by: INTERNAL MEDICINE

## 2018-09-17 RX ORDER — PREDNISONE 20 MG/1
40 TABLET ORAL
Status: DISCONTINUED | OUTPATIENT
Start: 2018-09-18 | End: 2018-09-18

## 2018-09-17 RX ORDER — ECHINACEA PURPUREA EXTRACT 125 MG
2 TABLET ORAL AS NEEDED
Status: DISCONTINUED | OUTPATIENT
Start: 2018-09-17 | End: 2018-09-26 | Stop reason: HOSPADM

## 2018-09-17 RX ORDER — LORAZEPAM 0.5 MG/1
0.5 TABLET ORAL NIGHTLY
Status: DISCONTINUED | OUTPATIENT
Start: 2018-09-17 | End: 2018-09-18

## 2018-09-17 RX ADMIN — ARFORMOTEROL TARTRATE 15 MCG: 15 SOLUTION RESPIRATORY (INHALATION) at 21:15

## 2018-09-17 RX ADMIN — NICOTINE POLACRILEX 2 MG: 2 GUM, CHEWING ORAL at 18:55

## 2018-09-17 RX ADMIN — BUDESONIDE 0.25 MG: 0.25 INHALANT RESPIRATORY (INHALATION) at 08:24

## 2018-09-17 RX ADMIN — GUAIFENESIN 600 MG: 600 TABLET, EXTENDED RELEASE ORAL at 08:51

## 2018-09-17 RX ADMIN — GUAIFENESIN 600 MG: 600 TABLET, EXTENDED RELEASE ORAL at 21:28

## 2018-09-17 RX ADMIN — ACETAMINOPHEN 650 MG: 325 TABLET ORAL at 22:56

## 2018-09-17 RX ADMIN — ARFORMOTEROL TARTRATE 15 MCG: 15 SOLUTION RESPIRATORY (INHALATION) at 08:24

## 2018-09-17 RX ADMIN — BUDESONIDE 0.25 MG: 0.25 INHALANT RESPIRATORY (INHALATION) at 21:15

## 2018-09-17 RX ADMIN — ENOXAPARIN SODIUM 40 MG: 40 INJECTION SUBCUTANEOUS at 17:55

## 2018-09-17 RX ADMIN — NICOTINE 1 PATCH: 14 PATCH, EXTENDED RELEASE TRANSDERMAL at 17:55

## 2018-09-17 RX ADMIN — LORAZEPAM 0.5 MG: 0.5 TABLET ORAL at 22:19

## 2018-09-18 LAB
ANION GAP SERPL CALCULATED.3IONS-SCNC: 4.9 MMOL/L
ARTERIAL PATENCY WRIST A: ABNORMAL
ARTERIAL PATENCY WRIST A: POSITIVE
ATMOSPHERIC PRESS: 747 MMHG
ATMOSPHERIC PRESS: 750.5 MMHG
ATMOSPHERIC PRESS: 751.1 MMHG
ATMOSPHERIC PRESS: 751.8 MMHG
BASE EXCESS BLDA CALC-SCNC: 10 MMOL/L (ref 0–2)
BASE EXCESS BLDA CALC-SCNC: 10.5 MMOL/L (ref 0–2)
BASE EXCESS BLDA CALC-SCNC: 11.3 MMOL/L (ref 0–2)
BASE EXCESS BLDA CALC-SCNC: 9.6 MMOL/L (ref 0–2)
BASOPHILS # BLD AUTO: 0.01 10*3/MM3 (ref 0–0.2)
BASOPHILS NFR BLD AUTO: 0.2 % (ref 0–1.5)
BDY SITE: ABNORMAL
BUN BLD-MCNC: 17 MG/DL (ref 6–20)
BUN/CREAT SERPL: 36.2 (ref 7–25)
CALCIUM SPEC-SCNC: 8.8 MG/DL (ref 8.6–10.5)
CHLORIDE SERPL-SCNC: 99 MMOL/L (ref 98–107)
CO2 SERPL-SCNC: 38.1 MMOL/L (ref 22–29)
CREAT BLD-MCNC: 0.47 MG/DL (ref 0.76–1.27)
DEPRECATED RDW RBC AUTO: 52.4 FL (ref 37–54)
EOSINOPHIL # BLD AUTO: 0.08 10*3/MM3 (ref 0–0.7)
EOSINOPHIL NFR BLD AUTO: 1.4 % (ref 0.3–6.2)
ERYTHROCYTE [DISTWIDTH] IN BLOOD BY AUTOMATED COUNT: 14.1 % (ref 11.5–14.5)
GAS FLOW AIRWAY: 3 LPM
GFR SERPL CREATININE-BSD FRML MDRD: >150 ML/MIN/1.73
GLUCOSE BLD-MCNC: 128 MG/DL (ref 65–99)
GLUCOSE BLDC GLUCOMTR-MCNC: 110 MG/DL (ref 70–130)
GLUCOSE BLDC GLUCOMTR-MCNC: 117 MG/DL (ref 70–130)
HCO3 BLDA-SCNC: 37.7 MMOL/L (ref 22–28)
HCO3 BLDA-SCNC: 42 MMOL/L (ref 22–28)
HCO3 BLDA-SCNC: 42 MMOL/L (ref 22–28)
HCO3 BLDA-SCNC: 42.3 MMOL/L (ref 22–28)
HCT VFR BLD AUTO: 43.8 % (ref 40.4–52.2)
HGB BLD-MCNC: 13.5 G/DL (ref 13.7–17.6)
HOROWITZ INDEX BLD+IHG-RTO: 30 %
HOROWITZ INDEX BLD+IHG-RTO: 30 %
HOROWITZ INDEX BLD+IHG-RTO: 40 %
IMM GRANULOCYTES # BLD: 0.01 10*3/MM3 (ref 0–0.03)
IMM GRANULOCYTES NFR BLD: 0.2 % (ref 0–0.5)
LYMPHOCYTES # BLD AUTO: 1.3 10*3/MM3 (ref 0.9–4.8)
LYMPHOCYTES NFR BLD AUTO: 22.6 % (ref 19.6–45.3)
MCH RBC QN AUTO: 30.8 PG (ref 27–32.7)
MCHC RBC AUTO-ENTMCNC: 30.8 G/DL (ref 32.6–36.4)
MCV RBC AUTO: 100 FL (ref 79.8–96.2)
MODALITY: ABNORMAL
MONOCYTES # BLD AUTO: 0.53 10*3/MM3 (ref 0.2–1.2)
MONOCYTES NFR BLD AUTO: 9.2 % (ref 5–12)
NEUTROPHILS # BLD AUTO: 3.82 10*3/MM3 (ref 1.9–8.1)
NEUTROPHILS NFR BLD AUTO: 66.6 % (ref 42.7–76)
NRBC BLD MANUAL-RTO: 0 /100 WBC (ref 0–0)
O2 A-A PPRESDIFF RESPIRATORY: 0.5 MMHG
O2 A-A PPRESDIFF RESPIRATORY: 0.5 MMHG
O2 A-A PPRESDIFF RESPIRATORY: 0.7 MMHG
PCO2 BLDA: 63.6 MM HG (ref 35–45)
PCO2 BLDA: 85.3 MM HG (ref 35–45)
PCO2 BLDA: 90.7 MM HG (ref 35–45)
PCO2 BLDA: 93.4 MM HG (ref 35–45)
PH BLDA: 7.26 PH UNITS (ref 7.35–7.45)
PH BLDA: 7.27 PH UNITS (ref 7.35–7.45)
PH BLDA: 7.3 PH UNITS (ref 7.35–7.45)
PH BLDA: 7.38 PH UNITS (ref 7.35–7.45)
PLATELET # BLD AUTO: 222 10*3/MM3 (ref 140–500)
PMV BLD AUTO: 9.9 FL (ref 6–12)
PO2 BLDA: 57.6 MM HG (ref 80–100)
PO2 BLDA: 75.1 MM HG (ref 80–100)
PO2 BLDA: 77.4 MM HG (ref 80–100)
PO2 BLDA: 98.1 MM HG (ref 80–100)
POTASSIUM BLD-SCNC: 4.2 MMOL/L (ref 3.5–5.2)
PSV: 10 CMH2O
RBC # BLD AUTO: 4.38 10*6/MM3 (ref 4.6–6)
SAO2 % BLDCOA: 83.1 % (ref 92–99)
SAO2 % BLDCOA: 92.7 % (ref 92–99)
SAO2 % BLDCOA: 94 % (ref 92–99)
SAO2 % BLDCOA: 95.7 % (ref 92–99)
SET MECH RESP RATE: 14
SET MECH RESP RATE: 18
SET MECH RESP RATE: 18
SODIUM BLD-SCNC: 142 MMOL/L (ref 136–145)
TOTAL RATE: 14 BREATHS/MINUTE
TOTAL RATE: 18 BREATHS/MINUTE
TOTAL RATE: 20 BREATHS/MINUTE
TOTAL RATE: 22 BREATHS/MINUTE
VENTILATOR MODE: ABNORMAL
VENTILATOR MODE: ABNORMAL
VT ON VENT VENT: 385 ML
VT ON VENT VENT: 424 ML
WBC NRBC COR # BLD: 5.74 10*3/MM3 (ref 4.5–10.7)

## 2018-09-18 PROCEDURE — 94799 UNLISTED PULMONARY SVC/PX: CPT

## 2018-09-18 PROCEDURE — 82803 BLOOD GASES ANY COMBINATION: CPT

## 2018-09-18 PROCEDURE — 36600 WITHDRAWAL OF ARTERIAL BLOOD: CPT

## 2018-09-18 PROCEDURE — 82962 GLUCOSE BLOOD TEST: CPT

## 2018-09-18 PROCEDURE — 25010000002 METHYLPREDNISOLONE PER 40 MG: Performed by: INTERNAL MEDICINE

## 2018-09-18 PROCEDURE — 94660 CPAP INITIATION&MGMT: CPT

## 2018-09-18 PROCEDURE — 80048 BASIC METABOLIC PNL TOTAL CA: CPT | Performed by: INTERNAL MEDICINE

## 2018-09-18 PROCEDURE — 85025 COMPLETE CBC W/AUTO DIFF WBC: CPT | Performed by: INTERNAL MEDICINE

## 2018-09-18 PROCEDURE — 25010000002 ENOXAPARIN PER 10 MG: Performed by: INTERNAL MEDICINE

## 2018-09-18 RX ORDER — METHYLPREDNISOLONE SODIUM SUCCINATE 40 MG/ML
40 INJECTION, POWDER, LYOPHILIZED, FOR SOLUTION INTRAMUSCULAR; INTRAVENOUS EVERY 8 HOURS
Status: DISCONTINUED | OUTPATIENT
Start: 2018-09-18 | End: 2018-09-19

## 2018-09-18 RX ORDER — IPRATROPIUM BROMIDE AND ALBUTEROL SULFATE 2.5; .5 MG/3ML; MG/3ML
SOLUTION RESPIRATORY (INHALATION)
Status: COMPLETED | OUTPATIENT
Start: 2018-09-18 | End: 2018-09-18

## 2018-09-18 RX ORDER — ALBUTEROL SULFATE 2.5 MG/3ML
SOLUTION RESPIRATORY (INHALATION)
Status: DISPENSED
Start: 2018-09-18 | End: 2018-09-18

## 2018-09-18 RX ORDER — LORAZEPAM 0.5 MG/1
0.5 TABLET ORAL NIGHTLY PRN
Status: DISCONTINUED | OUTPATIENT
Start: 2018-09-18 | End: 2018-09-19

## 2018-09-18 RX ADMIN — DEXMEDETOMIDINE HYDROCHLORIDE 0.4 MCG/KG/HR: 100 INJECTION, SOLUTION, CONCENTRATE INTRAVENOUS at 08:40

## 2018-09-18 RX ADMIN — METHYLPREDNISOLONE SODIUM SUCCINATE 40 MG: 40 INJECTION, POWDER, LYOPHILIZED, FOR SOLUTION INTRAMUSCULAR; INTRAVENOUS at 06:34

## 2018-09-18 RX ADMIN — BUDESONIDE 0.25 MG: 0.25 INHALANT RESPIRATORY (INHALATION) at 09:35

## 2018-09-18 RX ADMIN — METHYLPREDNISOLONE SODIUM SUCCINATE 40 MG: 40 INJECTION, POWDER, LYOPHILIZED, FOR SOLUTION INTRAMUSCULAR; INTRAVENOUS at 22:20

## 2018-09-18 RX ADMIN — BUDESONIDE 0.25 MG: 0.25 INHALANT RESPIRATORY (INHALATION) at 19:30

## 2018-09-18 RX ADMIN — ARFORMOTEROL TARTRATE 15 MCG: 15 SOLUTION RESPIRATORY (INHALATION) at 19:31

## 2018-09-18 RX ADMIN — ARFORMOTEROL TARTRATE 15 MCG: 15 SOLUTION RESPIRATORY (INHALATION) at 09:35

## 2018-09-18 RX ADMIN — GUAIFENESIN 600 MG: 600 TABLET, EXTENDED RELEASE ORAL at 20:07

## 2018-09-18 RX ADMIN — DEXMEDETOMIDINE HYDROCHLORIDE 0.4 MCG/KG/HR: 100 INJECTION, SOLUTION, CONCENTRATE INTRAVENOUS at 19:41

## 2018-09-18 RX ADMIN — IPRATROPIUM BROMIDE AND ALBUTEROL SULFATE 3 ML: .5; 3 SOLUTION RESPIRATORY (INHALATION) at 01:39

## 2018-09-18 RX ADMIN — SODIUM CHLORIDE 500 ML: 9 INJECTION, SOLUTION INTRAVENOUS at 05:16

## 2018-09-18 RX ADMIN — ENOXAPARIN SODIUM 40 MG: 40 INJECTION SUBCUTANEOUS at 17:34

## 2018-09-18 RX ADMIN — METHYLPREDNISOLONE SODIUM SUCCINATE 40 MG: 40 INJECTION, POWDER, LYOPHILIZED, FOR SOLUTION INTRAMUSCULAR; INTRAVENOUS at 15:11

## 2018-09-18 RX ADMIN — NICOTINE 1 PATCH: 14 PATCH, EXTENDED RELEASE TRANSDERMAL at 16:57

## 2018-09-18 RX ADMIN — GUAIFENESIN 600 MG: 600 TABLET, EXTENDED RELEASE ORAL at 08:25

## 2018-09-18 RX ADMIN — DEXMEDETOMIDINE HYDROCHLORIDE 1.2 MCG/KG/HR: 100 INJECTION, SOLUTION, CONCENTRATE INTRAVENOUS at 02:12

## 2018-09-19 ENCOUNTER — APPOINTMENT (OUTPATIENT)
Dept: GENERAL RADIOLOGY | Facility: HOSPITAL | Age: 58
End: 2018-09-19
Attending: INTERNAL MEDICINE

## 2018-09-19 LAB
ANION GAP SERPL CALCULATED.3IONS-SCNC: 10.7 MMOL/L
BASOPHILS # BLD AUTO: 0 10*3/MM3 (ref 0–0.2)
BASOPHILS NFR BLD AUTO: 0 % (ref 0–1.5)
BUN BLD-MCNC: 19 MG/DL (ref 6–20)
BUN/CREAT SERPL: 33.3 (ref 7–25)
CALCIUM SPEC-SCNC: 9.9 MG/DL (ref 8.6–10.5)
CHLORIDE SERPL-SCNC: 96 MMOL/L (ref 98–107)
CO2 SERPL-SCNC: 35.3 MMOL/L (ref 22–29)
CREAT BLD-MCNC: 0.57 MG/DL (ref 0.76–1.27)
DEPRECATED RDW RBC AUTO: 50.2 FL (ref 37–54)
EOSINOPHIL # BLD AUTO: 0 10*3/MM3 (ref 0–0.7)
EOSINOPHIL NFR BLD AUTO: 0 % (ref 0.3–6.2)
ERYTHROCYTE [DISTWIDTH] IN BLOOD BY AUTOMATED COUNT: 13.9 % (ref 11.5–14.5)
GFR SERPL CREATININE-BSD FRML MDRD: 146 ML/MIN/1.73
GLUCOSE BLD-MCNC: 111 MG/DL (ref 65–99)
HCT VFR BLD AUTO: 48.4 % (ref 40.4–52.2)
HGB BLD-MCNC: 14.6 G/DL (ref 13.7–17.6)
IMM GRANULOCYTES # BLD: 0 10*3/MM3 (ref 0–0.03)
IMM GRANULOCYTES NFR BLD: 0 % (ref 0–0.5)
LYMPHOCYTES # BLD AUTO: 0.94 10*3/MM3 (ref 0.9–4.8)
LYMPHOCYTES NFR BLD AUTO: 11.8 % (ref 19.6–45.3)
MCH RBC QN AUTO: 29.9 PG (ref 27–32.7)
MCHC RBC AUTO-ENTMCNC: 30.2 G/DL (ref 32.6–36.4)
MCV RBC AUTO: 99.2 FL (ref 79.8–96.2)
MONOCYTES # BLD AUTO: 0.32 10*3/MM3 (ref 0.2–1.2)
MONOCYTES NFR BLD AUTO: 4 % (ref 5–12)
NEUTROPHILS # BLD AUTO: 6.73 10*3/MM3 (ref 1.9–8.1)
NEUTROPHILS NFR BLD AUTO: 84.2 % (ref 42.7–76)
PLATELET # BLD AUTO: 252 10*3/MM3 (ref 140–500)
PMV BLD AUTO: 10.9 FL (ref 6–12)
POTASSIUM BLD-SCNC: 5.2 MMOL/L (ref 3.5–5.2)
RBC # BLD AUTO: 4.88 10*6/MM3 (ref 4.6–6)
SODIUM BLD-SCNC: 142 MMOL/L (ref 136–145)
WBC NRBC COR # BLD: 7.99 10*3/MM3 (ref 4.5–10.7)

## 2018-09-19 PROCEDURE — 94799 UNLISTED PULMONARY SVC/PX: CPT

## 2018-09-19 PROCEDURE — 94660 CPAP INITIATION&MGMT: CPT

## 2018-09-19 PROCEDURE — 25010000002 METHYLPREDNISOLONE PER 40 MG: Performed by: INTERNAL MEDICINE

## 2018-09-19 PROCEDURE — 94667 MNPJ CHEST WALL 1ST: CPT

## 2018-09-19 PROCEDURE — 80048 BASIC METABOLIC PNL TOTAL CA: CPT | Performed by: INTERNAL MEDICINE

## 2018-09-19 PROCEDURE — 71045 X-RAY EXAM CHEST 1 VIEW: CPT

## 2018-09-19 PROCEDURE — 63710000001 PREDNISONE PER 1 MG: Performed by: INTERNAL MEDICINE

## 2018-09-19 PROCEDURE — 25010000002 ENOXAPARIN PER 10 MG: Performed by: INTERNAL MEDICINE

## 2018-09-19 PROCEDURE — 85025 COMPLETE CBC W/AUTO DIFF WBC: CPT | Performed by: INTERNAL MEDICINE

## 2018-09-19 RX ORDER — LORAZEPAM 0.5 MG/1
0.5 TABLET ORAL NIGHTLY
Status: DISCONTINUED | OUTPATIENT
Start: 2018-09-19 | End: 2018-09-22

## 2018-09-19 RX ORDER — LORAZEPAM 0.5 MG/1
0.5 TABLET ORAL NIGHTLY PRN
Status: DISCONTINUED | OUTPATIENT
Start: 2018-09-19 | End: 2018-09-22

## 2018-09-19 RX ORDER — PREDNISONE 20 MG/1
40 TABLET ORAL
Status: DISCONTINUED | OUTPATIENT
Start: 2018-09-19 | End: 2018-09-21

## 2018-09-19 RX ORDER — SODIUM CHLORIDE FOR INHALATION 7 %
4 VIAL, NEBULIZER (ML) INHALATION
Status: DISCONTINUED | OUTPATIENT
Start: 2018-09-19 | End: 2018-09-26 | Stop reason: HOSPADM

## 2018-09-19 RX ADMIN — PREDNISONE 40 MG: 20 TABLET ORAL at 11:41

## 2018-09-19 RX ADMIN — ARFORMOTEROL TARTRATE 15 MCG: 15 SOLUTION RESPIRATORY (INHALATION) at 06:57

## 2018-09-19 RX ADMIN — LORAZEPAM 0.5 MG: 0.5 TABLET ORAL at 20:51

## 2018-09-19 RX ADMIN — ACETAMINOPHEN 650 MG: 325 TABLET ORAL at 19:17

## 2018-09-19 RX ADMIN — METHYLPREDNISOLONE SODIUM SUCCINATE 40 MG: 40 INJECTION, POWDER, LYOPHILIZED, FOR SOLUTION INTRAMUSCULAR; INTRAVENOUS at 04:50

## 2018-09-19 RX ADMIN — Medication 4 ML: at 20:41

## 2018-09-19 RX ADMIN — DEXMEDETOMIDINE HYDROCHLORIDE 0.2 MCG/KG/HR: 100 INJECTION, SOLUTION, CONCENTRATE INTRAVENOUS at 12:49

## 2018-09-19 RX ADMIN — ARFORMOTEROL TARTRATE 15 MCG: 15 SOLUTION RESPIRATORY (INHALATION) at 20:41

## 2018-09-19 RX ADMIN — GUAIFENESIN 600 MG: 600 TABLET, EXTENDED RELEASE ORAL at 20:51

## 2018-09-19 RX ADMIN — NICOTINE 1 PATCH: 14 PATCH, EXTENDED RELEASE TRANSDERMAL at 16:53

## 2018-09-19 RX ADMIN — ENOXAPARIN SODIUM 40 MG: 40 INJECTION SUBCUTANEOUS at 16:51

## 2018-09-19 RX ADMIN — BUDESONIDE 0.25 MG: 0.25 INHALANT RESPIRATORY (INHALATION) at 06:57

## 2018-09-19 RX ADMIN — GUAIFENESIN 600 MG: 600 TABLET, EXTENDED RELEASE ORAL at 08:41

## 2018-09-19 RX ADMIN — BUDESONIDE 0.25 MG: 0.25 INHALANT RESPIRATORY (INHALATION) at 20:41

## 2018-09-20 LAB
ANION GAP SERPL CALCULATED.3IONS-SCNC: 7.1 MMOL/L
BASOPHILS # BLD AUTO: 0.01 10*3/MM3 (ref 0–0.2)
BASOPHILS NFR BLD AUTO: 0.1 % (ref 0–1.5)
BUN BLD-MCNC: 18 MG/DL (ref 6–20)
BUN/CREAT SERPL: 32.1 (ref 7–25)
CALCIUM SPEC-SCNC: 9.2 MG/DL (ref 8.6–10.5)
CHLORIDE SERPL-SCNC: 99 MMOL/L (ref 98–107)
CO2 SERPL-SCNC: 37.9 MMOL/L (ref 22–29)
CREAT BLD-MCNC: 0.56 MG/DL (ref 0.76–1.27)
D DIMER PPP FEU-MCNC: <0.27 MCGFEU/ML (ref 0–0.49)
DEPRECATED RDW RBC AUTO: 52 FL (ref 37–54)
EOSINOPHIL # BLD AUTO: 0.03 10*3/MM3 (ref 0–0.7)
EOSINOPHIL NFR BLD AUTO: 0.3 % (ref 0.3–6.2)
ERYTHROCYTE [DISTWIDTH] IN BLOOD BY AUTOMATED COUNT: 14.6 % (ref 11.5–14.5)
GFR SERPL CREATININE-BSD FRML MDRD: 149 ML/MIN/1.73
GLUCOSE BLD-MCNC: 93 MG/DL (ref 65–99)
HCT VFR BLD AUTO: 42 % (ref 40.4–52.2)
HGB BLD-MCNC: 13.3 G/DL (ref 13.7–17.6)
IMM GRANULOCYTES # BLD: 0.02 10*3/MM3 (ref 0–0.03)
IMM GRANULOCYTES NFR BLD: 0.2 % (ref 0–0.5)
LYMPHOCYTES # BLD AUTO: 2.43 10*3/MM3 (ref 0.9–4.8)
LYMPHOCYTES NFR BLD AUTO: 21.7 % (ref 19.6–45.3)
MCH RBC QN AUTO: 30.9 PG (ref 27–32.7)
MCHC RBC AUTO-ENTMCNC: 31.7 G/DL (ref 32.6–36.4)
MCV RBC AUTO: 97.7 FL (ref 79.8–96.2)
MONOCYTES # BLD AUTO: 1.24 10*3/MM3 (ref 0.2–1.2)
MONOCYTES NFR BLD AUTO: 11.1 % (ref 5–12)
NEUTROPHILS # BLD AUTO: 7.47 10*3/MM3 (ref 1.9–8.1)
NEUTROPHILS NFR BLD AUTO: 66.8 % (ref 42.7–76)
NRBC BLD MANUAL-RTO: 0 /100 WBC (ref 0–0)
PLATELET # BLD AUTO: 218 10*3/MM3 (ref 140–500)
PMV BLD AUTO: 10.8 FL (ref 6–12)
POTASSIUM BLD-SCNC: 3.7 MMOL/L (ref 3.5–5.2)
PROCALCITONIN SERPL-MCNC: 0.41 NG/ML (ref 0.1–0.25)
RBC # BLD AUTO: 4.3 10*6/MM3 (ref 4.6–6)
SODIUM BLD-SCNC: 144 MMOL/L (ref 136–145)
TROPONIN T SERPL-MCNC: 0.04 NG/ML (ref 0–0.03)
WBC NRBC COR # BLD: 11.18 10*3/MM3 (ref 4.5–10.7)

## 2018-09-20 PROCEDURE — 85025 COMPLETE CBC W/AUTO DIFF WBC: CPT | Performed by: INTERNAL MEDICINE

## 2018-09-20 PROCEDURE — 85379 FIBRIN DEGRADATION QUANT: CPT | Performed by: INTERNAL MEDICINE

## 2018-09-20 PROCEDURE — 25010000002 VANCOMYCIN 10 G RECONSTITUTED SOLUTION: Performed by: INTERNAL MEDICINE

## 2018-09-20 PROCEDURE — 84145 PROCALCITONIN (PCT): CPT | Performed by: INTERNAL MEDICINE

## 2018-09-20 PROCEDURE — 25010000002 LEVOFLOXACIN PER 250 MG: Performed by: INTERNAL MEDICINE

## 2018-09-20 PROCEDURE — 63710000001 PREDNISONE PER 1 MG: Performed by: INTERNAL MEDICINE

## 2018-09-20 PROCEDURE — 93005 ELECTROCARDIOGRAM TRACING: CPT | Performed by: INTERNAL MEDICINE

## 2018-09-20 PROCEDURE — 94799 UNLISTED PULMONARY SVC/PX: CPT

## 2018-09-20 PROCEDURE — 25010000002 ENOXAPARIN PER 10 MG: Performed by: INTERNAL MEDICINE

## 2018-09-20 PROCEDURE — 94668 MNPJ CHEST WALL SBSQ: CPT

## 2018-09-20 PROCEDURE — 93010 ELECTROCARDIOGRAM REPORT: CPT | Performed by: INTERNAL MEDICINE

## 2018-09-20 PROCEDURE — 80048 BASIC METABOLIC PNL TOTAL CA: CPT | Performed by: INTERNAL MEDICINE

## 2018-09-20 PROCEDURE — 87081 CULTURE SCREEN ONLY: CPT | Performed by: INTERNAL MEDICINE

## 2018-09-20 PROCEDURE — 97161 PT EVAL LOW COMPLEX 20 MIN: CPT

## 2018-09-20 PROCEDURE — 84484 ASSAY OF TROPONIN QUANT: CPT | Performed by: INTERNAL MEDICINE

## 2018-09-20 RX ORDER — LEVOFLOXACIN 5 MG/ML
750 INJECTION, SOLUTION INTRAVENOUS EVERY 24 HOURS
Status: DISCONTINUED | OUTPATIENT
Start: 2018-09-20 | End: 2018-09-22 | Stop reason: ALTCHOICE

## 2018-09-20 RX ADMIN — ACETAMINOPHEN 650 MG: 325 TABLET ORAL at 08:36

## 2018-09-20 RX ADMIN — BUDESONIDE 0.25 MG: 0.25 INHALANT RESPIRATORY (INHALATION) at 21:10

## 2018-09-20 RX ADMIN — VANCOMYCIN HYDROCHLORIDE 1750 MG: 10 INJECTION, POWDER, LYOPHILIZED, FOR SOLUTION INTRAVENOUS at 16:20

## 2018-09-20 RX ADMIN — ARFORMOTEROL TARTRATE 15 MCG: 15 SOLUTION RESPIRATORY (INHALATION) at 08:13

## 2018-09-20 RX ADMIN — NICOTINE 1 PATCH: 14 PATCH, EXTENDED RELEASE TRANSDERMAL at 16:18

## 2018-09-20 RX ADMIN — ACETAMINOPHEN 650 MG: 325 TABLET ORAL at 16:18

## 2018-09-20 RX ADMIN — Medication 4 ML: at 21:11

## 2018-09-20 RX ADMIN — LEVOFLOXACIN 750 MG: 5 INJECTION, SOLUTION INTRAVENOUS at 20:47

## 2018-09-20 RX ADMIN — ARFORMOTEROL TARTRATE 15 MCG: 15 SOLUTION RESPIRATORY (INHALATION) at 21:11

## 2018-09-20 RX ADMIN — GUAIFENESIN 600 MG: 600 TABLET, EXTENDED RELEASE ORAL at 08:35

## 2018-09-20 RX ADMIN — ENOXAPARIN SODIUM 40 MG: 40 INJECTION SUBCUTANEOUS at 16:17

## 2018-09-20 RX ADMIN — PREDNISONE 40 MG: 20 TABLET ORAL at 08:35

## 2018-09-20 RX ADMIN — Medication 4 ML: at 08:13

## 2018-09-20 RX ADMIN — LORAZEPAM 0.5 MG: 0.5 TABLET ORAL at 20:45

## 2018-09-20 RX ADMIN — GUAIFENESIN 600 MG: 600 TABLET, EXTENDED RELEASE ORAL at 20:45

## 2018-09-21 LAB
ANION GAP SERPL CALCULATED.3IONS-SCNC: 4.6 MMOL/L
ARTERIAL PATENCY WRIST A: ABNORMAL
ATMOSPHERIC PRESS: 754.1 MMHG
BASE EXCESS BLDA CALC-SCNC: 10.7 MMOL/L (ref 0–2)
BASOPHILS # BLD AUTO: 0.01 10*3/MM3 (ref 0–0.2)
BASOPHILS NFR BLD AUTO: 0.1 % (ref 0–1.5)
BDY SITE: ABNORMAL
BUN BLD-MCNC: 13 MG/DL (ref 6–20)
BUN/CREAT SERPL: 22.4 (ref 7–25)
CALCIUM SPEC-SCNC: 8.7 MG/DL (ref 8.6–10.5)
CHLORIDE SERPL-SCNC: 98 MMOL/L (ref 98–107)
CO2 SERPL-SCNC: 39.4 MMOL/L (ref 22–29)
CREAT BLD-MCNC: 0.58 MG/DL (ref 0.76–1.27)
DEPRECATED RDW RBC AUTO: 52.5 FL (ref 37–54)
EOSINOPHIL # BLD AUTO: 0.05 10*3/MM3 (ref 0–0.7)
EOSINOPHIL NFR BLD AUTO: 0.5 % (ref 0.3–6.2)
ERYTHROCYTE [DISTWIDTH] IN BLOOD BY AUTOMATED COUNT: 14.3 % (ref 11.5–14.5)
GAS FLOW AIRWAY: 8 LPM
GFR SERPL CREATININE-BSD FRML MDRD: 143 ML/MIN/1.73
GLUCOSE BLD-MCNC: 92 MG/DL (ref 65–99)
HCO3 BLDA-SCNC: 40.6 MMOL/L (ref 22–28)
HCT VFR BLD AUTO: 41.3 % (ref 40.4–52.2)
HGB BLD-MCNC: 12.7 G/DL (ref 13.7–17.6)
IMM GRANULOCYTES # BLD: 0.01 10*3/MM3 (ref 0–0.03)
IMM GRANULOCYTES NFR BLD: 0.1 % (ref 0–0.5)
LYMPHOCYTES # BLD AUTO: 2.37 10*3/MM3 (ref 0.9–4.8)
LYMPHOCYTES NFR BLD AUTO: 24.6 % (ref 19.6–45.3)
MCH RBC QN AUTO: 30.5 PG (ref 27–32.7)
MCHC RBC AUTO-ENTMCNC: 30.8 G/DL (ref 32.6–36.4)
MCV RBC AUTO: 99.3 FL (ref 79.8–96.2)
MODALITY: ABNORMAL
MONOCYTES # BLD AUTO: 1.28 10*3/MM3 (ref 0.2–1.2)
MONOCYTES NFR BLD AUTO: 13.3 % (ref 5–12)
NEUTROPHILS # BLD AUTO: 5.93 10*3/MM3 (ref 1.9–8.1)
NEUTROPHILS NFR BLD AUTO: 61.5 % (ref 42.7–76)
PCO2 BLDA: 78.1 MM HG (ref 35–45)
PH BLDA: 7.32 PH UNITS (ref 7.35–7.45)
PLATELET # BLD AUTO: 231 10*3/MM3 (ref 140–500)
PMV BLD AUTO: 10.3 FL (ref 6–12)
PO2 BLDA: 68.5 MM HG (ref 80–100)
POTASSIUM BLD-SCNC: 3.8 MMOL/L (ref 3.5–5.2)
RBC # BLD AUTO: 4.16 10*6/MM3 (ref 4.6–6)
SAO2 % BLDCOA: 90.6 % (ref 92–99)
SODIUM BLD-SCNC: 142 MMOL/L (ref 136–145)
TOTAL RATE: 21 BREATHS/MINUTE
WBC NRBC COR # BLD: 9.64 10*3/MM3 (ref 4.5–10.7)

## 2018-09-21 PROCEDURE — 97110 THERAPEUTIC EXERCISES: CPT

## 2018-09-21 PROCEDURE — 36600 WITHDRAWAL OF ARTERIAL BLOOD: CPT

## 2018-09-21 PROCEDURE — 94799 UNLISTED PULMONARY SVC/PX: CPT

## 2018-09-21 PROCEDURE — 25010000002 LEVOFLOXACIN PER 250 MG: Performed by: INTERNAL MEDICINE

## 2018-09-21 PROCEDURE — 85025 COMPLETE CBC W/AUTO DIFF WBC: CPT | Performed by: INTERNAL MEDICINE

## 2018-09-21 PROCEDURE — 25010000002 ENOXAPARIN PER 10 MG: Performed by: INTERNAL MEDICINE

## 2018-09-21 PROCEDURE — 82803 BLOOD GASES ANY COMBINATION: CPT

## 2018-09-21 PROCEDURE — 94660 CPAP INITIATION&MGMT: CPT

## 2018-09-21 PROCEDURE — 80048 BASIC METABOLIC PNL TOTAL CA: CPT | Performed by: INTERNAL MEDICINE

## 2018-09-21 PROCEDURE — 25010000002 VANCOMYCIN 10 G RECONSTITUTED SOLUTION: Performed by: INTERNAL MEDICINE

## 2018-09-21 PROCEDURE — 63710000001 PREDNISONE PER 1 MG: Performed by: INTERNAL MEDICINE

## 2018-09-21 PROCEDURE — 94668 MNPJ CHEST WALL SBSQ: CPT

## 2018-09-21 RX ORDER — PREDNISONE 20 MG/1
20 TABLET ORAL
Status: DISCONTINUED | OUTPATIENT
Start: 2018-09-22 | End: 2018-09-24

## 2018-09-21 RX ADMIN — Medication 4 ML: at 19:58

## 2018-09-21 RX ADMIN — LEVOFLOXACIN 750 MG: 5 INJECTION, SOLUTION INTRAVENOUS at 17:20

## 2018-09-21 RX ADMIN — Medication 4 ML: at 07:45

## 2018-09-21 RX ADMIN — VANCOMYCIN HYDROCHLORIDE 1750 MG: 10 INJECTION, POWDER, LYOPHILIZED, FOR SOLUTION INTRAVENOUS at 05:20

## 2018-09-21 RX ADMIN — GUAIFENESIN 600 MG: 600 TABLET, EXTENDED RELEASE ORAL at 20:29

## 2018-09-21 RX ADMIN — BUDESONIDE 0.25 MG: 0.25 INHALANT RESPIRATORY (INHALATION) at 19:58

## 2018-09-21 RX ADMIN — ARFORMOTEROL TARTRATE 15 MCG: 15 SOLUTION RESPIRATORY (INHALATION) at 19:58

## 2018-09-21 RX ADMIN — ARFORMOTEROL TARTRATE 15 MCG: 15 SOLUTION RESPIRATORY (INHALATION) at 07:45

## 2018-09-21 RX ADMIN — ENOXAPARIN SODIUM 40 MG: 40 INJECTION SUBCUTANEOUS at 17:19

## 2018-09-21 RX ADMIN — VANCOMYCIN HYDROCHLORIDE 1750 MG: 10 INJECTION, POWDER, LYOPHILIZED, FOR SOLUTION INTRAVENOUS at 17:19

## 2018-09-21 RX ADMIN — LORAZEPAM 0.5 MG: 0.5 TABLET ORAL at 20:29

## 2018-09-21 RX ADMIN — NICOTINE 1 PATCH: 14 PATCH, EXTENDED RELEASE TRANSDERMAL at 17:20

## 2018-09-21 RX ADMIN — GUAIFENESIN 600 MG: 600 TABLET, EXTENDED RELEASE ORAL at 08:26

## 2018-09-21 RX ADMIN — BUDESONIDE 0.25 MG: 0.25 INHALANT RESPIRATORY (INHALATION) at 07:44

## 2018-09-21 RX ADMIN — PREDNISONE 40 MG: 20 TABLET ORAL at 08:26

## 2018-09-22 LAB
ANION GAP SERPL CALCULATED.3IONS-SCNC: 6.3 MMOL/L
BASOPHILS # BLD AUTO: 0.01 10*3/MM3 (ref 0–0.2)
BASOPHILS NFR BLD AUTO: 0.1 % (ref 0–1.5)
BUN BLD-MCNC: 14 MG/DL (ref 6–20)
BUN/CREAT SERPL: 23.7 (ref 7–25)
CALCIUM SPEC-SCNC: 8.8 MG/DL (ref 8.6–10.5)
CHLORIDE SERPL-SCNC: 100 MMOL/L (ref 98–107)
CO2 SERPL-SCNC: 38.7 MMOL/L (ref 22–29)
CREAT BLD-MCNC: 0.59 MG/DL (ref 0.76–1.27)
DEPRECATED RDW RBC AUTO: 51.4 FL (ref 37–54)
EOSINOPHIL # BLD AUTO: 0.06 10*3/MM3 (ref 0–0.7)
EOSINOPHIL NFR BLD AUTO: 0.7 % (ref 0.3–6.2)
ERYTHROCYTE [DISTWIDTH] IN BLOOD BY AUTOMATED COUNT: 14.2 % (ref 11.5–14.5)
GFR SERPL CREATININE-BSD FRML MDRD: 141 ML/MIN/1.73
GLUCOSE BLD-MCNC: 91 MG/DL (ref 65–99)
HCT VFR BLD AUTO: 41.6 % (ref 40.4–52.2)
HGB BLD-MCNC: 12.8 G/DL (ref 13.7–17.6)
IMM GRANULOCYTES # BLD: 0.02 10*3/MM3 (ref 0–0.03)
IMM GRANULOCYTES NFR BLD: 0.2 % (ref 0–0.5)
LYMPHOCYTES # BLD AUTO: 2.01 10*3/MM3 (ref 0.9–4.8)
LYMPHOCYTES NFR BLD AUTO: 24.1 % (ref 19.6–45.3)
MCH RBC QN AUTO: 30.5 PG (ref 27–32.7)
MCHC RBC AUTO-ENTMCNC: 30.8 G/DL (ref 32.6–36.4)
MCV RBC AUTO: 99 FL (ref 79.8–96.2)
MONOCYTES # BLD AUTO: 1.16 10*3/MM3 (ref 0.2–1.2)
MONOCYTES NFR BLD AUTO: 13.9 % (ref 5–12)
MRSA SPEC QL CULT: NORMAL
NEUTROPHILS # BLD AUTO: 5.09 10*3/MM3 (ref 1.9–8.1)
NEUTROPHILS NFR BLD AUTO: 61.2 % (ref 42.7–76)
NRBC BLD MANUAL-RTO: 0 /100 WBC (ref 0–0)
PLATELET # BLD AUTO: 223 10*3/MM3 (ref 140–500)
PMV BLD AUTO: 10.1 FL (ref 6–12)
POTASSIUM BLD-SCNC: 4 MMOL/L (ref 3.5–5.2)
RBC # BLD AUTO: 4.2 10*6/MM3 (ref 4.6–6)
SODIUM BLD-SCNC: 145 MMOL/L (ref 136–145)
VANCOMYCIN TROUGH SERPL-MCNC: 13.5 MCG/ML (ref 5–20)
WBC NRBC COR # BLD: 8.33 10*3/MM3 (ref 4.5–10.7)

## 2018-09-22 PROCEDURE — 94799 UNLISTED PULMONARY SVC/PX: CPT

## 2018-09-22 PROCEDURE — 25010000002 ENOXAPARIN PER 10 MG: Performed by: INTERNAL MEDICINE

## 2018-09-22 PROCEDURE — 94660 CPAP INITIATION&MGMT: CPT

## 2018-09-22 PROCEDURE — 25010000002 VANCOMYCIN PER 500 MG: Performed by: INTERNAL MEDICINE

## 2018-09-22 PROCEDURE — 85025 COMPLETE CBC W/AUTO DIFF WBC: CPT | Performed by: INTERNAL MEDICINE

## 2018-09-22 PROCEDURE — 94668 MNPJ CHEST WALL SBSQ: CPT

## 2018-09-22 PROCEDURE — 97110 THERAPEUTIC EXERCISES: CPT | Performed by: PHYSICAL THERAPIST

## 2018-09-22 PROCEDURE — 63710000001 PREDNISONE PER 1 MG: Performed by: INTERNAL MEDICINE

## 2018-09-22 PROCEDURE — 80202 ASSAY OF VANCOMYCIN: CPT | Performed by: INTERNAL MEDICINE

## 2018-09-22 PROCEDURE — 80048 BASIC METABOLIC PNL TOTAL CA: CPT | Performed by: INTERNAL MEDICINE

## 2018-09-22 RX ORDER — LORAZEPAM 0.5 MG/1
0.25 TABLET ORAL NIGHTLY
Status: DISCONTINUED | OUTPATIENT
Start: 2018-09-22 | End: 2018-09-26 | Stop reason: HOSPADM

## 2018-09-22 RX ORDER — FUROSEMIDE 40 MG/1
40 TABLET ORAL DAILY
Status: DISCONTINUED | OUTPATIENT
Start: 2018-09-22 | End: 2018-09-26 | Stop reason: HOSPADM

## 2018-09-22 RX ORDER — LEVOFLOXACIN 750 MG/1
750 TABLET ORAL EVERY 24 HOURS
Status: COMPLETED | OUTPATIENT
Start: 2018-09-22 | End: 2018-09-26

## 2018-09-22 RX ADMIN — BUDESONIDE 0.25 MG: 0.25 INHALANT RESPIRATORY (INHALATION) at 19:33

## 2018-09-22 RX ADMIN — FUROSEMIDE 40 MG: 40 TABLET ORAL at 20:18

## 2018-09-22 RX ADMIN — ARFORMOTEROL TARTRATE 15 MCG: 15 SOLUTION RESPIRATORY (INHALATION) at 19:33

## 2018-09-22 RX ADMIN — GUAIFENESIN 600 MG: 600 TABLET, EXTENDED RELEASE ORAL at 08:26

## 2018-09-22 RX ADMIN — LORAZEPAM 0.25 MG: 0.5 TABLET ORAL at 20:18

## 2018-09-22 RX ADMIN — ENOXAPARIN SODIUM 40 MG: 40 INJECTION SUBCUTANEOUS at 17:18

## 2018-09-22 RX ADMIN — ARFORMOTEROL TARTRATE 15 MCG: 15 SOLUTION RESPIRATORY (INHALATION) at 07:15

## 2018-09-22 RX ADMIN — BUDESONIDE 0.25 MG: 0.25 INHALANT RESPIRATORY (INHALATION) at 07:15

## 2018-09-22 RX ADMIN — Medication 4 ML: at 07:21

## 2018-09-22 RX ADMIN — NICOTINE 1 PATCH: 14 PATCH, EXTENDED RELEASE TRANSDERMAL at 17:20

## 2018-09-22 RX ADMIN — PREDNISONE 20 MG: 20 TABLET ORAL at 08:26

## 2018-09-22 RX ADMIN — IPRATROPIUM BROMIDE 0.5 MG: 0.5 SOLUTION RESPIRATORY (INHALATION) at 23:14

## 2018-09-22 RX ADMIN — LEVOFLOXACIN 750 MG: 750 TABLET, FILM COATED ORAL at 17:18

## 2018-09-22 RX ADMIN — VANCOMYCIN HYDROCHLORIDE 2000 MG: 5 INJECTION, POWDER, LYOPHILIZED, FOR SOLUTION INTRAVENOUS at 06:37

## 2018-09-22 RX ADMIN — GUAIFENESIN 600 MG: 600 TABLET, EXTENDED RELEASE ORAL at 20:18

## 2018-09-23 ENCOUNTER — APPOINTMENT (OUTPATIENT)
Dept: GENERAL RADIOLOGY | Facility: HOSPITAL | Age: 58
End: 2018-09-23

## 2018-09-23 LAB
ANION GAP SERPL CALCULATED.3IONS-SCNC: 10.8 MMOL/L
BASOPHILS # BLD AUTO: 0.02 10*3/MM3 (ref 0–0.2)
BASOPHILS NFR BLD AUTO: 0.2 % (ref 0–1.5)
BUN BLD-MCNC: 17 MG/DL (ref 6–20)
BUN/CREAT SERPL: 26.2 (ref 7–25)
CALCIUM SPEC-SCNC: 9.2 MG/DL (ref 8.6–10.5)
CHLORIDE SERPL-SCNC: 94 MMOL/L (ref 98–107)
CO2 SERPL-SCNC: 39.2 MMOL/L (ref 22–29)
CREAT BLD-MCNC: 0.65 MG/DL (ref 0.76–1.27)
DEPRECATED RDW RBC AUTO: 50.8 FL (ref 37–54)
EOSINOPHIL # BLD AUTO: 0.15 10*3/MM3 (ref 0–0.7)
EOSINOPHIL NFR BLD AUTO: 1.6 % (ref 0.3–6.2)
ERYTHROCYTE [DISTWIDTH] IN BLOOD BY AUTOMATED COUNT: 13.9 % (ref 11.5–14.5)
GFR SERPL CREATININE-BSD FRML MDRD: 126 ML/MIN/1.73
GLUCOSE BLD-MCNC: 109 MG/DL (ref 65–99)
HCT VFR BLD AUTO: 45.4 % (ref 40.4–52.2)
HGB BLD-MCNC: 13.5 G/DL (ref 13.7–17.6)
IMM GRANULOCYTES # BLD: 0.02 10*3/MM3 (ref 0–0.03)
IMM GRANULOCYTES NFR BLD: 0.2 % (ref 0–0.5)
LYMPHOCYTES # BLD AUTO: 2.31 10*3/MM3 (ref 0.9–4.8)
LYMPHOCYTES NFR BLD AUTO: 24.7 % (ref 19.6–45.3)
MCH RBC QN AUTO: 29.7 PG (ref 27–32.7)
MCHC RBC AUTO-ENTMCNC: 29.7 G/DL (ref 32.6–36.4)
MCV RBC AUTO: 99.8 FL (ref 79.8–96.2)
MONOCYTES # BLD AUTO: 0.98 10*3/MM3 (ref 0.2–1.2)
MONOCYTES NFR BLD AUTO: 10.5 % (ref 5–12)
NEUTROPHILS # BLD AUTO: 5.88 10*3/MM3 (ref 1.9–8.1)
NEUTROPHILS NFR BLD AUTO: 62.8 % (ref 42.7–76)
PLATELET # BLD AUTO: 248 10*3/MM3 (ref 140–500)
PMV BLD AUTO: 10.5 FL (ref 6–12)
POTASSIUM BLD-SCNC: 3.7 MMOL/L (ref 3.5–5.2)
RBC # BLD AUTO: 4.55 10*6/MM3 (ref 4.6–6)
SODIUM BLD-SCNC: 144 MMOL/L (ref 136–145)
VANCOMYCIN TROUGH SERPL-MCNC: 4.4 MCG/ML (ref 5–20)
WBC NRBC COR # BLD: 9.36 10*3/MM3 (ref 4.5–10.7)

## 2018-09-23 PROCEDURE — 94799 UNLISTED PULMONARY SVC/PX: CPT

## 2018-09-23 PROCEDURE — 97110 THERAPEUTIC EXERCISES: CPT

## 2018-09-23 PROCEDURE — 94668 MNPJ CHEST WALL SBSQ: CPT

## 2018-09-23 PROCEDURE — 94669 MECHANICAL CHEST WALL OSCILL: CPT

## 2018-09-23 PROCEDURE — 85025 COMPLETE CBC W/AUTO DIFF WBC: CPT | Performed by: INTERNAL MEDICINE

## 2018-09-23 PROCEDURE — 80202 ASSAY OF VANCOMYCIN: CPT | Performed by: INTERNAL MEDICINE

## 2018-09-23 PROCEDURE — 80048 BASIC METABOLIC PNL TOTAL CA: CPT | Performed by: INTERNAL MEDICINE

## 2018-09-23 PROCEDURE — 71045 X-RAY EXAM CHEST 1 VIEW: CPT

## 2018-09-23 PROCEDURE — 25010000002 ENOXAPARIN PER 10 MG: Performed by: INTERNAL MEDICINE

## 2018-09-23 PROCEDURE — 63710000001 PREDNISONE PER 1 MG: Performed by: INTERNAL MEDICINE

## 2018-09-23 RX ADMIN — GUAIFENESIN 600 MG: 600 TABLET, EXTENDED RELEASE ORAL at 08:37

## 2018-09-23 RX ADMIN — IPRATROPIUM BROMIDE 0.5 MG: 0.5 SOLUTION RESPIRATORY (INHALATION) at 15:53

## 2018-09-23 RX ADMIN — ARFORMOTEROL TARTRATE 15 MCG: 15 SOLUTION RESPIRATORY (INHALATION) at 09:00

## 2018-09-23 RX ADMIN — ARFORMOTEROL TARTRATE 15 MCG: 15 SOLUTION RESPIRATORY (INHALATION) at 19:27

## 2018-09-23 RX ADMIN — NICOTINE 1 PATCH: 14 PATCH, EXTENDED RELEASE TRANSDERMAL at 16:57

## 2018-09-23 RX ADMIN — ENOXAPARIN SODIUM 40 MG: 40 INJECTION SUBCUTANEOUS at 16:57

## 2018-09-23 RX ADMIN — LEVOFLOXACIN 750 MG: 750 TABLET, FILM COATED ORAL at 16:57

## 2018-09-23 RX ADMIN — PREDNISONE 20 MG: 20 TABLET ORAL at 08:36

## 2018-09-23 RX ADMIN — FUROSEMIDE 40 MG: 40 TABLET ORAL at 08:37

## 2018-09-23 RX ADMIN — BUDESONIDE 0.25 MG: 0.25 INHALANT RESPIRATORY (INHALATION) at 09:00

## 2018-09-23 RX ADMIN — Medication 4 ML: at 09:00

## 2018-09-23 RX ADMIN — BUDESONIDE 0.25 MG: 0.25 INHALANT RESPIRATORY (INHALATION) at 19:27

## 2018-09-23 RX ADMIN — Medication 4 ML: at 19:36

## 2018-09-23 RX ADMIN — LORAZEPAM 0.25 MG: 0.5 TABLET ORAL at 20:43

## 2018-09-23 RX ADMIN — GUAIFENESIN 600 MG: 600 TABLET, EXTENDED RELEASE ORAL at 20:43

## 2018-09-23 RX ADMIN — IPRATROPIUM BROMIDE 0.5 MG: 0.5 SOLUTION RESPIRATORY (INHALATION) at 21:10

## 2018-09-23 RX ADMIN — IPRATROPIUM BROMIDE 0.5 MG: 0.5 SOLUTION RESPIRATORY (INHALATION) at 12:44

## 2018-09-23 RX ADMIN — IPRATROPIUM BROMIDE 0.5 MG: 0.5 SOLUTION RESPIRATORY (INHALATION) at 09:00

## 2018-09-24 LAB
ANION GAP SERPL CALCULATED.3IONS-SCNC: 8.9 MMOL/L
BASOPHILS # BLD AUTO: 0.01 10*3/MM3 (ref 0–0.2)
BASOPHILS NFR BLD AUTO: 0.1 % (ref 0–1.5)
BUN BLD-MCNC: 20 MG/DL (ref 6–20)
BUN/CREAT SERPL: 34.5 (ref 7–25)
CALCIUM SPEC-SCNC: 8.9 MG/DL (ref 8.6–10.5)
CHLORIDE SERPL-SCNC: 94 MMOL/L (ref 98–107)
CO2 SERPL-SCNC: 39.1 MMOL/L (ref 22–29)
CREAT BLD-MCNC: 0.58 MG/DL (ref 0.76–1.27)
DEPRECATED RDW RBC AUTO: 51 FL (ref 37–54)
EOSINOPHIL # BLD AUTO: 0.22 10*3/MM3 (ref 0–0.7)
EOSINOPHIL NFR BLD AUTO: 2.3 % (ref 0.3–6.2)
ERYTHROCYTE [DISTWIDTH] IN BLOOD BY AUTOMATED COUNT: 14.1 % (ref 11.5–14.5)
GFR SERPL CREATININE-BSD FRML MDRD: 143 ML/MIN/1.73
GLUCOSE BLD-MCNC: 121 MG/DL (ref 65–99)
HCT VFR BLD AUTO: 42.2 % (ref 40.4–52.2)
HGB BLD-MCNC: 13.3 G/DL (ref 13.7–17.6)
IMM GRANULOCYTES # BLD: 0.02 10*3/MM3 (ref 0–0.03)
IMM GRANULOCYTES NFR BLD: 0.2 % (ref 0–0.5)
LYMPHOCYTES # BLD AUTO: 2.74 10*3/MM3 (ref 0.9–4.8)
LYMPHOCYTES NFR BLD AUTO: 29 % (ref 19.6–45.3)
MCH RBC QN AUTO: 30.9 PG (ref 27–32.7)
MCHC RBC AUTO-ENTMCNC: 31.5 G/DL (ref 32.6–36.4)
MCV RBC AUTO: 97.9 FL (ref 79.8–96.2)
MONOCYTES # BLD AUTO: 0.86 10*3/MM3 (ref 0.2–1.2)
MONOCYTES NFR BLD AUTO: 9.1 % (ref 5–12)
NEUTROPHILS # BLD AUTO: 5.61 10*3/MM3 (ref 1.9–8.1)
NEUTROPHILS NFR BLD AUTO: 59.5 % (ref 42.7–76)
PLATELET # BLD AUTO: 245 10*3/MM3 (ref 140–500)
PMV BLD AUTO: 9.8 FL (ref 6–12)
POTASSIUM BLD-SCNC: 3.8 MMOL/L (ref 3.5–5.2)
RBC # BLD AUTO: 4.31 10*6/MM3 (ref 4.6–6)
SODIUM BLD-SCNC: 142 MMOL/L (ref 136–145)
WBC NRBC COR # BLD: 9.44 10*3/MM3 (ref 4.5–10.7)

## 2018-09-24 PROCEDURE — 94799 UNLISTED PULMONARY SVC/PX: CPT

## 2018-09-24 PROCEDURE — 97110 THERAPEUTIC EXERCISES: CPT

## 2018-09-24 PROCEDURE — 94660 CPAP INITIATION&MGMT: CPT

## 2018-09-24 PROCEDURE — 94668 MNPJ CHEST WALL SBSQ: CPT

## 2018-09-24 PROCEDURE — 94669 MECHANICAL CHEST WALL OSCILL: CPT

## 2018-09-24 PROCEDURE — 80048 BASIC METABOLIC PNL TOTAL CA: CPT | Performed by: INTERNAL MEDICINE

## 2018-09-24 PROCEDURE — 85025 COMPLETE CBC W/AUTO DIFF WBC: CPT | Performed by: INTERNAL MEDICINE

## 2018-09-24 PROCEDURE — 94760 N-INVAS EAR/PLS OXIMETRY 1: CPT

## 2018-09-24 PROCEDURE — 25010000002 ENOXAPARIN PER 10 MG: Performed by: INTERNAL MEDICINE

## 2018-09-24 PROCEDURE — 63710000001 PREDNISONE PER 1 MG: Performed by: INTERNAL MEDICINE

## 2018-09-24 RX ORDER — PREDNISONE 10 MG/1
10 TABLET ORAL
Status: DISCONTINUED | OUTPATIENT
Start: 2018-09-25 | End: 2018-09-25

## 2018-09-24 RX ADMIN — IPRATROPIUM BROMIDE 0.5 MG: 0.5 SOLUTION RESPIRATORY (INHALATION) at 07:00

## 2018-09-24 RX ADMIN — GUAIFENESIN 600 MG: 600 TABLET, EXTENDED RELEASE ORAL at 08:07

## 2018-09-24 RX ADMIN — NICOTINE 1 PATCH: 14 PATCH, EXTENDED RELEASE TRANSDERMAL at 17:28

## 2018-09-24 RX ADMIN — ENOXAPARIN SODIUM 40 MG: 40 INJECTION SUBCUTANEOUS at 17:28

## 2018-09-24 RX ADMIN — LORAZEPAM 0.25 MG: 0.5 TABLET ORAL at 20:30

## 2018-09-24 RX ADMIN — ARFORMOTEROL TARTRATE 15 MCG: 15 SOLUTION RESPIRATORY (INHALATION) at 05:51

## 2018-09-24 RX ADMIN — IPRATROPIUM BROMIDE 0.5 MG: 0.5 SOLUTION RESPIRATORY (INHALATION) at 15:27

## 2018-09-24 RX ADMIN — ARFORMOTEROL TARTRATE 15 MCG: 15 SOLUTION RESPIRATORY (INHALATION) at 22:41

## 2018-09-24 RX ADMIN — GUAIFENESIN 600 MG: 600 TABLET, EXTENDED RELEASE ORAL at 20:30

## 2018-09-24 RX ADMIN — Medication 4 ML: at 07:00

## 2018-09-24 RX ADMIN — FUROSEMIDE 40 MG: 40 TABLET ORAL at 08:07

## 2018-09-24 RX ADMIN — BUDESONIDE 0.25 MG: 0.25 INHALANT RESPIRATORY (INHALATION) at 22:41

## 2018-09-24 RX ADMIN — LEVOFLOXACIN 750 MG: 750 TABLET, FILM COATED ORAL at 17:28

## 2018-09-24 RX ADMIN — BUDESONIDE 0.25 MG: 0.25 INHALANT RESPIRATORY (INHALATION) at 05:51

## 2018-09-24 RX ADMIN — IPRATROPIUM BROMIDE 0.5 MG: 0.5 SOLUTION RESPIRATORY (INHALATION) at 11:11

## 2018-09-24 RX ADMIN — PREDNISONE 20 MG: 20 TABLET ORAL at 08:07

## 2018-09-25 LAB
ANION GAP SERPL CALCULATED.3IONS-SCNC: 7.4 MMOL/L
BASOPHILS # BLD AUTO: 0.02 10*3/MM3 (ref 0–0.2)
BASOPHILS NFR BLD AUTO: 0.2 % (ref 0–1.5)
BUN BLD-MCNC: 18 MG/DL (ref 6–20)
BUN/CREAT SERPL: 26.9 (ref 7–25)
CALCIUM SPEC-SCNC: 9.3 MG/DL (ref 8.6–10.5)
CHLORIDE SERPL-SCNC: 95 MMOL/L (ref 98–107)
CO2 SERPL-SCNC: 41.6 MMOL/L (ref 22–29)
CREAT BLD-MCNC: 0.67 MG/DL (ref 0.76–1.27)
DEPRECATED RDW RBC AUTO: 51.2 FL (ref 37–54)
EOSINOPHIL # BLD AUTO: 0.18 10*3/MM3 (ref 0–0.7)
EOSINOPHIL NFR BLD AUTO: 2 % (ref 0.3–6.2)
ERYTHROCYTE [DISTWIDTH] IN BLOOD BY AUTOMATED COUNT: 14.1 % (ref 11.5–14.5)
GFR SERPL CREATININE-BSD FRML MDRD: 121 ML/MIN/1.73
GLUCOSE BLD-MCNC: 100 MG/DL (ref 65–99)
HCT VFR BLD AUTO: 44.9 % (ref 40.4–52.2)
HGB BLD-MCNC: 14 G/DL (ref 13.7–17.6)
IMM GRANULOCYTES # BLD: 0.04 10*3/MM3 (ref 0–0.03)
IMM GRANULOCYTES NFR BLD: 0.4 % (ref 0–0.5)
LYMPHOCYTES # BLD AUTO: 2.5 10*3/MM3 (ref 0.9–4.8)
LYMPHOCYTES NFR BLD AUTO: 27.4 % (ref 19.6–45.3)
MCH RBC QN AUTO: 30.8 PG (ref 27–32.7)
MCHC RBC AUTO-ENTMCNC: 31.2 G/DL (ref 32.6–36.4)
MCV RBC AUTO: 98.9 FL (ref 79.8–96.2)
MONOCYTES # BLD AUTO: 0.81 10*3/MM3 (ref 0.2–1.2)
MONOCYTES NFR BLD AUTO: 8.9 % (ref 5–12)
NEUTROPHILS # BLD AUTO: 5.61 10*3/MM3 (ref 1.9–8.1)
NEUTROPHILS NFR BLD AUTO: 61.5 % (ref 42.7–76)
PLATELET # BLD AUTO: 265 10*3/MM3 (ref 140–500)
PMV BLD AUTO: 10 FL (ref 6–12)
POTASSIUM BLD-SCNC: 4.4 MMOL/L (ref 3.5–5.2)
RBC # BLD AUTO: 4.54 10*6/MM3 (ref 4.6–6)
SODIUM BLD-SCNC: 144 MMOL/L (ref 136–145)
WBC NRBC COR # BLD: 9.12 10*3/MM3 (ref 4.5–10.7)

## 2018-09-25 PROCEDURE — 94799 UNLISTED PULMONARY SVC/PX: CPT

## 2018-09-25 PROCEDURE — 85025 COMPLETE CBC W/AUTO DIFF WBC: CPT | Performed by: INTERNAL MEDICINE

## 2018-09-25 PROCEDURE — 25010000002 ENOXAPARIN PER 10 MG: Performed by: INTERNAL MEDICINE

## 2018-09-25 PROCEDURE — 63710000001 PREDNISONE PER 5 MG: Performed by: INTERNAL MEDICINE

## 2018-09-25 PROCEDURE — 97110 THERAPEUTIC EXERCISES: CPT

## 2018-09-25 PROCEDURE — 80048 BASIC METABOLIC PNL TOTAL CA: CPT | Performed by: INTERNAL MEDICINE

## 2018-09-25 PROCEDURE — 94669 MECHANICAL CHEST WALL OSCILL: CPT

## 2018-09-25 RX ADMIN — BUDESONIDE 0.25 MG: 0.25 INHALANT RESPIRATORY (INHALATION) at 08:35

## 2018-09-25 RX ADMIN — IPRATROPIUM BROMIDE 0.5 MG: 0.5 SOLUTION RESPIRATORY (INHALATION) at 15:20

## 2018-09-25 RX ADMIN — LEVOFLOXACIN 750 MG: 750 TABLET, FILM COATED ORAL at 18:23

## 2018-09-25 RX ADMIN — ENOXAPARIN SODIUM 40 MG: 40 INJECTION SUBCUTANEOUS at 18:22

## 2018-09-25 RX ADMIN — IPRATROPIUM BROMIDE 0.5 MG: 0.5 SOLUTION RESPIRATORY (INHALATION) at 20:21

## 2018-09-25 RX ADMIN — LORAZEPAM 0.25 MG: 0.5 TABLET ORAL at 20:09

## 2018-09-25 RX ADMIN — Medication 4 ML: at 20:26

## 2018-09-25 RX ADMIN — GUAIFENESIN 600 MG: 600 TABLET, EXTENDED RELEASE ORAL at 20:09

## 2018-09-25 RX ADMIN — FUROSEMIDE 40 MG: 40 TABLET ORAL at 08:30

## 2018-09-25 RX ADMIN — ARFORMOTEROL TARTRATE 15 MCG: 15 SOLUTION RESPIRATORY (INHALATION) at 20:25

## 2018-09-25 RX ADMIN — PREDNISONE 10 MG: 10 TABLET ORAL at 08:30

## 2018-09-25 RX ADMIN — NICOTINE 1 PATCH: 14 PATCH, EXTENDED RELEASE TRANSDERMAL at 18:23

## 2018-09-25 RX ADMIN — IPRATROPIUM BROMIDE 0.5 MG: 0.5 SOLUTION RESPIRATORY (INHALATION) at 11:20

## 2018-09-25 RX ADMIN — GUAIFENESIN 600 MG: 600 TABLET, EXTENDED RELEASE ORAL at 08:30

## 2018-09-25 RX ADMIN — IPRATROPIUM BROMIDE 0.5 MG: 0.5 SOLUTION RESPIRATORY (INHALATION) at 08:35

## 2018-09-25 RX ADMIN — Medication 4 ML: at 08:35

## 2018-09-25 RX ADMIN — BUDESONIDE 0.25 MG: 0.25 INHALANT RESPIRATORY (INHALATION) at 20:25

## 2018-09-25 RX ADMIN — ARFORMOTEROL TARTRATE 15 MCG: 15 SOLUTION RESPIRATORY (INHALATION) at 08:35

## 2018-09-26 VITALS
BODY MASS INDEX: 24.23 KG/M2 | SYSTOLIC BLOOD PRESSURE: 128 MMHG | RESPIRATION RATE: 24 BRPM | TEMPERATURE: 97.7 F | OXYGEN SATURATION: 95 % | DIASTOLIC BLOOD PRESSURE: 67 MMHG | HEART RATE: 109 BPM | HEIGHT: 71 IN | WEIGHT: 173.1 LBS

## 2018-09-26 LAB
ANION GAP SERPL CALCULATED.3IONS-SCNC: 7.6 MMOL/L
BASOPHILS # BLD AUTO: 0.02 10*3/MM3 (ref 0–0.2)
BASOPHILS NFR BLD AUTO: 0.2 % (ref 0–1.5)
BUN BLD-MCNC: 22 MG/DL (ref 6–20)
BUN/CREAT SERPL: 38.6 (ref 7–25)
CALCIUM SPEC-SCNC: 8.8 MG/DL (ref 8.6–10.5)
CHLORIDE SERPL-SCNC: 97 MMOL/L (ref 98–107)
CO2 SERPL-SCNC: 36.4 MMOL/L (ref 22–29)
CREAT BLD-MCNC: 0.57 MG/DL (ref 0.76–1.27)
DEPRECATED RDW RBC AUTO: 50.8 FL (ref 37–54)
EOSINOPHIL # BLD AUTO: 0.2 10*3/MM3 (ref 0–0.7)
EOSINOPHIL NFR BLD AUTO: 2.3 % (ref 0.3–6.2)
ERYTHROCYTE [DISTWIDTH] IN BLOOD BY AUTOMATED COUNT: 14.1 % (ref 11.5–14.5)
GFR SERPL CREATININE-BSD FRML MDRD: 146 ML/MIN/1.73
GLUCOSE BLD-MCNC: 98 MG/DL (ref 65–99)
HCT VFR BLD AUTO: 44.9 % (ref 40.4–52.2)
HGB BLD-MCNC: 14 G/DL (ref 13.7–17.6)
IMM GRANULOCYTES # BLD: 0.06 10*3/MM3 (ref 0–0.03)
IMM GRANULOCYTES NFR BLD: 0.7 % (ref 0–0.5)
LYMPHOCYTES # BLD AUTO: 2.38 10*3/MM3 (ref 0.9–4.8)
LYMPHOCYTES NFR BLD AUTO: 27.2 % (ref 19.6–45.3)
MCH RBC QN AUTO: 30.5 PG (ref 27–32.7)
MCHC RBC AUTO-ENTMCNC: 31.2 G/DL (ref 32.6–36.4)
MCV RBC AUTO: 97.8 FL (ref 79.8–96.2)
MONOCYTES # BLD AUTO: 0.94 10*3/MM3 (ref 0.2–1.2)
MONOCYTES NFR BLD AUTO: 10.7 % (ref 5–12)
NEUTROPHILS # BLD AUTO: 5.22 10*3/MM3 (ref 1.9–8.1)
NEUTROPHILS NFR BLD AUTO: 59.6 % (ref 42.7–76)
PLATELET # BLD AUTO: 257 10*3/MM3 (ref 140–500)
PMV BLD AUTO: 9.7 FL (ref 6–12)
POTASSIUM BLD-SCNC: 4.2 MMOL/L (ref 3.5–5.2)
RBC # BLD AUTO: 4.59 10*6/MM3 (ref 4.6–6)
SODIUM BLD-SCNC: 141 MMOL/L (ref 136–145)
WBC NRBC COR # BLD: 8.76 10*3/MM3 (ref 4.5–10.7)

## 2018-09-26 PROCEDURE — 94668 MNPJ CHEST WALL SBSQ: CPT

## 2018-09-26 PROCEDURE — 94799 UNLISTED PULMONARY SVC/PX: CPT

## 2018-09-26 PROCEDURE — 94660 CPAP INITIATION&MGMT: CPT

## 2018-09-26 PROCEDURE — 80048 BASIC METABOLIC PNL TOTAL CA: CPT | Performed by: INTERNAL MEDICINE

## 2018-09-26 PROCEDURE — 85025 COMPLETE CBC W/AUTO DIFF WBC: CPT | Performed by: INTERNAL MEDICINE

## 2018-09-26 RX ORDER — ARFORMOTEROL TARTRATE 15 UG/2ML
15 SOLUTION RESPIRATORY (INHALATION)
Qty: 120 ML | Refills: 0 | Status: SHIPPED | OUTPATIENT
Start: 2018-09-26

## 2018-09-26 RX ORDER — NICOTINE 21 MG/24HR
1 PATCH, TRANSDERMAL 24 HOURS TRANSDERMAL EVERY 24 HOURS
Qty: 14 PATCH | Refills: 0 | Status: SHIPPED | OUTPATIENT
Start: 2018-09-26

## 2018-09-26 RX ORDER — LEVOFLOXACIN 750 MG/1
750 TABLET ORAL EVERY 24 HOURS
Qty: 1 TABLET | Refills: 0 | Status: SHIPPED | OUTPATIENT
Start: 2018-09-26 | End: 2018-09-27

## 2018-09-26 RX ORDER — GUAIFENESIN 600 MG/1
600 TABLET, EXTENDED RELEASE ORAL EVERY 12 HOURS SCHEDULED
Qty: 60 TABLET | Refills: 0 | Status: SHIPPED | OUTPATIENT
Start: 2018-09-26

## 2018-09-26 RX ORDER — FUROSEMIDE 40 MG/1
40 TABLET ORAL DAILY
Qty: 30 TABLET | Refills: 0 | Status: SHIPPED | OUTPATIENT
Start: 2018-09-27

## 2018-09-26 RX ORDER — IPRATROPIUM BROMIDE AND ALBUTEROL SULFATE 2.5; .5 MG/3ML; MG/3ML
3 SOLUTION RESPIRATORY (INHALATION) EVERY 4 HOURS PRN
Qty: 360 ML | Refills: 1 | Status: SHIPPED | OUTPATIENT
Start: 2018-09-26

## 2018-09-26 RX ORDER — BUDESONIDE 0.25 MG/2ML
0.25 INHALANT ORAL
Qty: 60 EACH | Refills: 0 | Status: SHIPPED | OUTPATIENT
Start: 2018-09-26

## 2018-09-26 RX ADMIN — BUDESONIDE 0.25 MG: 0.25 INHALANT RESPIRATORY (INHALATION) at 10:34

## 2018-09-26 RX ADMIN — IPRATROPIUM BROMIDE 0.5 MG: 0.5 SOLUTION RESPIRATORY (INHALATION) at 13:02

## 2018-09-26 RX ADMIN — ARFORMOTEROL TARTRATE 15 MCG: 15 SOLUTION RESPIRATORY (INHALATION) at 10:34

## 2018-09-26 RX ADMIN — IPRATROPIUM BROMIDE 0.5 MG: 0.5 SOLUTION RESPIRATORY (INHALATION) at 15:51

## 2018-09-26 RX ADMIN — IPRATROPIUM BROMIDE 0.5 MG: 0.5 SOLUTION RESPIRATORY (INHALATION) at 08:31

## 2018-09-26 RX ADMIN — Medication 4 ML: at 08:31

## 2018-09-26 RX ADMIN — LEVOFLOXACIN 750 MG: 750 TABLET, FILM COATED ORAL at 16:54

## 2018-09-26 RX ADMIN — FUROSEMIDE 40 MG: 40 TABLET ORAL at 08:58

## 2018-09-26 RX ADMIN — GUAIFENESIN 600 MG: 600 TABLET, EXTENDED RELEASE ORAL at 08:58

## 2018-09-26 RX ADMIN — NICOTINE 1 PATCH: 14 PATCH, EXTENDED RELEASE TRANSDERMAL at 16:54

## 2018-10-01 NOTE — PAYOR COMM NOTE
"Lucas Paul  (58 y.o. Male)     ATTN: ARTHUR WALKER REF#[id number]   D/C SUMMARY   PLEASE CALL BACK PENDING LENGTH OF STAY TO DEANA PULLIAM@  [phone] OR [phone]  THANKS!   DEANA      Date of Birth Social Security Number Address Home Phone MRN    1960  [address]  [place] [address] [phone] [id number]    T[employer] Marital Status          None        Admission Date Admission Type Admitting Provider Attending Provider Department, Room/Bed    9/14/18 Elective Garrick Ritter MD  [address], [address]/1    Discharge Date Discharge Disposition Discharge Destination        9/26/2018 Skilled Nursing Facility (DC - External)              Attending Provider:  (none)   Allergies:  No Known Allergies    Isolation:  None   Infection:  None   Code Status:  Prior    Ht:  179.1 cm (70.5\")   Wt:  78.5 kg (173 lb 1.6 oz)    Admission Cmt:  None   Principal Problem:  None                Active Insurance as of 9/14/2018     Primary Coverage     Payor Plan Insurance Group Employer/Plan Group    [employer] [place] [employer]ICA[employer]      Payor Plan Address Payor Plan Phone Number Effective From Effective To    [address] [address] [phone] 7/5/2018     [facility] [id number]       Subscriber Name Subscriber Birth Date Member ID       LUCAS PAUL 1960 [id number]                 Emergency Contacts      (Rel.) Home Phone Work Phone Mobile Phone    Yenny Childers (Daughter) [phone] -- --    Faizan Paul (Son) -- -- [phone]               Discharge Summary      Antoni Steinberg MD at 9/26/2018  3:08 PM                  Date of Discharge:  9/26/2018    Discharge Diagnoses:  1.  On chronic hypoxemic and hypercapnic respiratory failure  2. 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 Acute exacerbation COPD  4. Left lower lobe pneumonia  5. Tobacco abuse  6. Medical noncompliance  7. Incidental right kidney " lesion      Hospital Course  Patient is a 58 y.o. male presented with respiratory failure he has known severe COPD he continues to smoke is not been compliant with his medicines are as oxygen and he got to the point of severe respiratory failure he has had a rather prolonged course but he has responded extremely well to noninvasive ventilation and I do recommend a volume assured noninvasive ventilator he is going with the orders for such already completed them assure the machine's are available at rehabilitation for him.  He has mild pulmonary hypertension probably secondary to his chronic lung disease and hypoxemia he has diuresed tremendously in the hospital and I think this is a combination getting him on oxygen off the cigarettes on the noninvasive ventilator and diuretics.  He is now on 40 mg daily Lasix he probably should have an electrolyte panel checked the first of the week to make sure his renal function and potassium are okay his creatinine is been in the 0.6 range and patient did have a little left lower lobe atelectasis infiltrate and he should have a follow-up chest x-ray in 4-6 weeks to ensure that this clears additionally he had an incidental lesion on his cell the chest and radiology is recommended that he have a follow-up CT scan with renal protocol.  He is been because respiratory having difficulty lying flat and because it was no chance he was a candidate for intervention with his lung disease we elected to delay this and have asked him to follow-up with his primary care physician to get a follow-up CT scan of his kidney renal protocol when he is better assess that he is improved dramatically he is now weaned down to 2-1/2 L nasal cannula O2 and still saturating well when he exercises he does desaturate and he will need increased supplemental oxygen with exertion.  And he needs oxygen bled into his noninvasive ventilator at night the same flow as he is been using during the day..      Procedures  Performed         Consults:   Consults     Date and Time Order Name Status Description    9/14/2018 1547 Inpatient Cardiology Consult Completed           Pertinent Test Results:   Labs:    Results from last 7 days  Lab Units 09/26/18  0535 09/25/18  0459 09/24/18  0500   GLUCOSE mg/dL 98 100* 121*   SODIUM mmol/L 141 144 142   POTASSIUM mmol/L 4.2 4.4 3.8   CO2 mmol/L 36.4* 41.6* 39.1*   CHLORIDE mmol/L 97* 95* 94*   ANION GAP mmol/L 7.6 7.4 8.9   CREATININE mg/dL 0.57* 0.67* 0.58*   BUN mg/dL 22* 18 20   BUN / CREAT RATIO  38.6* 26.9* 34.5*   CALCIUM mg/dL 8.8 9.3 8.9   EGFR IF NONAFRICN AM mL/min/1.73 146 121 143     Estimated Creatinine Clearance: 154.9 mL/min (A) (by C-G formula based on SCr of 0.57 mg/dL (L)).        Results from last 7 days  Lab Units 09/26/18  0535 09/25/18  0459 09/24/18  0500  09/22/18  0443  09/20/18  0354   WBC 10*3/mm3 8.76 9.12 9.44  < > 8.33  < > 11.18*   RBC 10*6/mm3 4.59* 4.54* 4.31*  < > 4.20*  < > 4.30*   HEMOGLOBIN g/dL 14.0 14.0 13.3*  < > 12.8*  < > 13.3*   HEMATOCRIT % 44.9 44.9 42.2  < > 41.6  < > 42.0   MCV fL 97.8* 98.9* 97.9*  < > 99.0*  < > 97.7*   MCH pg 30.5 30.8 30.9  < > 30.5  < > 30.9   MCHC g/dL 31.2* 31.2* 31.5*  < > 30.8*  < > 31.7*   RDW % 14.1 14.1 14.1  < > 14.2  < > 14.6*   RDW-SD fl 50.8 51.2 51.0  < > 51.4  < > 52.0   MPV fL 9.7 10.0 9.8  < > 10.1  < > 10.8   PLATELETS 10*3/mm3 257 265 245  < > 223  < > 218   NEUTROPHIL % % 59.6 61.5 59.5  < > 61.2  < > 66.8   LYMPHOCYTE % % 27.2 27.4 29.0  < > 24.1  < > 21.7   MONOCYTES % % 10.7 8.9 9.1  < > 13.9*  < > 11.1   EOSINOPHIL % % 2.3 2.0 2.3  < > 0.7  < > 0.3   BASOPHIL % % 0.2 0.2 0.1  < > 0.1  < > 0.1   IMM GRAN % % 0.7* 0.4 0.2  < > 0.2  < > 0.2   NEUTROS ABS 10*3/mm3 5.22 5.61 5.61  < > 5.09  < > 7.47   LYMPHS ABS 10*3/mm3 2.38 2.50 2.74  < > 2.01  < > 2.43   MONOS ABS 10*3/mm3 0.94 0.81 0.86  < > 1.16  < > 1.24*   EOS ABS 10*3/mm3 0.20 0.18 0.22  < > 0.06  < > 0.03   BASOS ABS 10*3/mm3 0.02 0.02 0.01  < >  0.01  < > 0.01   IMMATURE GRANS (ABS) 10*3/mm3 0.06* 0.04* 0.02  < > 0.02  < > 0.02   NRBC /100 WBC  --   --   --   --  0.0  --  0.0   < > = values in this interval not displayed.    Results from last 7 days  Lab Units 09/21/18  0836   PH, ARTERIAL pH units 7.323*   PO2 ART mm Hg 68.5*   PCO2, ARTERIAL mm Hg 78.1*   HCO3 ART mmol/L 40.6*       Results from last 7 days  Lab Units 09/20/18  0634   TROPONIN T ng/mL 0.045*               Results from last 7 days  Lab Units 09/20/18  0634   PROCALCITONIN ng/mL 0.41*           Imaging Results (last 72 hours)     Procedure Component Value Units Date/Time    XR Chest 1 View [[id number]] Collected:  09/23/18 0541     Updated:  09/23/18 2301    Narrative:       X-RAY CHEST 1 VIEW.     HISTORY: Effusion, atelectasis, infiltrate.     COMPARISON: 9/19/2018.     FINDINGS:  Cardiomediastinal silhouette is within normal limits.         Improved lung volumes. Improved atelectasis at the right lung base,  minimal residual changes remain.     Small left effusion with atelectasis/infiltrate, interval improvement.               Impression:       Improving atelectasis at the right lung base.  Improving effusion and associated parenchymal opacity in the left lung  base.         This report was finalized on 9/23/2018 10:58 PM by Dr. Kaila Graves M.D.           Results for orders placed during the hospital encounter of 09/14/18   Adult Transthoracic Echo Complete W/ Cont if Necessary Per Protocol    Narrative · Left ventricular systolic function is normal. Estimated EF = 60%.  · Normal left ventricular cavity size and wall thickness noted. All left   ventricular wall segments contract normally.  · Left ventricular diastolic function is normal.              Condition on Discharge:  Dramatically improved    Vital Signs  Temp:  [97.3 °F (36.3 °C)-98.1 °F (36.7 °C)] 97.7 °F (36.5 °C)  Heart Rate:  [] 108  Resp:  [18-24] 20  BP: (105-129)/(65-94) 128/67  FiO2 (%):  [30 %] 30 %    Physical  Exam:    General Appearance: Well-developed white male he is sitting in a chair on 2.5 L nasal cannula O2 he doesn't appear in any acute distress his oxygen saturations are 93%  Eyes: Conjunctiva are clear and anicteric  ENT: Mucous membranes are dry his voice is hoarse  Neck: Trachea midline no adenopathy or thyromegaly.  I don't appreciate any jugular venous distention today  Lungs: Almost no air movement bilaterally I don't hear breath sounds I really can't percuss any dullness the left is about equal to the right chest expansion he is not laboring  Cardiac: Regular rate and rhythm no murmur  Abdomen: Soft no palpable organomegaly or masses  : Not examined  Musc/Skel: Grossly normal  Skin: No jaundice no petechiae  Neuro: Alert cooperative following commands  Extremities/P Vascular: No clubbing no cyanosis he does have 1+ pitting pretibial edema extending above the knees on the left only trace on the right palpable radial dorsalis pedis pulses.  The edema is significantly decreased from yesterday  MSE: Seems to be in reasonably good spirits                    Discharge Disposition  Skilled Nursing Facility (DC - External)    Discharge Medications     Discharge Medications      New Medications      Instructions Start Date   arformoterol 15 MCG/2ML nebulizer solution  Commonly known as:  BROVANA   15 mcg, Nebulization, 2 Times Daily - RT      budesonide 0.25 MG/2ML nebulizer solution  Commonly known as:  PULMICORT   0.25 mg, Nebulization, 2 Times Daily - RT      guaiFENesin 600 MG 12 hr tablet  Commonly known as:  MUCINEX  Replaces:  MUCUS RELIEF 400 MG tablet   600 mg, Oral, Every 12 Hours Scheduled      ipratropium 0.02 % nebulizer solution  Commonly known as:  ATROVENT   500 mcg, Nebulization, 4 Times Daily - RT      levoFLOXacin 750 MG tablet  Commonly known as:  LEVAQUIN   750 mg, Oral, Every 24 Hours      nicotine 14 MG/24HR patch  Commonly known as:  NICODERM CQ  Replaces:  nicotine 21 MG/24HR patch   1  patch, Transdermal, Every 24 Hours      nicotine polacrilex 2 MG gum  Commonly known as:  NICORETTE   2 mg, Mouth/Throat, Every 1 Hour PRN         Changes to Medications      Instructions Start Date   furosemide 40 MG tablet  Commonly known as:  LASIX  What changed:  · medication strength  · how much to take   40 mg, Oral, Daily      ipratropium-albuterol 0.5-2.5 mg/3 ml nebulizer  Commonly known as:  DUONEB  What changed:  · when to take this  · reasons to take this   3 mL, Nebulization, Every 4 Hours PRN         Stop These Medications    BEVESPI AEROSPHERE 9-4.8 MCG/ACT aerosol  Generic drug:  Glycopyrrolate-Formoterol     MUCUS RELIEF 400 MG tablet  Generic drug:  guaiFENesin  Replaced by:  guaiFENesin 600 MG 12 hr tablet     nicotine 21 MG/24HR patch  Commonly known as:  NICODERM CQ  Replaced by:  nicotine 14 MG/24HR patch     predniSONE 10 MG tablet  Commonly known as:  DELTASONE            Discharge Diet:  healthy heart    Activity at Discharge:  to rehabilitation for strengthening he will need his O2 titrated with exertion    Follow-up Appointments      Test Results Pending at Discharge       Antoni Steinberg MD  09/26/18  3:08 PM    Time:           Electronically signed by Antoni Steinberg MD at 9/26/2018  3:17 PM

## 2018-10-16 ENCOUNTER — TRANSCRIBE ORDERS (OUTPATIENT)
Dept: CARDIAC REHAB | Facility: HOSPITAL | Age: 58
End: 2018-10-16

## 2018-10-16 DIAGNOSIS — J44.9 COPD, VERY SEVERE (HCC): Primary | ICD-10-CM

## 2018-10-17 ENCOUNTER — APPOINTMENT (OUTPATIENT)
Dept: CARDIAC REHAB | Facility: HOSPITAL | Age: 58
End: 2018-10-17

## 2019-01-03 ENCOUNTER — HOSPITAL ENCOUNTER (OUTPATIENT)
Dept: OTHER | Facility: HOSPITAL | Age: 59
Discharge: HOME | End: 2019-01-03
Attending: INTERNAL MEDICINE

## 2019-01-03 ENCOUNTER — OFFICE VISIT CONVERTED (OUTPATIENT)
Dept: PULMONOLOGY | Facility: CLINIC | Age: 59
End: 2019-01-03
Attending: INTERNAL MEDICINE

## 2019-01-03 LAB
25(OH)D3 SERPL-MCNC: 20.8 NG/ML (ref 30–100)
T4 FREE SERPL-MCNC: 1.5 NG/DL (ref 0.9–1.8)
TSH SERPL-ACNC: 1.03 M[IU]/L (ref 0.27–4.2)

## 2019-01-07 LAB — CONV ACETYLCHOLINE RECEP-BINDING: <0.03 NMOL/L (ref 0–0.24)

## 2019-02-14 ENCOUNTER — OFFICE VISIT (OUTPATIENT)
Dept: NEUROLOGY | Facility: CLINIC | Age: 59
End: 2019-02-14

## 2019-02-14 ENCOUNTER — LAB (OUTPATIENT)
Dept: LAB | Facility: HOSPITAL | Age: 59
End: 2019-02-14

## 2019-02-14 VITALS
SYSTOLIC BLOOD PRESSURE: 100 MMHG | BODY MASS INDEX: 22.4 KG/M2 | WEIGHT: 160 LBS | HEIGHT: 71 IN | DIASTOLIC BLOOD PRESSURE: 62 MMHG

## 2019-02-14 DIAGNOSIS — G12.21 AMYOTROPHIC LATERAL SCLEROSIS (ALS) (HCC): Primary | ICD-10-CM

## 2019-02-14 DIAGNOSIS — G12.21 AMYOTROPHIC LATERAL SCLEROSIS (ALS) (HCC): ICD-10-CM

## 2019-02-14 LAB
CK SERPL-CCNC: 304 U/L (ref 20–200)
ERYTHROCYTE [SEDIMENTATION RATE] IN BLOOD: 4 MM/HR (ref 0–20)
FOLATE SERPL-MCNC: 8.04 NG/ML (ref 4.78–24.2)
HBA1C MFR BLD: 5.7 % (ref 4.8–5.6)
T4 FREE SERPL-MCNC: 1.42 NG/DL (ref 0.93–1.7)
TSH SERPL DL<=0.05 MIU/L-ACNC: 1.36 MIU/ML (ref 0.27–4.2)
VIT B12 BLD-MCNC: 500 PG/ML (ref 211–946)

## 2019-02-14 PROCEDURE — 85652 RBC SED RATE AUTOMATED: CPT | Performed by: PSYCHIATRY & NEUROLOGY

## 2019-02-14 PROCEDURE — 86592 SYPHILIS TEST NON-TREP QUAL: CPT | Performed by: PSYCHIATRY & NEUROLOGY

## 2019-02-14 PROCEDURE — 36415 COLL VENOUS BLD VENIPUNCTURE: CPT | Performed by: PSYCHIATRY & NEUROLOGY

## 2019-02-14 PROCEDURE — 82746 ASSAY OF FOLIC ACID SERUM: CPT | Performed by: PSYCHIATRY & NEUROLOGY

## 2019-02-14 PROCEDURE — 84155 ASSAY OF PROTEIN SERUM: CPT | Performed by: PSYCHIATRY & NEUROLOGY

## 2019-02-14 PROCEDURE — 83655 ASSAY OF LEAD: CPT | Performed by: PSYCHIATRY & NEUROLOGY

## 2019-02-14 PROCEDURE — 86334 IMMUNOFIX E-PHORESIS SERUM: CPT

## 2019-02-14 PROCEDURE — 82175 ASSAY OF ARSENIC: CPT | Performed by: PSYCHIATRY & NEUROLOGY

## 2019-02-14 PROCEDURE — 83036 HEMOGLOBIN GLYCOSYLATED A1C: CPT | Performed by: PSYCHIATRY & NEUROLOGY

## 2019-02-14 PROCEDURE — 84439 ASSAY OF FREE THYROXINE: CPT | Performed by: PSYCHIATRY & NEUROLOGY

## 2019-02-14 PROCEDURE — 82595 ASSAY OF CRYOGLOBULIN: CPT

## 2019-02-14 PROCEDURE — 82607 VITAMIN B-12: CPT | Performed by: PSYCHIATRY & NEUROLOGY

## 2019-02-14 PROCEDURE — 82525 ASSAY OF COPPER: CPT

## 2019-02-14 PROCEDURE — 84443 ASSAY THYROID STIM HORMONE: CPT | Performed by: PSYCHIATRY & NEUROLOGY

## 2019-02-14 PROCEDURE — 82784 ASSAY IGA/IGD/IGG/IGM EACH: CPT

## 2019-02-14 PROCEDURE — 99244 OFF/OP CNSLTJ NEW/EST MOD 40: CPT | Performed by: PSYCHIATRY & NEUROLOGY

## 2019-02-14 PROCEDURE — 84165 PROTEIN E-PHORESIS SERUM: CPT | Performed by: PSYCHIATRY & NEUROLOGY

## 2019-02-14 PROCEDURE — 82550 ASSAY OF CK (CPK): CPT

## 2019-02-14 PROCEDURE — 83825 ASSAY OF MERCURY: CPT | Performed by: PSYCHIATRY & NEUROLOGY

## 2019-02-14 RX ORDER — PANTOPRAZOLE SODIUM 40 MG/1
40 TABLET, DELAYED RELEASE ORAL DAILY
Refills: 2 | COMMUNITY
Start: 2019-02-08

## 2019-02-14 RX ORDER — SERTRALINE HYDROCHLORIDE 25 MG/1
TABLET, FILM COATED ORAL
Refills: 2 | COMMUNITY
Start: 2019-01-27 | End: 2019-05-21

## 2019-02-14 RX ORDER — PHENOL 1.4 %
10 AEROSOL, SPRAY (ML) MUCOUS MEMBRANE NIGHTLY
COMMUNITY

## 2019-02-14 RX ORDER — CHOLECALCIFEROL (VITAMIN D3) 1250 MCG
CAPSULE ORAL
Refills: 0 | Status: ON HOLD | COMMUNITY
Start: 2019-02-05 | End: 2019-05-12

## 2019-02-14 NOTE — PROGRESS NOTES
CC: Progressive weakness all 4 extremities and slurred speech    HPI:  Tino Paul is a  59 y.o.  right-handed white male who was sent by  who is his pulmonologist for neurologic consultation regarding progressive weakness of all 4 extremities and slurred speech.  The patient has history of COPD.  He spent for 5 months early part of last year in [place] helping a friend of his.  When he was seen before that by his pulmonary specialist some muscle twitching was seen in the arms and he had been told he should see a neurologist.  He saw Dr. Mika Thomas in [place] in April 2018.  He did cervical spine x-rays and an MRI and an EMG.  This was all negative according to the patient and his daughter who came with him today.  She added to the history.  I do not have the records from Dr. Thomas at this time.  About September of last year he developed some dysarthria which has become progressively worse.  He has had speech therapy for 3 or 4 months which initially seemed to help his speech but now seems to be getting worse.  He denies any numbness tingling or burning in his hands or feet.  He has notable atrophy in his intrinsic hand muscles and he describes cramping in his hamstrings biceps and hand muscles.  He has history of a couple of head injuries neither which caused loss of consciousness.  Both occurred during childhood.  He denies history of meningitis seizures stroke or syncope.  There is no family history of a progressive neuromuscular disease.  There is family history of Parkinson's disease in his mother who also has some degree of dementia in her 80s.  His paternal grandmother had dementia in her 90s.    He uses a BiPAP machine and is not able to lay flat without it.  He is supposed to get a CT scan but cannot lay flat and apparently is getting his scan at a facility which can do him on his side.        Past Medical History:   Diagnosis Date   • AAA (abdominal aortic aneurysm) (CMS/[facility])    • COPD  (chronic obstructive pulmonary disease) (CMS/HCC)          Past Surgical History:   Procedure Laterality Date   • CHOLECYSTECTOMY             Current Outpatient Medications:   •  arformoterol (BROVANA) 15 MCG/2ML nebulizer solution, Take 2 mL by nebulization 2 (Two) Times a Day., Disp: 120 mL, Rfl: 0  •  budesonide (PULMICORT) 0.25 MG/2ML nebulizer solution, Take 2 mL by nebulization 2 (Two) Times a Day., Disp: 60 each, Rfl: 0  •  Cholecalciferol (VITAMIN D3) 58076 units capsule, TK 1 C PO ONCE WEEKLY., Disp: , Rfl: 0  •  furosemide (LASIX) 40 MG tablet, Take 1 tablet by mouth Daily. (Patient taking differently: Take 20 mg by mouth Daily.), Disp: 30 tablet, Rfl: 0  •  guaiFENesin (MUCINEX) 600 MG 12 hr tablet, Take 1 tablet by mouth Every 12 (Twelve) Hours., Disp: 60 tablet, Rfl: 0  •  Melatonin 10 MG tablet, Take  by mouth., Disp: , Rfl:   •  pantoprazole (PROTONIX) 40 MG EC tablet, Take  by mouth Daily., Disp: , Rfl: 2  •  sertraline (ZOLOFT) 25 MG tablet, TK 1 T PO D FOR ANXIETY, Disp: , Rfl: 2  •  ipratropium (ATROVENT) 0.02 % nebulizer solution, Take 2.5 mL by nebulization 4 (Four) Times a Day., Disp: 300 mL, Rfl: 12  •  ipratropium-albuterol (DUONEB) 0.5-2.5 mg/3 ml nebulizer, Take 3 mL by nebulization Every 4 (Four) Hours As Needed for Wheezing or Shortness of Air., Disp: 360 mL, Rfl: 1  •  nicotine (NICODERM CQ) 14 MG/24HR patch, Place 1 patch on the skin as directed by provider Daily., Disp: 14 patch, Rfl: 0  •  nicotine polacrilex (NICORETTE) 2 MG gum, Chew 1 each Every 1 (One) Hour As Needed for Smoking Cessation., Disp: 300 each, Rfl: 0      Family History   Problem Relation Age of Onset   • Dementia Mother    • Parkinsonism Mother    • Dementia Paternal Grandmother          Social History     Socioeconomic History   • Marital status:      Spouse name: Not on file   • Number of children: Not on file   • Years of education: Not on file   • Highest education level: Not on file   Social Needs   •  Financial resource strain: Not on file   • Food insecurity - worry: Not on file   • Food insecurity - inability: Not on file   • Transportation needs - medical: Not on file   • Transportation needs - non-medical: Not on file   Occupational History   • Not on file   Tobacco Use   • Smoking status: Current Every Day Smoker     Packs/day: 1.00     Types: Cigarettes   Substance and Sexual Activity   • Alcohol use: No   • Drug use: No   • Sexual activity: Defer   Other Topics Concern   • Not on file   Social History Narrative   • Not on file         No Known Allergies      Pain Scale: Some neck stiffness but really no significant painful symptoms        ROS:  Review of Systems   Constitutional: Positive for activity change and unexpected weight change. Negative for appetite change.   HENT: Positive for drooling, rhinorrhea, trouble swallowing and voice change. Negative for ear pain, facial swelling and hearing loss.    Eyes: Negative for pain, redness and itching.   Respiratory: Positive for choking and shortness of breath. Negative for cough.    Cardiovascular: Negative for chest pain and leg swelling.   Gastrointestinal: Negative for abdominal pain, nausea and vomiting.   Endocrine: Negative for cold intolerance and heat intolerance.   Musculoskeletal: Positive for neck stiffness. Negative for arthralgias, back pain and joint swelling.   Skin: Negative for color change, pallor, rash and wound.   Allergic/Immunologic: Negative for environmental allergies and food allergies.   Neurological: Positive for speech difficulty and weakness. Negative for dizziness, tremors, seizures, syncope, facial asymmetry, light-headedness, numbness and headaches.   Hematological: Bruises/bleeds easily.   Psychiatric/Behavioral: Negative for agitation, behavioral problems, confusion, decreased concentration, dysphoric mood, hallucinations, self-injury, sleep disturbance and suicidal ideas. The patient is not nervous/anxious and is not  "hyperactive.            Physical Exam:  Vitals:    02/14/19 0802   BP: 100/62   Weight: 72.6 kg (160 lb)   Height: 179.1 cm (70.5\")     Body mass index is 22.63 kg/m².    Physical Exam  Gen.: Clear evidence of distal atrophy in the hands and forearms.  HEENT: Normocephalic no evidence of trauma.  Discs flat.  No A-V nicking.  Throat negative.  Neck: Supple but some reduced range of motion which seems arthritic.  No cervical bruits.  Radial pulses were strong and simultaneous.  Heart: Regular rate and rhythm without murmurs.  No pedal edema.      Neurological Exam:   Mental status: Awake alert oriented conversant but dysarthric.  No evidence of an affective disorder thought disorder delusions or hallucinations.  HCF: No aphasia or apraxia.  There is dysarthria.  Recent and remote recall appear intact.  Knowledge of recent events intact.  Cranial nerves: 2-12 intact.  I do not see clearcut tongue atrophy or fasciculations  Motor: Increased tone in arms and legs.  There are fasciculations seen in his biceps forearms as well as in his calves.  He has diffuse weakness with severe weakness and atrophy in his intrinsic hand muscles bilaterally.  He also has severe weakness of ankle and toe dorsiflexion.  Reflexes: Diffusely brisk with spread to contiguous myotomes.  Ankle jerks however are reduced.  His toe signs are silent (probably due to his severe dorsiflexor weakness)  Sensory: Normal light touch and pinprick arms and legs.  Vibration sense question we diminished at the ankles with normal position sense in the great toes.  Cerebellar: Finger to nose is not dysmetric rapid movements are a little slow.  Heel-to-shin is not dysmetric.  Gait and Station: He requires some help to come to stand.  He walks pushing a wheelchair        Results:      Lab Results   Component Value Date    GLUCOSE 98 09/26/2018    BUN 22 (H) 09/26/2018    CREATININE 0.57 (L) 09/26/2018    EGFRIFNONA 146 09/26/2018    BCR 38.6 (H) 09/26/2018    " CO2 36.4 (H) 09/26/2018    CALCIUM 8.8 09/26/2018    ALBUMIN 4.50 07/05/2018    AST 19 07/05/2018    ALT 26 07/05/2018       Lab Results   Component Value Date    WBC 8.76 09/26/2018    HGB 14.0 09/26/2018    HCT 44.9 09/26/2018    MCV 97.8 (H) 09/26/2018     09/26/2018         .No results found for: RPR      No results found for: TSH, N1LDMEL, R1AMSGK, THYROIDAB      No results found for: YSFITLDW63      No results found for: FOLATE      No results found for: HGBA1C            Assessment:   1.  Strong suspicion of amyotrophic lateral sclerosis          Plan:  1.  Repeat EMG 1 arm 1 leg and possibly cranial nerve muscles and thoracic paraspinals  2.  MRI brain, thoracic and lumbar spine but we'll need to identify a standup scanner that we'll do these.  3.  Labs including sedimentation rate, RPR, B12 and folate, thyroid functions, SPEP and IEP, cryoglobulins, heavy metals, copper level.  He states some recent normal blood tests done in [place] which they will collect and bring to me.  I need to see his calcium level in particular  4.  He will see me back for his EMG and will review his labs and hopefully get his MRIs schedule.  Also to further discuss medical treatment and some changes in his pulmonary support since he is likely to have both an inspiratory and expiratory problem..            At least 30 minutes of this 60 minute consult were spent counseling the patient about the diagnosis the treatment and management issues.  Prognosis questions were discussed.              Dictated utilizing Dragon dictation.

## 2019-02-15 LAB
ALBUMIN SERPL-MCNC: 3.6 G/DL (ref 2.9–4.4)
ALBUMIN/GLOB SERPL: 0.9 {RATIO} (ref 0.7–1.7)
ALPHA1 GLOB FLD ELPH-MCNC: 0.3 G/DL (ref 0–0.4)
ALPHA2 GLOB SERPL ELPH-MCNC: 1 G/DL (ref 0.4–1)
B-GLOBULIN SERPL ELPH-MCNC: 1.4 G/DL (ref 0.7–1.3)
GAMMA GLOB SERPL ELPH-MCNC: 1.3 G/DL (ref 0.4–1.8)
GLOBULIN SER CALC-MCNC: 4.1 G/DL (ref 2.2–3.9)
IGA SERPL-MCNC: 511 MG/DL (ref 90–386)
IGG SERPL-MCNC: 1075 MG/DL (ref 700–1600)
IGM SERPL-MCNC: 46 MG/DL (ref 20–172)
Lab: ABNORMAL
M-SPIKE: ABNORMAL G/DL
PROT PATTERN SERPL ELPH-IMP: ABNORMAL
PROT PATTERN SERPL IFE-IMP: ABNORMAL
PROT SERPL-MCNC: 7.7 G/DL (ref 6–8.5)
RPR SER QL: NORMAL

## 2019-02-16 LAB — COPPER SERPL-MCNC: 141 UG/DL (ref 72–166)

## 2019-02-18 LAB — CRYOGLOB SER QL 1D COLD INC: NORMAL

## 2019-02-19 LAB
ARSENIC BLD-MCNC: 3 UG/L (ref 2–23)
LEAD BLD-MCNC: 1 UG/DL (ref 0–4)
MERCURY BLD-MCNC: NORMAL UG/L (ref 0–14.9)

## 2019-02-21 ENCOUNTER — TELEPHONE (OUTPATIENT)
Dept: NEUROLOGY | Facility: CLINIC | Age: 59
End: 2019-02-21

## 2019-02-21 NOTE — TELEPHONE ENCOUNTER
----- Message from Sheila Womack RN sent at 2/21/2019  9:03 AM EST -----  Contact: Yenny- Chuy daughter- [phone]  Miki,    Carline patients daughter called asking about the lab results that Dr. Padilla had ordered.   Please call her with results when they are available.    Thank you

## 2019-02-23 NOTE — TELEPHONE ENCOUNTER
Elmoesha,    Labs showed lowest level of prediabetes by the definition of the condition.  The immunofixation was indeterminate regarding  an abnormal protein. They suggest repeat in 3-6 months. I prefer to go ahead with a motor and sensory neuropathy panel on antibodies against nerve components.  The CK is a muscle enzyme test which can be elevated some in a primarily nerve damage condition. It is elevated to the degree often seen in a primarily neuropathic condition due to muscle atrophy when the motor nerve deteriorates.. Generally primary muscle diseases show much higher levels of CK due to active muscle breakdown.    The rest of the labs were normal    gns

## 2019-02-25 NOTE — TELEPHONE ENCOUNTER
S/w pt's daughter regarding the labs. She wanted know is there something he can do or take to improve those labs? Also needing an order for the motor and sensory neuropathy panel.

## 2019-02-27 DIAGNOSIS — G12.21 AMYOTROPHIC LATERAL SCLEROSIS (ALS) (HCC): Primary | ICD-10-CM

## 2019-02-28 NOTE — TELEPHONE ENCOUNTER
Motor and sensory neuropathy panel has not been ordered.  I think we should start considering placing him on riluzole or edaravone.  I normally send patients that I believe have ALS for a second opinion to Dr. Camarillo.  I don't think I have put the consult him because we didn't have his testing done yet.    Dennis

## 2019-03-05 ENCOUNTER — HOSPITAL ENCOUNTER (OUTPATIENT)
Dept: OTHER | Facility: HOSPITAL | Age: 59
Discharge: HOME | End: 2019-03-05

## 2019-03-07 ENCOUNTER — HOSPITAL ENCOUNTER (OUTPATIENT)
Dept: CARDIOLOGY | Facility: HOSPITAL | Age: 59
Discharge: HOME | End: 2019-03-07
Attending: INTERNAL MEDICINE

## 2019-03-14 LAB — Lab: NORMAL

## 2019-03-19 ENCOUNTER — PROCEDURE VISIT (OUTPATIENT)
Dept: NEUROLOGY | Facility: CLINIC | Age: 59
End: 2019-03-19

## 2019-03-19 VITALS — WEIGHT: 160 LBS | HEIGHT: 71 IN | BODY MASS INDEX: 22.4 KG/M2

## 2019-03-19 DIAGNOSIS — G12.21 AMYOTROPHIC LATERAL SCLEROSIS (ALS) (HCC): ICD-10-CM

## 2019-03-19 PROCEDURE — 99213 OFFICE O/P EST LOW 20 MIN: CPT | Performed by: PSYCHIATRY & NEUROLOGY

## 2019-03-19 PROCEDURE — 95886 MUSC TEST DONE W/N TEST COMP: CPT | Performed by: PSYCHIATRY & NEUROLOGY

## 2019-03-19 PROCEDURE — 95910 NRV CNDJ TEST 7-8 STUDIES: CPT | Performed by: PSYCHIATRY & NEUROLOGY

## 2019-03-19 RX ORDER — RILUZOLE 50 MG/1
50 TABLET, FILM COATED ORAL EVERY 12 HOURS
Qty: 60 TABLET | Refills: 5 | Status: ON HOLD | OUTPATIENT
Start: 2019-03-19 | End: 2019-05-12 | Stop reason: SINTOL

## 2019-03-19 NOTE — PROGRESS NOTES
Subjective   Tino Paul is a 59 y.o. male who I am seeing as a follow-up who I saw previously with clinical impression ALS.  The patient had been initially evaluated last summer in [facility] where he was at that time.  We have not been able to obtain those records despite requesting them.  Anyway the patient states that he had an MRI of the brain and probably cervical spine as well as an EMG none of which showed an origin for his symptoms.  He has had progressive weakness without numbness plus dysarthria and trouble swallowing.    Because he has severe COPD he is unable to lay flat because of shortness of air and so we were unable to obtain MRI scans of his spine.  We obtained laboratory analysis showing the lowest hemoglobin A1c consistent with prediabetes at 5.7%.  His SPEP and IEP demonstrated some abnormality but a clear-cut MGUS was not identified and a repeat in 3-6 months was recommended.  I went ahead and obtained a motor and sensory neuropathy panel but we have had difficulty getting the results which seems to be related to problems with our fax machine apparently.    EMG which I performed today showed evidence consistent with motor neuron disease although primarily motor neuropathy cannot be entirely excluded.    Review of Systems    Progressive weakness without numbness, balance trouble, dysarthria, trouble swallowing.  Weight loss of about 13 pounds since September.  Diffuse fasciculations.  Progressive muscle atrophy with weakness more apparent on the left arm than right and left leg than right    Objective   Physical Exam  General: Thin white male no acute distress  HEENT: Normocephalic no evidence of trauma.  Neck: Supple.  Heart: Regular rate and rhythm    Neurological exam  Mental status: Awake alert conversant with notable dysarthria and hypernasal speech.  No evidence of an affective disorder thought disorder delusions or hallucinations.  HCF: No aphasia or apraxia.  Much of the hand exam  though is affected by weakness and atrophy.  Recent and remote memory is intact  Cranial nerves: 2-12 intact with question of tongue fasciculations  Motor: Diffuse fasciculations are seen in multiple muscles of the arms shoulders and legs.  There is diffuse weakness with severe intrinsic hand atrophy more on the left than right.  In the lower extremities the weakness is moderate to severe.  Reflexes: Diffusely brisk  Sensory: Normal light touch arms and legs  Cerebellar: Not dysmetria but some of the functions are impaired by weakness  Gait and Station: In wheelchair    Assessment/Plan   Diagnoses and all orders for this visit:    Amyotrophic lateral sclerosis (ALS) (CMS/[facility])  -     EMG & Nerve Conduction Test  -     Ambulatory Referral to Neurology    Other orders  -     riluzole (RILUTEK) 50 MG tablet; Take 1 tablet by mouth Every 12 (Twelve) Hours.       1.  Still researching stand/sitting MRI scan.  There may be one in [facility] under the name [employer] phone number [phone] with website [email or web address].  2.  Awaiting results of motor and sensory neuropathy panel.  Still trying to get results from [place] evaluation last summer particularly what the MRI's showed  3.  Consult Dr. Mika Camarillo [facility] for second opinion/admission to the ALS clinic  4.  Follow-up 3 months but may wind up following up at the ALS clinic instead.

## 2019-03-19 NOTE — PROGRESS NOTES
EMG and Nerve Conduction Studies    Please see data sheets for details on methods, temperatures and lab standards. EMG muscles tested for upper extremity studies include the deltoid, biceps, triceps, pronator teres, extensor digitorum communis, first dorsal interosseous and abductor pollicis brevis.  EMG muscles tested for lower extremity studies include the vastus lateralis, tibialis anterior, peroneus longus, medial gastrocnemius and extensor digitorum brevis.  Additional muscles tested as needed.  Paraspinal muscles tested as needed.  The complete report includes the data sheets.    Indication: Suspected motor neuron disease  History: 59-year-old white male with progressive weakness in the arms and legs with dysarthria and trouble swallowing.  Suspect ALS      Ht: 179.1 cm  Wt: 72.6 kg: BMI 22.6  HbA1C:   Lab Results   Component Value Date    HGBA1C 5.70 (H) 02/14/2019     TSH:   Lab Results   Component Value Date    TSH 1.360 02/14/2019       Technical summary:  Nerve conduction studies were obtained in the left arm and left leg.  The foot and hand were cold and temperature correction was used if indicated.  Needle examination was obtained on selected muscles of the left arm and left leg.  Please note the study was somewhat technically compromised by the fact that the patient is unable to lie flat due to shortness of air.  The patient was studied in the wheelchair.    Results:  1.  Normal left median sensory study with temperature correction.  2.  Normal left ulnar sensory study.  3.  Normal left radial sensory study.  4.  Prolonged left median motor latency with temperature correction at 4.9 ms with slow conduction velocity in the forearm at 36 m/s as well as in the above elbow segment at 40.7 m/s.  The amplitude was very low at 0.517 mV from her stimulation without significant evidence of conduction block up the arm nor stimulating in the palm.  Shock artifact was a problem with palm stimulation but it appears  the amplitude was consistent with the other amplitudes.  5.  Slow left ulnar motor conduction velocity in the short segment across the elbow at 42.3 m/s with normal velocity below the elbow as well as in the above elbow segment.  Normal distal latency with low amplitude of 3.5 mV from wrist stimulation without evidence of significant conduction block on proximal stimulations.  Prolonged F-wave at 33.9.  6.  Absent left superficial peroneal sensory potential.  7.  Absent left peroneal motor potentials recorded over the extensor digitorum brevis from proximal and distal stimulation sites.  The study was repeated recorded over the tibialis anterior showing very prolonged latency of 11.1 ms with very slow velocity of 23.1 m/s and a very low amplitude of 0.500 mV.  8.  Absent left tibial motor potentials from proximal and distal stimulation sites.  9.  Needle examination of selected muscles in the left arm and leg demonstrate multiple abnormalities.  In the left arm there were fibrillations and positive sharp waves in all muscles tested.  There was an increased number of large motor units with increased firing rates and reduced interference patterns in all muscles tested.  Cervical paraspinals at C7 showed incomplete relaxation.  In the left leg there were fibrillations and positive sharp waves in the vastus lateralis, tibialis anterior, peroneus longus and medial gastrocnemius.  These muscle showed an increased number of large motor units increased firing rate and reduced interference patterns.  The extensor digitorum brevis showed no insertional activity or motor units    Impression:  Complex abnormal study.  There are acute and subacute needle exam changes in nearly all muscles tested.  This is consistent with motor neuron disease versus widespread radiculopathies.  There were nerve conduction abnormalities with slowing of conduction velocity and notably low amplitudes.  There is no clear evidence of motor conduction  block.  The sensory nerves of the left hand were normal.  The superficial peroneal sensory is often more difficult to elicit.  I cannot however exclude a primarily motor neuropathy.  Study results were discussed with the patient.    Edward Padilla M.D.              Dictated utilizing Dragon dictation.

## 2019-03-21 ENCOUNTER — OFFICE VISIT CONVERTED (OUTPATIENT)
Dept: PULMONOLOGY | Facility: CLINIC | Age: 59
End: 2019-03-21
Attending: INTERNAL MEDICINE

## 2019-03-22 ENCOUNTER — TELEPHONE (OUTPATIENT)
Dept: NEUROLOGY | Facility: CLINIC | Age: 59
End: 2019-03-22

## 2019-03-22 NOTE — TELEPHONE ENCOUNTER
We have identified [facility] ([facility] in [facility] to have a sitting/standing MRI scanner.  Phone number [phone].  His MRI of the brain, thoracic and lumbar spine will be ordered there without contrast.  They requested no contrast.    HALIMA

## 2019-03-25 ENCOUNTER — TELEPHONE (OUTPATIENT)
Dept: NEUROLOGY | Facility: CLINIC | Age: 59
End: 2019-03-25

## 2019-03-25 NOTE — TELEPHONE ENCOUNTER
----- Message from John Kearns sent at 3/25/2019  9:05 AM EDT -----  Contact: THIERNO (PT DAUGHTER) [phone]  THIERNO (PT DAUGHTER) CALLING BECAUSE SHE WAS SEEING IF THE LAB RESULTS WERE BACK AND IF RECORDS WERE SENT TO PCP. PLEASE ADVISE AND CALL THIERNO [phone]

## 2019-03-27 DIAGNOSIS — G12.21 AMYOTROPHIC LATERAL SCLEROSIS (ALS) (HCC): ICD-10-CM

## 2019-04-01 ENCOUNTER — TELEPHONE (OUTPATIENT)
Dept: NEUROLOGY | Facility: CLINIC | Age: 59
End: 2019-04-01

## 2019-04-01 NOTE — TELEPHONE ENCOUNTER
4/1/19    Received records from Dr. TAMMI Thomas which included handwritten progress notes dated 4/10/18 and 4/17/18.  The 4/10 note was a new patient note with chief complaint twitching of arms and legs all over body random parts not painful no numbness or tingling.  Cramping in the feet calves and hands and trouble sleeping.  The handwritten note is very poorly legible.    4/10/18 cervical spine x-ray report at [facility] states C5/6 spondylosis.  There was some impingement of the right sided foramen.    4/13/18 MRI cervical spine states normal cervical lordosis and alignment with 1 mm diffuse posterior protrusions at C3/4 and C4/5 with a 2-3 mm spondylitic protrusion at C5/6 eccentric to the right with moderate right and mild left foraminal stenosis.  Normal cervical spinal cord and multilevel mild to moderate uncinate facet joint arthritis.    4/10/18 EMG/NCV showed:  1.  Prolonged median motor latencies bilaterally 4.8 ms left and 4.5 ms right with reduced conduction velocities 30 m/s left and 34 m/s right and low amplitudes at 1.8 mV left and 2.3 mV right.  2.  The left peroneal motor study showed reduced amplitude 1.1 mV and slow conduction velocity 28 m/s.  The right peroneal motor study showed reduced conduction velocity 34 m/s.  3.  Left tibial motor showed reduced amplitude 2.2 mV and conduction velocity 26 m/s with the right tibial motor showing prolonged latency 6.3 ms with reduced velocity 31 m/s and amplitude 2.6 mV.  4.  The left ulnar motor showed reduced velocity 35 m/s elbow to wrist and right ulnar showed prolonged latency 4.5 ms and reduced velocity 43 m/s elbow to wrist.  5.  Prolonged distal latencies 4.6 ms left and 5.0 ms right for the median sensory studies  6.  Prolonged latency sural sensory study on the left 16.1 ms with no response on the right.  7.  The ulnar sensory studies showed prolonged latencies at 4.3 ms on the left and 4.6 ms on the right.    F waves were  normal.  Needle examination showed severe diffuse distal denervation with fasciculations and fibrillations consistent with polyneuropathy/motor neuron disease.  (Muscle table was not included)    Impression: Demyelinating polyneuropathy motor and sensory, severe and bilateral carpal tunnel syndrome severe.    GNS

## 2019-04-08 DIAGNOSIS — G12.21 AMYOTROPHIC LATERAL SCLEROSIS (ALS) (HCC): ICD-10-CM

## 2019-05-12 ENCOUNTER — APPOINTMENT (OUTPATIENT)
Dept: CT IMAGING | Facility: HOSPITAL | Age: 59
End: 2019-05-12

## 2019-05-12 ENCOUNTER — APPOINTMENT (OUTPATIENT)
Dept: GENERAL RADIOLOGY | Facility: HOSPITAL | Age: 59
End: 2019-05-12

## 2019-05-12 ENCOUNTER — HOSPITAL ENCOUNTER (INPATIENT)
Facility: HOSPITAL | Age: 59
LOS: 3 days | Discharge: HOME | End: 2019-05-15
Attending: EMERGENCY MEDICINE | Admitting: INTERNAL MEDICINE

## 2019-05-12 DIAGNOSIS — J18.9 COMMUNITY ACQUIRED PNEUMONIA OF RIGHT UPPER LOBE OF LUNG: ICD-10-CM

## 2019-05-12 DIAGNOSIS — J18.9 COMMUNITY ACQUIRED PNEUMONIA OF LEFT LOWER LOBE OF LUNG: Primary | ICD-10-CM

## 2019-05-12 DIAGNOSIS — B34.8 RHINOVIRUS: ICD-10-CM

## 2019-05-12 LAB
ALBUMIN SERPL-MCNC: 3.9 G/DL (ref 3.5–5.2)
ALBUMIN/GLOB SERPL: 1.1 G/DL
ALP SERPL-CCNC: 75 U/L (ref 39–117)
ALT SERPL W P-5'-P-CCNC: 41 U/L (ref 1–41)
ANION GAP SERPL CALCULATED.3IONS-SCNC: 13.8 MMOL/L
ARTERIAL PATENCY WRIST A: POSITIVE
AST SERPL-CCNC: 22 U/L (ref 1–40)
ATMOSPHERIC PRESS: 747.1 MMHG
B PARAPERT DNA SPEC QL NAA+PROBE: NOT DETECTED
B PERT DNA SPEC QL NAA+PROBE: NOT DETECTED
BASE EXCESS BLDA CALC-SCNC: 3.3 MMOL/L (ref 0–2)
BASOPHILS # BLD AUTO: 0.02 10*3/MM3 (ref 0–0.2)
BASOPHILS NFR BLD AUTO: 0.1 % (ref 0–1.5)
BDY SITE: ABNORMAL
BILIRUB SERPL-MCNC: 0.2 MG/DL (ref 0.2–1.2)
BUN BLD-MCNC: 16 MG/DL (ref 8–23)
BUN/CREAT SERPL: 34 (ref 7–25)
C PNEUM DNA NPH QL NAA+NON-PROBE: NOT DETECTED
CALCIUM SPEC-SCNC: 9.4 MG/DL (ref 8.6–10.5)
CHLORIDE SERPL-SCNC: 98 MMOL/L (ref 98–107)
CO2 SERPL-SCNC: 26.2 MMOL/L (ref 22–29)
CREAT BLD-MCNC: 0.47 MG/DL (ref 0.76–1.27)
D-LACTATE SERPL-SCNC: 0.9 MMOL/L (ref 0.5–2)
DEPRECATED RDW RBC AUTO: 47.9 FL (ref 37–54)
EOSINOPHIL # BLD AUTO: 0.15 10*3/MM3 (ref 0–0.4)
EOSINOPHIL NFR BLD AUTO: 1.1 % (ref 0.3–6.2)
ERYTHROCYTE [DISTWIDTH] IN BLOOD BY AUTOMATED COUNT: 14.1 % (ref 12.3–15.4)
FLUAV H1 2009 PAND RNA NPH QL NAA+PROBE: NOT DETECTED
FLUAV H1 HA GENE NPH QL NAA+PROBE: NOT DETECTED
FLUAV H3 RNA NPH QL NAA+PROBE: NOT DETECTED
FLUAV SUBTYP SPEC NAA+PROBE: NOT DETECTED
FLUBV RNA ISLT QL NAA+PROBE: NOT DETECTED
GAS FLOW AIRWAY: 3 LPM
GFR SERPL CREATININE-BSD FRML MDRD: >150 ML/MIN/1.73
GLOBULIN UR ELPH-MCNC: 3.7 GM/DL
GLUCOSE BLD-MCNC: 108 MG/DL (ref 65–99)
HADV DNA SPEC NAA+PROBE: NOT DETECTED
HCO3 BLDA-SCNC: 29.4 MMOL/L (ref 22–28)
HCOV 229E RNA SPEC QL NAA+PROBE: NOT DETECTED
HCOV HKU1 RNA SPEC QL NAA+PROBE: NOT DETECTED
HCOV NL63 RNA SPEC QL NAA+PROBE: NOT DETECTED
HCOV OC43 RNA SPEC QL NAA+PROBE: NOT DETECTED
HCT VFR BLD AUTO: 48.9 % (ref 37.5–51)
HGB BLD-MCNC: 15.6 G/DL (ref 13–17.7)
HMPV RNA NPH QL NAA+NON-PROBE: NOT DETECTED
HPIV1 RNA SPEC QL NAA+PROBE: NOT DETECTED
HPIV2 RNA SPEC QL NAA+PROBE: NOT DETECTED
HPIV3 RNA NPH QL NAA+PROBE: NOT DETECTED
HPIV4 P GENE NPH QL NAA+PROBE: NOT DETECTED
IMM GRANULOCYTES # BLD AUTO: 0.02 10*3/MM3 (ref 0–0.05)
IMM GRANULOCYTES NFR BLD AUTO: 0.1 % (ref 0–0.5)
LYMPHOCYTES # BLD AUTO: 1.47 10*3/MM3 (ref 0.7–3.1)
LYMPHOCYTES NFR BLD AUTO: 10.6 % (ref 19.6–45.3)
M PNEUMO IGG SER IA-ACNC: NOT DETECTED
MCH RBC QN AUTO: 29.9 PG (ref 26.6–33)
MCHC RBC AUTO-ENTMCNC: 31.9 G/DL (ref 31.5–35.7)
MCV RBC AUTO: 93.7 FL (ref 79–97)
MODALITY: ABNORMAL
MONOCYTES # BLD AUTO: 1.1 10*3/MM3 (ref 0.1–0.9)
MONOCYTES NFR BLD AUTO: 7.9 % (ref 5–12)
NEUTROPHILS # BLD AUTO: 11.11 10*3/MM3 (ref 1.7–7)
NEUTROPHILS NFR BLD AUTO: 80.2 % (ref 42.7–76)
PCO2 BLDA: 47.9 MM HG (ref 35–45)
PH BLDA: 7.39 PH UNITS (ref 7.35–7.45)
PLATELET # BLD AUTO: 288 10*3/MM3 (ref 140–450)
PMV BLD AUTO: 9.8 FL (ref 6–12)
PO2 BLDA: 165.9 MM HG (ref 80–100)
POTASSIUM BLD-SCNC: 4.3 MMOL/L (ref 3.5–5.2)
PROCALCITONIN SERPL-MCNC: 0.39 NG/ML (ref 0.1–0.25)
PROT SERPL-MCNC: 7.6 G/DL (ref 6–8.5)
RBC # BLD AUTO: 5.22 10*6/MM3 (ref 4.14–5.8)
RHINOVIRUS RNA SPEC NAA+PROBE: DETECTED
RSV RNA NPH QL NAA+NON-PROBE: NOT DETECTED
SAO2 % BLDCOA: 99.5 % (ref 92–99)
SET MECH RESP RATE: 28
SODIUM BLD-SCNC: 138 MMOL/L (ref 136–145)
TOTAL RATE: 28 BREATHS/MINUTE
WBC NRBC COR # BLD: 13.87 10*3/MM3 (ref 3.4–10.8)

## 2019-05-12 PROCEDURE — 36600 WITHDRAWAL OF ARTERIAL BLOOD: CPT

## 2019-05-12 PROCEDURE — 87798 DETECT AGENT NOS DNA AMP: CPT | Performed by: EMERGENCY MEDICINE

## 2019-05-12 PROCEDURE — 71045 X-RAY EXAM CHEST 1 VIEW: CPT

## 2019-05-12 PROCEDURE — 85025 COMPLETE CBC W/AUTO DIFF WBC: CPT | Performed by: EMERGENCY MEDICINE

## 2019-05-12 PROCEDURE — 87486 CHLMYD PNEUM DNA AMP PROBE: CPT | Performed by: EMERGENCY MEDICINE

## 2019-05-12 PROCEDURE — 94799 UNLISTED PULMONARY SVC/PX: CPT

## 2019-05-12 PROCEDURE — 87581 M.PNEUMON DNA AMP PROBE: CPT | Performed by: EMERGENCY MEDICINE

## 2019-05-12 PROCEDURE — 87040 BLOOD CULTURE FOR BACTERIA: CPT | Performed by: EMERGENCY MEDICINE

## 2019-05-12 PROCEDURE — 83605 ASSAY OF LACTIC ACID: CPT | Performed by: EMERGENCY MEDICINE

## 2019-05-12 PROCEDURE — 80053 COMPREHEN METABOLIC PANEL: CPT | Performed by: EMERGENCY MEDICINE

## 2019-05-12 PROCEDURE — 87633 RESP VIRUS 12-25 TARGETS: CPT | Performed by: EMERGENCY MEDICINE

## 2019-05-12 PROCEDURE — 84145 PROCALCITONIN (PCT): CPT | Performed by: EMERGENCY MEDICINE

## 2019-05-12 PROCEDURE — 82803 BLOOD GASES ANY COMBINATION: CPT

## 2019-05-12 PROCEDURE — 25010000002 CEFTRIAXONE PER 250 MG: Performed by: EMERGENCY MEDICINE

## 2019-05-12 PROCEDURE — 71250 CT THORAX DX C-: CPT

## 2019-05-12 PROCEDURE — 99284 EMERGENCY DEPT VISIT MOD MDM: CPT

## 2019-05-12 PROCEDURE — 25010000002 AZITHROMYCIN PER 500 MG: Performed by: EMERGENCY MEDICINE

## 2019-05-12 RX ORDER — BUDESONIDE 0.25 MG/2ML
0.25 INHALANT ORAL
Status: DISCONTINUED | OUTPATIENT
Start: 2019-05-13 | End: 2019-05-15 | Stop reason: HOSPADM

## 2019-05-12 RX ORDER — ARFORMOTEROL TARTRATE 15 UG/2ML
15 SOLUTION RESPIRATORY (INHALATION)
Status: DISCONTINUED | OUTPATIENT
Start: 2019-05-13 | End: 2019-05-15 | Stop reason: HOSPADM

## 2019-05-12 RX ORDER — CEFTRIAXONE SODIUM 1 G/50ML
1 INJECTION, SOLUTION INTRAVENOUS EVERY 24 HOURS
Status: DISCONTINUED | OUTPATIENT
Start: 2019-05-13 | End: 2019-05-15 | Stop reason: HOSPADM

## 2019-05-12 RX ORDER — SODIUM CHLORIDE 0.9 % (FLUSH) 0.9 %
10 SYRINGE (ML) INJECTION AS NEEDED
Status: DISCONTINUED | OUTPATIENT
Start: 2019-05-12 | End: 2019-05-15 | Stop reason: HOSPADM

## 2019-05-12 RX ORDER — FUROSEMIDE 40 MG/1
40 TABLET ORAL DAILY
Status: DISCONTINUED | OUTPATIENT
Start: 2019-05-13 | End: 2019-05-15 | Stop reason: HOSPADM

## 2019-05-12 RX ORDER — IPRATROPIUM BROMIDE AND ALBUTEROL SULFATE 2.5; .5 MG/3ML; MG/3ML
3 SOLUTION RESPIRATORY (INHALATION)
Status: DISCONTINUED | OUTPATIENT
Start: 2019-05-12 | End: 2019-05-15 | Stop reason: HOSPADM

## 2019-05-12 RX ORDER — ACETAMINOPHEN 500 MG
1000 TABLET ORAL ONCE
Status: COMPLETED | OUTPATIENT
Start: 2019-05-12 | End: 2019-05-12

## 2019-05-12 RX ORDER — SODIUM CHLORIDE 0.9 % (FLUSH) 0.9 %
3-10 SYRINGE (ML) INJECTION AS NEEDED
Status: DISCONTINUED | OUTPATIENT
Start: 2019-05-12 | End: 2019-05-15 | Stop reason: HOSPADM

## 2019-05-12 RX ORDER — CEFTRIAXONE SODIUM 1 G/50ML
1 INJECTION, SOLUTION INTRAVENOUS ONCE
Status: COMPLETED | OUTPATIENT
Start: 2019-05-12 | End: 2019-05-12

## 2019-05-12 RX ORDER — IPRATROPIUM BROMIDE AND ALBUTEROL SULFATE 2.5; .5 MG/3ML; MG/3ML
3 SOLUTION RESPIRATORY (INHALATION) ONCE
Status: COMPLETED | OUTPATIENT
Start: 2019-05-12 | End: 2019-05-12

## 2019-05-12 RX ORDER — SODIUM CHLORIDE 0.9 % (FLUSH) 0.9 %
3 SYRINGE (ML) INJECTION EVERY 12 HOURS SCHEDULED
Status: DISCONTINUED | OUTPATIENT
Start: 2019-05-13 | End: 2019-05-15 | Stop reason: HOSPADM

## 2019-05-12 RX ADMIN — CEFTRIAXONE SODIUM 1 G: 1 INJECTION, SOLUTION INTRAVENOUS at 21:54

## 2019-05-12 RX ADMIN — IPRATROPIUM BROMIDE AND ALBUTEROL SULFATE 3 ML: 2.5; .5 SOLUTION RESPIRATORY (INHALATION) at 23:57

## 2019-05-12 RX ADMIN — AZITHROMYCIN 500 MG: 500 INJECTION, POWDER, LYOPHILIZED, FOR SOLUTION INTRAVENOUS at 22:41

## 2019-05-12 RX ADMIN — IPRATROPIUM BROMIDE AND ALBUTEROL SULFATE 3 ML: .5; 3 SOLUTION RESPIRATORY (INHALATION) at 21:25

## 2019-05-12 RX ADMIN — ACETAMINOPHEN 1000 MG: 500 TABLET, FILM COATED ORAL at 20:37

## 2019-05-12 RX ADMIN — BUDESONIDE 0.25 MG: 0.25 INHALANT RESPIRATORY (INHALATION) at 23:57

## 2019-05-12 NOTE — ED TRIAGE NOTES
"Pt to ED from home per daughter with reports of SOA.  Daughter states \"I think he has pneumonia again.  He has ALS and generalized body aches and a headache.\"    Pt unable to speak more than 1-2 words at a time r/t SOA.  "

## 2019-05-13 PROCEDURE — 5A09357 ASSISTANCE WITH RESPIRATORY VENTILATION, LESS THAN 24 CONSECUTIVE HOURS, CONTINUOUS POSITIVE AIRWAY PRESSURE: ICD-10-PCS | Performed by: INTERNAL MEDICINE

## 2019-05-13 PROCEDURE — 25010000002 CEFTRIAXONE PER 250 MG: Performed by: INTERNAL MEDICINE

## 2019-05-13 PROCEDURE — 94799 UNLISTED PULMONARY SVC/PX: CPT

## 2019-05-13 PROCEDURE — 92610 EVALUATE SWALLOWING FUNCTION: CPT | Performed by: SPEECH-LANGUAGE PATHOLOGIST

## 2019-05-13 PROCEDURE — 94660 CPAP INITIATION&MGMT: CPT

## 2019-05-13 PROCEDURE — 25010000002 AZITHROMYCIN PER 500 MG: Performed by: INTERNAL MEDICINE

## 2019-05-13 RX ORDER — ACETAMINOPHEN 325 MG/1
650 TABLET ORAL EVERY 6 HOURS PRN
Status: DISCONTINUED | OUTPATIENT
Start: 2019-05-13 | End: 2019-05-15 | Stop reason: HOSPADM

## 2019-05-13 RX ORDER — NICOTINE 21 MG/24HR
1 PATCH, TRANSDERMAL 24 HOURS TRANSDERMAL
Status: DISCONTINUED | OUTPATIENT
Start: 2019-05-13 | End: 2019-05-15 | Stop reason: HOSPADM

## 2019-05-13 RX ADMIN — BUDESONIDE 0.25 MG: 0.25 INHALANT RESPIRATORY (INHALATION) at 07:57

## 2019-05-13 RX ADMIN — IPRATROPIUM BROMIDE AND ALBUTEROL SULFATE 3 ML: 2.5; .5 SOLUTION RESPIRATORY (INHALATION) at 11:35

## 2019-05-13 RX ADMIN — ARFORMOTEROL TARTRATE 15 MCG: 15 SOLUTION RESPIRATORY (INHALATION) at 20:07

## 2019-05-13 RX ADMIN — SODIUM CHLORIDE, PRESERVATIVE FREE 3 ML: 5 INJECTION INTRAVENOUS at 01:06

## 2019-05-13 RX ADMIN — SODIUM CHLORIDE, PRESERVATIVE FREE 3 ML: 5 INJECTION INTRAVENOUS at 08:55

## 2019-05-13 RX ADMIN — NICOTINE 1 PATCH: 14 PATCH, EXTENDED RELEASE TRANSDERMAL at 13:04

## 2019-05-13 RX ADMIN — BUDESONIDE 0.25 MG: 0.25 INHALANT RESPIRATORY (INHALATION) at 20:07

## 2019-05-13 RX ADMIN — IPRATROPIUM BROMIDE AND ALBUTEROL SULFATE 3 ML: 2.5; .5 SOLUTION RESPIRATORY (INHALATION) at 15:26

## 2019-05-13 RX ADMIN — CEFTRIAXONE SODIUM 1 G: 1 INJECTION, SOLUTION INTRAVENOUS at 21:07

## 2019-05-13 RX ADMIN — AZITHROMYCIN MONOHYDRATE 500 MG: 500 INJECTION, POWDER, LYOPHILIZED, FOR SOLUTION INTRAVENOUS at 21:07

## 2019-05-13 RX ADMIN — SODIUM CHLORIDE, PRESERVATIVE FREE 3 ML: 5 INJECTION INTRAVENOUS at 21:08

## 2019-05-13 RX ADMIN — ARFORMOTEROL TARTRATE 15 MCG: 15 SOLUTION RESPIRATORY (INHALATION) at 07:57

## 2019-05-13 RX ADMIN — ACETAMINOPHEN 650 MG: 325 TABLET, FILM COATED ORAL at 16:28

## 2019-05-13 RX ADMIN — FUROSEMIDE 40 MG: 40 TABLET ORAL at 08:54

## 2019-05-13 NOTE — PLAN OF CARE
Problem: Patient Care Overview  Goal: Plan of Care Review  Outcome: Ongoing (interventions implemented as appropriate)   05/13/19 0958   Coping/Psychosocial   Plan of Care Reviewed With patient   Plan of Care Review   Progress improving   OTHER   Outcome Summary Pt came to the floor from ER for PNA; VSS; no c/o pain; on Bipap overnight; NPO, needs speech to evaluate; continue to monitor.     Goal: Individualization and Mutuality  Outcome: Ongoing (interventions implemented as appropriate)    Goal: Discharge Needs Assessment  Outcome: Ongoing (interventions implemented as appropriate)    Goal: Interprofessional Rounds/Family Conf  Outcome: Ongoing (interventions implemented as appropriate)      Problem: Fall Risk (Adult)  Goal: Identify Related Risk Factors and Signs and Symptoms  Outcome: Ongoing (interventions implemented as appropriate)    Goal: Absence of Fall  Outcome: Ongoing (interventions implemented as appropriate)      Problem: Pneumonia (Adult)  Goal: Signs and Symptoms of Listed Potential Problems Will be Absent, Minimized or Managed (Pneumonia)  Outcome: Ongoing (interventions implemented as appropriate)

## 2019-05-13 NOTE — PLAN OF CARE
Problem: Patient Care Overview  Goal: Plan of Care Review   05/13/19 1154   OTHER   Outcome Summary Pt sitting up EOB upon PT arrival. Stood from bed and ambulated to BR with supervision and walker. States he has walker and WC at home, is at baseline mobility. Pt and family deny need for acute PT services. Pt appropriate to continue mobilizing with family while inpatient; planning to DC home with family assist when medically stable. Formal PT eval not warranted; will sign off.

## 2019-05-13 NOTE — PROGRESS NOTES
[facility]  [phone]  Jorge Kirby MD    Subjective:  LOS: 1    Patient took himself off the BiPAP to talk to me.  He sat up by the side of the bed.  He has difficulty with conversation due to weakness.  However able to communicate.  He states he is pending a PEG tube placement on .  He states he takes regular diet despite recommendation of puréed.  He states he eats carefully.  He feels better.  He did not want to put his supplemental oxygen on after taking the BiPAP off.  His saturation remained 90% on room air.  He has a BiPAP at home that he uses consistently.    Objective   Vital Signs past 24hrs    Temp range: Temp (24hrs), Av.8 °F (37.1 °C), Min:97.1 °F (36.2 °C), Max:101.2 °F (38.4 °C)    BP range: BP: (104-124)/(71-90) 105/90  Pulse range: Heart Rate:  [] 93  Resp rate range: Resp:  [20-32] 22    Device (Oxygen Therapy): NPPV/NIVFlow (L/min):  [2] 2  Oxygen range:SpO2:  [90 %-97 %] 92 %   FiO2 (%):  [30 %] 30 %  S RR:  [14] 14  63.5 kg (140 lb); Body mass index is 20.09 kg/m².    Intake/Output Summary (Last 24 hours) at 2019 1032  Last data filed at 2019 0859  Gross per 24 hour   Intake 50 ml   Output 775 ml   Net -725 ml       Physical Exam   Constitutional: He appears well-developed.   HENT:   Head: Normocephalic.   Cardiovascular: Normal rate and regular rhythm.   No murmur heard.  Pulmonary/Chest: He has decreased breath sounds (markedly decreased on left). He has no wheezes. He has no rales.   Abdominal: Soft. Bowel sounds are normal. There is no tenderness.   Musculoskeletal: He exhibits no edema.   Neurological: He is alert.   Nursing note and vitals reviewed.    Results Review:    I have reviewed the laboratory and imaging data since the last note by [facility] physician.  My annotations are noted in assessment and plan.    Medication Review:  I have reviewed the current MAR.  My annotations are noted in assessment and plan.       Plan   PCCM Problems  Acute and  chronic respiratory failure, NIV  CAP  Acute Rhinovirus infection  COPD  ALS  Dysphagia on modified diet - not compliant    Plan of Treatment  He is on noninvasive therapy at home.  Uses a regular BiPAP.  He used a hospital vision BiPAP last night.  We will try him back on his home BiPAP tonight.  He is able to tolerate off of it as well.    Community acquired pneumonia with elevated procalcitonin.  Appropriate to treat with antibiotics.  Switch to oral antibiotics in 1 or 2 days.  Repeat chest x-ray tomorrow    Acute rhinovirus infection with symptomatic treatment.  Continue with nebulizer treatments.    COPD without exacerbation no need systemic steroids.  Remains on Brovana and budesonide nebs.    I spoke with patient's daughter regarding his dysphagia.  He has been following with [facility].  He has not been recommended a puréed a mechanical soft diet.  He just is careful with the consistencies he eats.  He is pending a PEG feeding tube for nutritional purposes.  I told the daughter that I would like to be cautious regarding the consistency of his food and have placed him on a mechanical soft diet here.  We will get speech to look at him as well.  However he will continue his regular follow-up with speech therapy at [facility] and also get his feeding tube at [facility] on June 7.    Jorge Kirby MD  05/13/19  10:32 AM    Part of this note may be an electronic transcription/translation of spoken language to printed text using the Dragon Dictation System.

## 2019-05-13 NOTE — THERAPY EVALUATION
Acute Care - Speech Language Pathology   Swallow Initial Evaluation [facility]     Patient Name: Tino Paul  : 1960  MRN: [id number]  Today's Date: 2019               Admit Date: 2019    Visit Dx:     ICD-10-CM ICD-9-CM   1. Community acquired pneumonia of left lower lobe of lung (CMS/HCC) J18.1 481   2. Rhinovirus B34.8 079.3     Patient Active Problem List   Diagnosis   • Acute respiratory failure with hypoxia (CMS/HCC)   • Respiratory failure with hypoxia (CMS/HCC)   • CAP (community acquired pneumonia)     Past Medical History:   Diagnosis Date   • AAA (abdominal aortic aneurysm) (CMS/HCC)    • ALS (amyotrophic lateral sclerosis) (CMS/HCC)    • COPD (chronic obstructive pulmonary disease) (CMS/[facility])      Past Surgical History:   Procedure Laterality Date   • CHOLECYSTECTOMY          SWALLOW EVALUATION (last 72 hours)      SLP Adult Swallow Evaluation     Row Name 19 1500                   Rehab Evaluation    Document Type  evaluation;therapy note (daily note)  -SA        Subjective Information  no complaints  -SA        Patient Observations  alert;cooperative  -SA        Patient Effort  good  -SA        Symptoms Noted During/After Treatment  none  -SA           General Information    Patient Profile Reviewed  yes  -SA        Pertinent History Of Current Problem  adm w/ pna, h/o ALS  -SA        Current Method of Nutrition  mechanical soft, no mixed consistencies;thin liquids;other (see comments) pt Ind w/ mod of foods; drinks thin soda w/o difficulty  -SA        Prior Level of Function-Communication  motor speech impairment;other (see comments) baseline  -SA        Prior Level of Function-Swallowing  other (see comments) per pt: TORY Curry , asp thin x1 only  -SA        Plans/Goals Discussed with  patient  -SA        Barriers to Rehab  previous functional deficit;medically complex  -SA        Patient's Goals for Discharge  return home  -SA           Pain Assessment    Additional  Documentation  --  -SA           Oral Musculature and Cranial Nerve Assessment    Oral Labial or Buccal Impairment, Detail, Cranial Nerve VII (Facial):  reduced strength bilaterally  -SA        Lingual Impairment, Detail. Cranial Nerves IX, XII (Glossopharyngeal and Hypoglossal)  reduced strength  -SA        Velar Impairment, Detail, Cranial Nerves X, XI (Vagus and Accessory)  reduced velar strength  -SA        Vocal Impairment, Detail. Cranial Nerve X (Vagus)  vocal quality abnormality (see comments);other (see comments) hypernasality  -SA           Clinical Swallow Eval    Clinical Swallow Evaluation Summary  Pt able to manage regular foods by selecting softer foods and using syrup/gravy to moisten.  Takes thin soda by straw.  States is not able to take water.  No overt s/s aspiration with any consistency trialed.  Due to dx, vocal quality, pt is at high risk of aspiration.  Pt states he had VFSS at [facility] in February.  Stated it showed aspiration thin x1 only.  PEG to be placed June 7 for nutrition/meds.  If CXR suspcious for aspiration, rec VFSS.  Continue current diet  -SA           Clinical Impression    SLP Swallowing Diagnosis  oral dysfunction  -SA        Functional Impact  risk of aspiration/pneumonia  -SA        Rehab Potential/Prognosis, Swallowing  re-evaluate goals as necessary  -SA        Swallow Criteria for Skilled Therapeutic Interventions Met  demonstrates skilled criteria  -SA           Recommendations    Therapy Frequency (Swallow)  PRN  -SA        Predicted Duration Therapy Intervention (Days)  until discharge  -SA        SLP Diet Recommendation  mechanical soft with no mixed consistencies;thin liquids  -SA        Recommended Diagnostics  other (see comments) pending results rpt cxr  -SA        Recommended Precautions and Strategies  upright posture during/after eating;small bites of food and sips of liquid  -SA        SLP Rec. for Method of Medication Administration  as tolerated  -SA         Monitor for Signs of Aspiration  notify SLP if any concerns  -        Anticipated Dischage Disposition  home with assist  -SA          User Key  (r) = Recorded By, (t) = Taken By, (c) = Cosigned By    Initials Name Effective Dates    Chayito Gaston MS CCC-SLP 03/07/18 -           EDUCATION  The patient has been educated in the following areas:   Dysphagia (Swallowing Impairment) Oral Care/Hydration Modified Diet Instruction.    SLP Recommendation and Plan  SLP Swallowing Diagnosis: oral dysfunction  SLP Diet Recommendation: mechanical soft with no mixed consistencies, thin liquids  Recommended Precautions and Strategies: upright posture during/after eating, small bites of food and sips of liquid  SLP Rec. for Method of Medication Administration: as tolerated     Monitor for Signs of Aspiration: notify SLP if any concerns  Recommended Diagnostics: other (see comments)(pending results rpt cxr)  Swallow Criteria for Skilled Therapeutic Interventions Met: demonstrates skilled criteria  Anticipated Dischage Disposition: home with assist  Rehab Potential/Prognosis, Swallowing: re-evaluate goals as necessary  Therapy Frequency (Swallow): PRN  Predicted Duration Therapy Intervention (Days): until discharge       Plan of Care Reviewed With: patient  Plan of Care Review  Plan of Care Reviewed With: patient  Outcome Summary: Pt able to manage regular foods by selecting softer foods and using syrup/gravy to moisten when home.  Takes thin soda by straw.  States is not able to take water.  No overt s/s aspiration with any consistency trialed.  Due to dx, vocal quality (indicating reduced vocal cord closure), pt is at risk of aspiration.  Pt states he had VFSS at [facility] in February.  Stated it showed aspiration thin x1 only.  PEG to be placed June 7 for nutrition/meds.  If CXR suspcious for aspiration, rec VFSS.  Continue current diet           Time Calculation:                Chayito Magdaleno MS CCC-SLP  5/13/2019

## 2019-05-13 NOTE — ED PROVIDER NOTES
EMERGENCY DEPARTMENT ENCOUNTER    CHIEF COMPLAINT  Chief Complaint: Shortness of breath  History given by: patient, patient's daughter  History limited by: nothing  Room Number: 23/23  PMD: Quirino Small  Pulmonologist: Dr. Stone MD    HPI:  Pt is a 59 y.o. male with a hx of ALS who presents complaining of SOA that began today. Per daughter, pt has a productive cough with yellow sputum, and generalized myalgias. Daughter is concerned the pt has a pneumonia. Pt reports fatigue, and requests BPAP so he can nap at this time. Pt is a smoker, and wears BPAP at night while he sleeps. Pt has a hx of COPD. There are no other complaints at this time. Temp- (!) 101.2 °F (38.4 °C) (Tympanic) O2 sat- 96%    Duration:  today  Onset: gradual  Timing: constant  Location: pulmonary  Radiation: none  Quality: SOA  Intensity/Severity: moderate  Progression: unchanged  Associated Symptoms: fatigue, cough  Aggravating Factors: none  Alleviating Factors: none  Previous Episodes: Pt has a hx of COPD.  Treatment before arrival: none    PAST MEDICAL HISTORY  Active Ambulatory Problems     Diagnosis Date Noted   • Acute respiratory failure with hypoxia (CMS/[facility]) 07/05/2018   • Respiratory failure with hypoxia (CMS/[facility]) 09/14/2018     Resolved Ambulatory Problems     Diagnosis Date Noted   • No Resolved Ambulatory Problems     Past Medical History:   Diagnosis Date   • AAA (abdominal aortic aneurysm) (CMS/[facility])    • ALS (amyotrophic lateral sclerosis) (CMS/[facility])    • COPD (chronic obstructive pulmonary disease) (CMS/[facility])        PAST SURGICAL HISTORY  Past Surgical History:   Procedure Laterality Date   • CHOLECYSTECTOMY         FAMILY HISTORY  Family History   Problem Relation Age of Onset   • Dementia Mother    • Parkinsonism Mother    • Dementia Paternal Grandmother        SOCIAL HISTORY  Social History     Socioeconomic History   • Marital status:      Spouse name: Not on file   • Number of children: Not on file   • Years of  education: Not on file   • Highest education level: Not on file   Tobacco Use   • Smoking status: Current Every Day Smoker     Packs/day: 1.00     Types: Cigarettes   Substance and Sexual Activity   • Alcohol use: No   • Drug use: No   • Sexual activity: Defer       ALLERGIES  Patient has no known allergies.    REVIEW OF SYSTEMS  Review of Systems   Constitutional: Negative for activity change, appetite change and fever.   HENT: Negative for congestion and sore throat.    Eyes: Negative.    Respiratory: Positive for cough (productive w/ yellow sputum) and shortness of breath.    Cardiovascular: Negative for chest pain and leg swelling.   Gastrointestinal: Negative for abdominal pain, diarrhea and vomiting.   Endocrine: Negative.    Genitourinary: Negative for decreased urine volume and dysuria.   Musculoskeletal: Positive for myalgias (generalized). Negative for neck pain.   Skin: Negative for rash and wound.   Allergic/Immunologic: Negative.    Neurological: Negative for weakness, numbness and headaches.   Hematological: Negative.    Psychiatric/Behavioral: Negative.    All other systems reviewed and are negative.      PHYSICAL EXAM  ED Triage Vitals   Temp Heart Rate Resp BP SpO2   [birth year] 05/12/19 2009 05/1960   (!) 101.2 °F (38.4 °C) 117 28 124/71 90 %      Temp src Heart Rate Source Patient Position BP Location FiO2 (%)   [birth year] 05/12/19 2009 05/12/19 2009 --   Tympanic Monitor Sitting Right arm        Physical Exam   Constitutional: He is oriented to person, place, and time and well-developed, well-nourished, and in no distress. No distress.   HENT:   Head: Normocephalic and atraumatic.   Eyes: EOM are normal. Pupils are equal, round, and reactive to light.   Neck: Normal range of motion. Neck supple.   Cardiovascular: Regular rhythm and normal heart sounds. Tachycardia present. Exam reveals no gallop and no friction rub.   No murmur  heard.  Pulmonary/Chest: Effort normal. No respiratory distress. He has decreased breath sounds in the left upper field, the left middle field and the left lower field. He has no wheezes. He has no rhonchi. He has no rales.   Increased work of breathing.   Abdominal: Soft. There is no tenderness. There is no rebound and no guarding.   Musculoskeletal: Normal range of motion. He exhibits no edema.   Neurological: He is alert and oriented to person, place, and time. He has normal sensation and normal strength.   Skin: Skin is warm and dry.   Psychiatric: Mood and affect normal.   Nursing note and vitals reviewed.      LAB RESULTS  Lab Results (last 24 hours)     Procedure Component Value Units Date/Time    Blood Culture - Blood, Arm, Left [[id number]] Collected:  05/12/19 2031    Specimen:  Blood from Arm, Left Updated:  05/12/19 2035    CBC & Differential [[id number]] Collected:  05/12/19 2032    Specimen:  Blood Updated:  05/12/19 2040    Narrative:       The following orders were created for panel order CBC & Differential.  Procedure                               Abnormality         Status                     ---------                               -----------         ------                     CBC Auto Differential[[id number]]        Abnormal            Final result                 Please view results for these tests on the individual orders.    Comprehensive Metabolic Panel [[id number]]  (Abnormal) Collected:  05/12/19 2032    Specimen:  Blood Updated:  05/12/19 2103     Glucose 108 mg/dL      BUN 16 mg/dL      Creatinine 0.47 mg/dL      Sodium 138 mmol/L      Potassium 4.3 mmol/L      Chloride 98 mmol/L      CO2 26.2 mmol/L      Calcium 9.4 mg/dL      Total Protein 7.6 g/dL      Albumin 3.90 g/dL      ALT (SGPT) 41 U/L      AST (SGOT) 22 U/L      Alkaline Phosphatase 75 U/L      Total Bilirubin 0.2 mg/dL      eGFR Non African Amer >150 mL/min/1.73      Globulin 3.7 gm/dL      A/G Ratio 1.1 g/dL      BUN/Creatinine  "Ratio 34.0     Anion Gap 13.8 mmol/L     Narrative:       GFR Normal >60  Chronic Kidney Disease <60  Kidney Failure <15    Procalcitonin [[id number]]  (Abnormal) Collected:  05/12/19 2032    Specimen:  Blood Updated:  05/12/19 2110     Procalcitonin 0.39 ng/mL     Narrative:       As a Marker for Sepsis (Non-Neonates):   1. <0.5 ng/mL represents a low risk of severe sepsis and/or septic shock.  1. >2 ng/mL represents a high risk of severe sepsis and/or septic shock.    As a Marker for Lower Respiratory Tract Infections that require antibiotic therapy:  PCT on Admission     Antibiotic Therapy             6-12 Hrs later  > 0.5                Strongly Recommended            >0.25 - <0.5         Recommended  0.1 - 0.25           Discouraged                   Remeasure/reassess PCT  <0.1                 Strongly Discouraged          Remeasure/reassess PCT      As 28 day mortality risk marker: \"Change in Procalcitonin Result\" (> 80 % or <=80 %) if Day 0 (or Day 1) and Day 4 values are available. Refer to http://www.[employer]-pct-calculator.com/   Change in PCT <=80 %   A decrease of PCT levels below or equal to 80 % defines a positive change in PCT test result representing a higher risk for 28-day all-cause mortality of patients diagnosed with severe sepsis or septic shock.  Change in PCT > 80 %   A decrease of PCT levels of more than 80 % defines a negative change in PCT result representing a lower risk for 28-day all-cause mortality of patients diagnosed with severe sepsis or septic shock.                CBC Auto Differential [[id number]]  (Abnormal) Collected:  05/12/19 2032    Specimen:  Blood Updated:  05/12/19 2040     WBC 13.87 10*3/mm3      RBC 5.22 10*6/mm3      Hemoglobin 15.6 g/dL      Hematocrit 48.9 %      MCV 93.7 fL      MCH 29.9 pg      MCHC 31.9 g/dL      RDW 14.1 %      RDW-SD 47.9 fl      MPV 9.8 fL      Platelets 288 10*3/mm3      Neutrophil % 80.2 %      Lymphocyte % 10.6 %      Monocyte % 7.9 %      " Eosinophil % 1.1 %      Basophil % 0.1 %      Immature Grans % 0.1 %      Neutrophils, Absolute 11.11 10*3/mm3      Lymphocytes, Absolute 1.47 10*3/mm3      Monocytes, Absolute 1.10 10*3/mm3      Eosinophils, Absolute 0.15 10*3/mm3      Basophils, Absolute 0.02 10*3/mm3      Immature Grans, Absolute 0.02 10*3/mm3     Respiratory Panel, PCR - Swab, Nasopharynx [[id number]]  (Abnormal) Collected:  05/12/19 2043    Specimen:  Swab from Nasopharynx Updated:  05/12/19 2156     ADENOVIRUS, PCR Not Detected     Coronavirus 229E Not Detected     Coronavirus HKU1 Not Detected     Coronavirus NL63 Not Detected     Coronavirus OC43 Not Detected     Human Metapneumovirus Not Detected     Human Rhinovirus/Enterovirus Detected     Influenza B PCR Not Detected     Parainfluenza Virus 1 Not Detected     Parainfluenza Virus 2 Not Detected     Parainfluenza Virus 3 Not Detected     Parainfluenza Virus 4 Not Detected     Bordetella pertussis pcr Not Detected     Influenza A H1 2009 PCR Not Detected     Chlamydophila pneumoniae PCR Not Detected     Mycoplasma pneumo by PCR Not Detected     Influenza A PCR Not Detected     Influenza A H3 Not Detected     Influenza A H1 Not Detected     RSV, PCR Not Detected     Bordetella parapertussis PCR Not Detected    Lactic Acid, Plasma [[id number]]  (Normal) Collected:  05/12/19 2048    Specimen:  Blood Updated:  05/12/19 2109     Lactate 0.9 mmol/L     Blood Culture - Blood, Blood, Venous Line [[id number]] Collected:  05/12/19 2048    Specimen:  Blood, Venous Line Updated:  05/12/19 2051    Blood Gas, Arterial [[id number]]  (Abnormal) Collected:  05/12/19 2120    Specimen:  Arterial Blood Updated:  05/12/19 2125     Site Arterial: right radial     Timmy's Test Positive     pH, Arterial 7.395 pH units      pCO2, Arterial 47.9 mm Hg      pO2, Arterial 165.9 mm Hg      HCO3, Arterial 29.4 mmol/L      Base Excess, Arterial 3.3 mmol/L      O2 Saturation Calculated 99.5 %      Barometric Pressure for Blood  Gas 747.1 mmHg      Modality Cannula     Flow Rate 3 lpm      Set Mech Resp Rate 28     Rate 28 Breaths/minute           I ordered the above labs and reviewed the results    RADIOLOGY  XR Chest 1 View   Final Result   Some improvement in suspected left lung base atelectasis and   effusion           This report was finalized on 5/12/2019 8:52 PM by Curry Gould M.D.          CT Chest Without Contrast    (Results Pending)        I ordered the above noted radiological studies. Interpreted by radiologist. Reviewed by me in PACS.       PROCEDURES  Procedures      PROGRESS AND CONSULTS  ED Course as of May 12 2205   Sun May 12, 2019   2147 Procalcitonin: (!) 0.39 [DP]   2147 Procalcitonin: (!) 0.39 [DP]   2147 Procalcitonin: (!) 0.39 [DP]      ED Course User Index  [DP] Reyes Loco MD     2025  Ordered lab work and CXR for evaluation. Ordered acetaminophen and IVF for treatment.    2033  Ordered breathing treatment.    2145  Initial encounter. Pt reports fatigue. BP- 124/71 HR- 120 Temp- (!) 101.2 °F (38.4 °C) (Tympanic) O2 sat- 96% Reviewed pt's lab and imaging results, including pneumonia seen. Discussed the plan to consult pulmonology. Will order Rocephin and Zithromax for treatment. Pt understands and agrees with the plan, all questions answered.    2148  Placed call to pulmonology for consult.    2152  Reviewed pt's case with Dr. Luke, pulmonology, who agrees with the plan admit the patient for further management and treatment.     2156   Rechecked the patient who is resting comfortably and in NAD. Patient is stable. BP- 124/71 HR- 120 Temp- (!) 101.2 °F (38.4 °C) (Tympanic) O2 sat- 96%. Informed the patient of consult with pulmonology, and rhinovirus detected. Discussed the plan for admission for further evaluation and management. Pt understands and agrees with the plan, all questions answered.    MEDICAL DECISION MAKING  Results were reviewed/discussed with the patient and they were also made aware of  online access. Pt also made aware that some labs, such as cultures, will not be resulted during ER visit and follow up with PMD is necessary.     MDM  Number of Diagnoses or Management Options  Community acquired pneumonia of left lower lobe of lung (CMS/HCC):   Rhinovirus:      Amount and/or Complexity of Data Reviewed  Clinical lab tests: ordered and reviewed (Procalcitonin: 0.39; human rhinovirus: detected)  Tests in the radiology section of CPT®: reviewed and ordered (CXR: Some improvement in suspected left lung base atelectasis and effusion.)  Decide to obtain previous medical records or to obtain history from someone other than the patient: yes  Obtain history from someone other than the patient: (Patient's daughter)  Review and summarize past medical records: yes  Discuss the patient with other providers: yes (Dr. Marly MD)  Independent visualization of images, tracings, or specimens: yes (Dr. Luis Fernando MD)    Patient Progress  Patient progress: stable         DIAGNOSIS  Final diagnoses:   Community acquired pneumonia of left lower lobe of lung (CMS/HCC)   Rhinovirus       DISPOSITION  ADMISSION    Discussed treatment plan and reason for admission with pt/family and admitting physician.  Pt/family voiced understanding of the plan for admission for further testing/treatment as needed.         Latest Documented Vital Signs:  As of 10:05 PM  BP- 124/71 HR- 120 Temp- (!) 101.2 °F (38.4 °C) (Tympanic) O2 sat- 96%    --  Documentation assistance provided by beto Tucker for Dr. Reyes Loco MD.  Information recorded by the scribe was done at my direction and has been verified and validated by me.       Denise Tucker  05/12/19 8053       Reyes Loco MD  05/12/19 5621

## 2019-05-13 NOTE — PLAN OF CARE
Problem: Patient Care Overview  Goal: Plan of Care Review  Outcome: Ongoing (interventions implemented as appropriate)   05/13/19 0385   Coping/Psychosocial   Plan of Care Reviewed With patient   OTHER   Outcome Summary Pt able to manage regular foods by selecting softer foods and using syrup/gravy to moisten when home. Takes thin soda by straw. States is not able to take water. No overt s/s aspiration with any consistency trialed. Due to dx, vocal quality (indicating reduced vocal cord closure), pt is at high risk of aspiration. Pt states he had VFSS at [facility] in February. Stated it showed aspiration thin x1 only. PEG to be placed June 7 for nutrition/meds. If CXR suspcious for aspiration, rec VFSS. Continue current diet

## 2019-05-13 NOTE — CONSULTS
"Adult Nutrition  Assessment/PES    Patient Name:  Tino Paul  YOB: 1960  MRN: [id number]  Admit Date:  5/12/2019    Assessment Date:  5/13/2019    Comments:  Consult: Chewing/swallowing deficits. Daughter reports pt on pureed at home. Cleared for Mech. Soft/NMC today. Wt index shows wt 160# on 2/14/19 with CBW of 140#. No recommendations at present. Will begin supplement if intake 50% or less. Noted PEG placement scheduled June 7th for \"nutrition and medications\". Will continue to follow.     Reason for Assessment     Row Name 05/13/19 1544          Reason for Assessment    Reason For Assessment  nurse/nurse practitioner consult     Diagnosis  pulmonary disease;neurologic conditions;cancer diagnosis/related complications PNA with hx of COPD, CRF, AAA, and ALS     Identified At Risk by Screening Criteria  difficulty chewing/swallowing;unintentional loss of 10 lbs or more in the past 2 mos         Nutrition/Diet History     Row Name 05/13/19 5574          Nutrition/Diet History    Typical Food/Fluid Intake  NPO on admission, daughter reported pt on pureed at home. Cleared for [facility]. Soft/NMC today. Wt index shows 160# on 2/14 with CBW of 140#.      Factors Affecting Nutritional Intake  chewing difficulties/inability to chew food;difficulty/impaired swallowing;compromised airway;impaired cognitive status/motor control         Anthropometrics     Row Name 05/13/19 6057          Anthropometrics    Height Method  measured     Height  177.8 cm (70\")     Current Weight Method  measured     Weight  63.5 kg (139 lb 15.9 oz)        Ideal Body Weight (IBW)    Ideal Body Weight (IBW) (kg)  76.48     % Ideal Body Weight  83.03     % of Ideal Body Weight Assessment  80-90%: mild deficit        Usual Body Weight (UBW)    Usual Body Weight  72.6 kg (160 lb)     % Usual Body Weight  87.49     % of Usual Body Weight Assessment  85-95% - mild deficit     Weight Loss  unintentional     Weight Loss Time Frame  Wt index " "shows wt at 160# on 2/14/19 with CBW of 140#.         Body Mass Index (BMI)    BMI (kg/m2)  20.13     BMI Assessment  BMI 18.5-24.9: normal         Labs/Tests/Procedures/Meds     Row Name 05/13/19 1557          Labs/Procedures/Meds    Lab Results Reviewed  reviewed, pertinent     Lab Results Comments  Glu, Cr, Procalcitonin        Diagnostic Tests/Procedures    Diagnostic Test/Procedure Reviewed  reviewed, pertinent     Diagnostic Test/Procedures Comments  CT, Xray        Medications    Pertinent Medications Reviewed  reviewed, pertinent     Pertinent Medications Comments  IV azithromycin, IV Rocephin, Lasix         Physical Findings     Row Name 05/13/19 1558          Physical Findings    Overall Physical Appearance  generalized wasting;on oxygen therapy         Estimated/Assessed Needs     Row Name 05/13/19 1600 05/13/19 1555       Calculation Measurements    Weight Used For Calculations  63.5 kg (139 lb 15.9 oz)  --    Height  --  177.8 cm (70\")       Estimated/Assessed Needs    Additional Documentation  Fluid Requirements (Group);Calorie Requirements (Group);Protein Requirements (Group)  --       Calorie Requirements    Weight Used For Calorie Calculations  63.5 kg (139 lb 15.9 oz)  --    Estimated Calorie Requirement (kcal/day)  1764  --    Estimated Calorie Need Method  [place] Je[place]  --    Estimated Calorie Requirement Comment  AF 1.3  --       KCAL/KG                            25 Kcal/Kg (kcal)  1587.5  --    30 Kcal/Kg (kcal)  1905  --                               [place] Jeor Equation    RMR ([place] Jeor Equation)  1451.25  --       Protein Requirements    Weight Used For Protein Calculations  63.5 kg (139 lb 15.9 oz)  --    Est Protein Requirement Amount (gms/kg)  1.2 gm protein  --    Estimated Protein Requirements (gms/day)  76.2  --       Fluid Requirements    Estimated Fluid Requirements (mL/day)  1905  --    Estimated Fluid Requirement Method  RDA Method  --    RDA Method (mL)  1905  -- " "    Nutrition Prescription Ordered     Row Name 05/13/19 1602          Nutrition Prescription PO    Current PO Diet  Dysphagia     Dysphagia Level  4  Mechanical soft no mixed consistencies     Fluid Consistency  Thin         Evaluation of Received Nutrient/Fluid Intake     Row Name 05/13/19 1600 05/13/19 1555       Calculation Measurements    Weight Used For Calculations  63.5 kg (139 lb 15.9 oz)  --    Height  --  177.8 cm (70\")        Evaluation of Prescribed Nutrient/Fluid Intake     Row Name 05/13/19 1600 05/13/19 1555       Calculation Measurements    Weight Used For Calculations  63.5 kg (139 lb 15.9 oz)  --    Height  --  177.8 cm (70\")        Problem/Interventions:  Problem 1     Row Name 05/13/19 1603          Nutrition Diagnoses Problem 1    Problem 1  Unintended Weight Loss     Etiology (related to)  Medical Diagnosis     Pulmonary/Critical Care  Chronic respiratory failure;COPD;biPAP;Pneumonia     Signs/Symptoms (evidenced by)  Unintended Weight Change     Unintended Weight Change  Loss     Number of Pounds Lost  20     Weight loss time period  3 months         Intervention Goal     Row Name 05/13/19 1604          Intervention Goal    General  Maintain nutrition;Improved nutrition related lab(s);Reduce/improve symptoms;Meet nutritional needs for age/condition;Disease management/therapy     PO  Increase intake;Meet estimated needs;PO intake (%)     PO Intake %  100 %     Weight  Maintain weight         Nutrition Intervention     Row Name 05/13/19 1604          Nutrition Intervention    RD/Tech Action  Encourage intake;Follow Tx progress;Care plan reviewd         Education/Evaluation     Row Name 05/13/19 1604          Education    Education  Will Instruct as appropriate        Monitor/Evaluation    Monitor  Per protocol;PO intake;Pertinent labs;Weight;Skin status;Symptoms     Education Follow-up  Reinforce PRN           Electronically signed by:  Colleen Galvan MS,RD,LD  05/13/19 4:05 PM  "

## 2019-05-13 NOTE — H&P
"Group: [facility]         H/p  NOTE    Patient Identification:  Tino Paul  59 y.o.  male  1960  [id number]                CC: Shortness of breath    History of Present Illness:  Very pleasant 59-year-old gentleman with known history of ALS follows my partner Dr. Mccauley.  He also has history of COPD and chronic respiratory failure.  Patient per daughter has been sick for the last 1 week with cough productive of yellowish sputum.  He was noted to have a fever of 101.2 in the emergency room today.  There is no history of aspiration reported by the daughter.  He is on a puréed diet.  Currently patient on BiPAP unable to give any meaningful history as such      Review of Systems  Limited with patient on BiPAP  As above  Past Medical History:  Past Medical History:   Diagnosis Date   • AAA (abdominal aortic aneurysm) (CMS/[facility])    • ALS (amyotrophic lateral sclerosis) (CMS/[facility])    • COPD (chronic obstructive pulmonary disease) (CMS/[facility])        Past Surgical History:  Past Surgical History:   Procedure Laterality Date   • CHOLECYSTECTOMY          Home Meds:    (Not in a hospital admission)    Allergies:  No Known Allergies    Social History:   Social History     Socioeconomic History   • Marital status:      Spouse name: Not on file   • Number of children: Not on file   • Years of education: Not on file   • Highest education level: Not on file   Tobacco Use   • Smoking status: Current Every Day Smoker     Packs/day: 1.00     Types: Cigarettes   Substance and Sexual Activity   • Alcohol use: No   • Drug use: No   • Sexual activity: Defer       Family History:  Family History   Problem Relation Age of Onset   • Dementia Mother    • Parkinsonism Mother    • Dementia Paternal Grandmother        Physical Exam:  /71 (BP Location: Right arm, Patient Position: Sitting)   Pulse 120   Temp (!) 101.2 °F (38.4 °C) (Tympanic)   Resp 28   Ht 177.8 cm (70\")   Wt 68.1 kg (150 lb 1 oz)   SpO2 96%   " BMI 21.53 kg/m²  Body mass index is 21.53 kg/m². 96% 68.1 kg (150 lb 1 oz)  Physical Exam  Middle-aged gentleman currently on BiPAP tolerating well  Earlier had increase in work of breathing improved with BiPAP  Neck is supple no bruit no adenopathy  Chest diminished breath sounds on the left base with occasional crackles  CVs regular rate rhythm no murmurs  Abdomen is soft nontender no masses felt  Extremities no edema  CNS no deficits  No skin rashes    LABS:  Lab Results   Component Value Date    CALCIUM 9.4 05/12/2019    PHOS 3.5 09/14/2018     Results from last 7 days   Lab Units 05/12/19 2032   SODIUM mmol/L 138   POTASSIUM mmol/L 4.3   CHLORIDE mmol/L 98   CO2 mmol/L 26.2   BUN mg/dL 16   CREATININE mg/dL 0.47*   GLUCOSE mg/dL 108*   CALCIUM mg/dL 9.4   WBC 10*3/mm3 13.87*   HEMOGLOBIN g/dL 15.6   PLATELETS 10*3/mm3 288   ALT (SGPT) U/L 41   AST (SGOT) U/L 22   PROCALCITONIN ng/mL 0.39*     Lab Results   Component Value Date    CKTOTAL 304 (H) 02/14/2019    TROPONINT 0.045 (H) 09/20/2018             Results from last 7 days   Lab Units 05/12/19 2048 05/12/19 2032   PROCALCITONIN ng/mL  --  0.39*   LACTATE mmol/L 0.9  --      Results from last 7 days   Lab Units 05/12/19 2120   PH, ARTERIAL pH units 7.395   PCO2, ARTERIAL mm Hg 47.9*   PO2 ART mm Hg 165.9*   FLOW RATE lpm 3   MODALITY  Cannula   O2 SATURATION CALC % 99.5*     Results from last 7 days   Lab Units 05/12/19 2043   ADENOVIRUS DETECTION BY PCR  Not Detected   CORONAVIRUS 229E  Not Detected   CORONAVIRUS HKU1  Not Detected   CORONAVIRUS NL63  Not Detected   CORONAVIRUS OC43  Not Detected   HUMAN METAPNEUMOVIRUS  Not Detected   HUMAN RHINOVIRUS/ENTEROVIRUS  Detected*   INFLUENZA B PCR  Not Detected   PARAINFLUENZA 1  Not Detected   PARAINFLUENZA VIRUS 2  Not Detected   PARAINFLUENZA VIRUS 3  Not Detected   PARAINFLUENZA VIRUS 4  Not Detected   BORDETELLA PERTUSSIS PCR  Not Detected   CHLAMYDOPHILA PNEUMONIAE PCR  Not Detected   MYCOPLAMA PNEUMO  PCR  Not Detected   INFLUENZA A PCR  Not Detected   INFLUENZA A H3  Not Detected   INFLUENZA A H1  Not Detected   RSV, PCR  Not Detected             Lab Results   Component Value Date    TSH 1.360 02/14/2019     Estimated Creatinine Clearance: 161 mL/min (A) (by C-G formula based on SCr of 0.47 mg/dL (L)).         Imaging: I personally visualized the images of scans/x-rays performed within last 3 days.      Assessment:  Community-acquired pneumonia  Chronic respiratory failure  ALS  COPD no exacerbation  Human rhinovirus infection  Probably hypertension      Recommendations:  We will treat patient with community acquired pneumonia protocol Rocephin and Zithromax  Bronchodilators will be initiated  BiPAP to be continued for work of breathing  Resume home bronchodilators  Symptomatic treatment for rhinovirus infection  Will likely need a swallow evaluation once off BiPAP  Discussed plan of care with the patient's daughter            Ashlee Luke MD  5/12/2019  10:22 PM      Much of this encounter note is an electronic transcription/translation of spoken language to printed text using Dragon Software.

## 2019-05-13 NOTE — PLAN OF CARE
Problem: Patient Care Overview  Goal: Plan of Care Review  Outcome: Ongoing (interventions implemented as appropriate)   05/13/19 2556   Coping/Psychosocial   Plan of Care Reviewed With patient;family   Plan of Care Review   Progress improving   OTHER   Outcome Summary VSS. HR elevated with mobility. Now on RA during the day and cpap while sleeping. CXR in am and possible discharge tomorrow. Continue to monitor.      Goal: Individualization and Mutuality  Outcome: Ongoing (interventions implemented as appropriate)    Goal: Discharge Needs Assessment  Outcome: Ongoing (interventions implemented as appropriate)    Goal: Interprofessional Rounds/Family Conf  Outcome: Ongoing (interventions implemented as appropriate)      Problem: Fall Risk (Adult)  Goal: Identify Related Risk Factors and Signs and Symptoms  Outcome: Ongoing (interventions implemented as appropriate)    Goal: Absence of Fall  Outcome: Ongoing (interventions implemented as appropriate)      Problem: Pneumonia (Adult)  Goal: Signs and Symptoms of Listed Potential Problems Will be Absent, Minimized or Managed (Pneumonia)  Outcome: Ongoing (interventions implemented as appropriate)

## 2019-05-13 NOTE — PROGRESS NOTES
Discharge Planning Assessment  [facility]     Patient Name: Tino Paul  MRN: [id number]  Today's Date: 5/13/2019    Admit Date: 5/12/2019    Discharge Needs Assessment     Row Name 05/13/19 6942       Living Environment    Lives With  child(heydi), dependent    Name(s) of Who Lives With Patient  Yenny Childers (daughter) [phone]    Current Living Arrangements  home/apartment/condo    Primary Care Provided by  child(heydi)    Provides Primary Care For  no one, unable/limited ability to care for self    Family Caregiver if Needed  child(heydi), adult    Family Caregiver Names  Yenny Childers (daughter) [phone]    Quality of Family Relationships  involved;supportive    Able to Return to Prior Arrangements  yes       Resource/Environmental Concerns    Resource/Environmental Concerns  none    Transportation Concerns  car, none       Transition Planning    Patient/Family Anticipates Transition to  home with family    Patient/Family Anticipated Services at Transition  none    Transportation Anticipated  family or friend will provide       Discharge Needs Assessment    Readmission Within the Last 30 Days  no previous admission in last 30 days    Concerns to be Addressed  discharge planning    Equipment Currently Used at Home  walker, standard;other (see comments);bipap/cpap Speech Communicator Loaned to him through ALS [facility]    Anticipated Changes Related to Illness  none    Equipment Needed After Discharge  none        Discharge Plan     Row Name 05/13/19 1048       Plan    Plan  Home with daughter    Patient/Family in Agreement with Plan  yes    Plan Comments  Met with pt at bedside. Explained roll of . Face sheet and pharmacy verified. Pt asked me to call his daughter Yenny to get information.  Called and spoke to Yenyn Childers (daughter) [phone].   Pt lives with Yenny Childers (daughter) [phone] and has 2 steps to enter home.  DME equipment:  Walker and CPAP.  Has never used HH but has  "been to [facility] for rehab. Daughter states pt. has just started rehab at [facility] [place] as an outpatient.  Patient does not have insurance.  Daughter stated that once he started getting his SSI checks it put him over the allowed dollar amount for Medicaid.  They thought that once he lost his Medicaid that it would \"rollover\" to Medicare.  Daughter states they have signed up for Medicare and since pt. has ALS they were told there would be no waiting period.  She states that his Medicare should be rolled back to start March 19. 2019.  She is unsure of how to get the Medicare in place for this admission.  Told her I would follow up on this tomorrow.  PCP: Dr Quirino Small. Uses [facility] on [address] in [facility].  At discharge pt will be transported by family. Daughter denied any discharge needs.  Pt states he may be discharged tomorrow.   William Barbosa RN-BC         Destination      No service coordination in this encounter.      Durable Medical Equipment      No service coordination in this encounter.      Dialysis/Infusion      No service coordination in this encounter.      Home Medical Care      No service coordination in this encounter.      Therapy      No service coordination in this encounter.      Community Resources      No service coordination in this encounter.          Demographic Summary     Row Name 05/13/19 1731       General Information    Admission Type  inpatient    Arrived From  emergency department    Reason for Consult  discharge planning    Preferred Language  English       Contact Information    Permission Granted to Share Info With  family/designee        Functional Status     Row Name 05/13/19 1732       Employment/    Employment Status  disabled    Row Name 05/13/19 1731       Functional Status    Usual Activity Tolerance  fair    Current Activity Tolerance  fair       Functional Status, IADL    Medications  assistive person    Meal Preparation  assistive person    Housekeeping  " assistive person    Laundry  assistive person    Shopping  assistive person       Mental Status    General Appearance WDL  WDL       Mental Status Summary    Recent Changes in Mental Status/Cognitive Functioning  no changes        Psychosocial    No documentation.       Abuse/Neglect    No documentation.       Legal    No documentation.       Substance Abuse    No documentation.       Patient Forms    No documentation.           William Barbosa RN

## 2019-05-14 ENCOUNTER — APPOINTMENT (OUTPATIENT)
Dept: GENERAL RADIOLOGY | Facility: HOSPITAL | Age: 59
End: 2019-05-14

## 2019-05-14 LAB
ANION GAP SERPL CALCULATED.3IONS-SCNC: 10.2 MMOL/L
BUN BLD-MCNC: 15 MG/DL (ref 8–23)
BUN/CREAT SERPL: 32.6 (ref 7–25)
CALCIUM SPEC-SCNC: 9.3 MG/DL (ref 8.6–10.5)
CHLORIDE SERPL-SCNC: 95 MMOL/L (ref 98–107)
CO2 SERPL-SCNC: 31.8 MMOL/L (ref 22–29)
CREAT BLD-MCNC: 0.46 MG/DL (ref 0.76–1.27)
DEPRECATED RDW RBC AUTO: 45.8 FL (ref 37–54)
ERYTHROCYTE [DISTWIDTH] IN BLOOD BY AUTOMATED COUNT: 13.4 % (ref 12.3–15.4)
GFR SERPL CREATININE-BSD FRML MDRD: >150 ML/MIN/1.73
GLUCOSE BLD-MCNC: 103 MG/DL (ref 65–99)
HCT VFR BLD AUTO: 47.5 % (ref 37.5–51)
HGB BLD-MCNC: 14.8 G/DL (ref 13–17.7)
MCH RBC QN AUTO: 28.6 PG (ref 26.6–33)
MCHC RBC AUTO-ENTMCNC: 31.2 G/DL (ref 31.5–35.7)
MCV RBC AUTO: 91.9 FL (ref 79–97)
PLATELET # BLD AUTO: 309 10*3/MM3 (ref 140–450)
PMV BLD AUTO: 9.5 FL (ref 6–12)
POTASSIUM BLD-SCNC: 3.9 MMOL/L (ref 3.5–5.2)
RBC # BLD AUTO: 5.17 10*6/MM3 (ref 4.14–5.8)
SODIUM BLD-SCNC: 137 MMOL/L (ref 136–145)
WBC NRBC COR # BLD: 10.37 10*3/MM3 (ref 3.4–10.8)

## 2019-05-14 PROCEDURE — 25010000002 CEFTRIAXONE PER 250 MG: Performed by: INTERNAL MEDICINE

## 2019-05-14 PROCEDURE — 25010000002 AZITHROMYCIN PER 500 MG: Performed by: INTERNAL MEDICINE

## 2019-05-14 PROCEDURE — 85027 COMPLETE CBC AUTOMATED: CPT | Performed by: INTERNAL MEDICINE

## 2019-05-14 PROCEDURE — 94799 UNLISTED PULMONARY SVC/PX: CPT

## 2019-05-14 PROCEDURE — 80048 BASIC METABOLIC PNL TOTAL CA: CPT | Performed by: INTERNAL MEDICINE

## 2019-05-14 PROCEDURE — 94640 AIRWAY INHALATION TREATMENT: CPT

## 2019-05-14 PROCEDURE — 71045 X-RAY EXAM CHEST 1 VIEW: CPT

## 2019-05-14 RX ORDER — PANTOPRAZOLE SODIUM 40 MG/1
40 TABLET, DELAYED RELEASE ORAL
Status: DISCONTINUED | OUTPATIENT
Start: 2019-05-14 | End: 2019-05-15 | Stop reason: HOSPADM

## 2019-05-14 RX ADMIN — ACETAMINOPHEN 650 MG: 325 TABLET, FILM COATED ORAL at 21:51

## 2019-05-14 RX ADMIN — BUDESONIDE 0.25 MG: 0.25 INHALANT RESPIRATORY (INHALATION) at 20:40

## 2019-05-14 RX ADMIN — AZITHROMYCIN MONOHYDRATE 500 MG: 500 INJECTION, POWDER, LYOPHILIZED, FOR SOLUTION INTRAVENOUS at 21:34

## 2019-05-14 RX ADMIN — CEFTRIAXONE SODIUM 1 G: 1 INJECTION, SOLUTION INTRAVENOUS at 20:50

## 2019-05-14 RX ADMIN — PANTOPRAZOLE SODIUM 40 MG: 40 TABLET, DELAYED RELEASE ORAL at 10:47

## 2019-05-14 RX ADMIN — SODIUM CHLORIDE, PRESERVATIVE FREE 3 ML: 5 INJECTION INTRAVENOUS at 09:01

## 2019-05-14 RX ADMIN — FUROSEMIDE 40 MG: 40 TABLET ORAL at 09:01

## 2019-05-14 RX ADMIN — IPRATROPIUM BROMIDE AND ALBUTEROL SULFATE 3 ML: 2.5; .5 SOLUTION RESPIRATORY (INHALATION) at 02:12

## 2019-05-14 RX ADMIN — SODIUM CHLORIDE, PRESERVATIVE FREE 3 ML: 5 INJECTION INTRAVENOUS at 21:34

## 2019-05-14 RX ADMIN — ARFORMOTEROL TARTRATE 15 MCG: 15 SOLUTION RESPIRATORY (INHALATION) at 20:40

## 2019-05-14 RX ADMIN — BUDESONIDE 0.25 MG: 0.25 INHALANT RESPIRATORY (INHALATION) at 07:32

## 2019-05-14 RX ADMIN — IPRATROPIUM BROMIDE AND ALBUTEROL SULFATE 3 ML: 2.5; .5 SOLUTION RESPIRATORY (INHALATION) at 16:09

## 2019-05-14 RX ADMIN — ARFORMOTEROL TARTRATE 15 MCG: 15 SOLUTION RESPIRATORY (INHALATION) at 07:32

## 2019-05-14 RX ADMIN — NICOTINE 1 PATCH: 14 PATCH, EXTENDED RELEASE TRANSDERMAL at 09:02

## 2019-05-14 RX ADMIN — IPRATROPIUM BROMIDE AND ALBUTEROL SULFATE 3 ML: 2.5; .5 SOLUTION RESPIRATORY (INHALATION) at 11:47

## 2019-05-14 NOTE — PLAN OF CARE
Problem: Patient Care Overview  Goal: Plan of Care Review  Outcome: Ongoing (interventions implemented as appropriate)   05/14/19 9776   Coping/Psychosocial   Plan of Care Reviewed With patient   Plan of Care Review   Progress improving   OTHER   Outcome Summary VSS; no c/o pain; pt on hospital Bipap most of the night and switched to home Bipap; repeat CXR this morning; continue to monitor.     Goal: Individualization and Mutuality  Outcome: Ongoing (interventions implemented as appropriate)    Goal: Discharge Needs Assessment  Outcome: Ongoing (interventions implemented as appropriate)    Goal: Interprofessional Rounds/Family Conf  Outcome: Ongoing (interventions implemented as appropriate)      Problem: Fall Risk (Adult)  Goal: Identify Related Risk Factors and Signs and Symptoms  Outcome: Ongoing (interventions implemented as appropriate)    Goal: Absence of Fall  Outcome: Ongoing (interventions implemented as appropriate)      Problem: Pneumonia (Adult)  Goal: Signs and Symptoms of Listed Potential Problems Will be Absent, Minimized or Managed (Pneumonia)  Outcome: Ongoing (interventions implemented as appropriate)

## 2019-05-14 NOTE — PLAN OF CARE
Problem: Patient Care Overview  Goal: Plan of Care Review  Outcome: Ongoing (interventions implemented as appropriate)   05/14/19 6443   Coping/Psychosocial   Plan of Care Reviewed With patient   Plan of Care Review   Progress improving   OTHER   Outcome Summary Increased RR and HR. Also feeling more SOA and increased secretions. EPAP and flutter added. Was able to get yellow secretions up after. Wanting to go home. Continue to monitor.      Goal: Individualization and Mutuality  Outcome: Ongoing (interventions implemented as appropriate)    Goal: Discharge Needs Assessment  Outcome: Ongoing (interventions implemented as appropriate)    Goal: Interprofessional Rounds/Family Conf  Outcome: Ongoing (interventions implemented as appropriate)      Problem: Fall Risk (Adult)  Goal: Identify Related Risk Factors and Signs and Symptoms  Outcome: Outcome(s) achieved Date Met: 05/14/19    Goal: Absence of Fall  Outcome: Ongoing (interventions implemented as appropriate)      Problem: Pneumonia (Adult)  Goal: Signs and Symptoms of Listed Potential Problems Will be Absent, Minimized or Managed (Pneumonia)  Outcome: Ongoing (interventions implemented as appropriate)

## 2019-05-14 NOTE — PROGRESS NOTES
[facility]  [phone]  Jorge Kirby MD    Subjective:  LOS: 2    Sitting by the side of the bed.  Currently on a BiPAP.  Taken off the BiPAP and talk to me.  States he feels weak.  His cough is fairly ineffective sounding.  This morning he had a lot of nasal drainage.  He has had a lot of difficulty expectorating his secretions.  He is however able to converse with me with a very weak voice.  He continues to use a hospital vision machine and is not using his home BiPAP.    Objective   Vital Signs past 24hrs    Temp range: Temp (24hrs), Av.5 °F (36.4 °C), Min:97.2 °F (36.2 °C), Max:98 °F (36.7 °C)    BP range: BP: (100-106)/(61-84) 106/66  Pulse range: Heart Rate:  [] 119  Resp rate range: Resp:  [16-25] 22    Device (Oxygen Therapy): room airFlow (L/min):  [1-2] 2  Oxygen range:SpO2:  [92 %-100 %] 94 %   FiO2 (%):  [30 %] 30 %  S RR:  [14] 14  63.5 kg (139 lb 15.9 oz); Body mass index is 20.09 kg/m².    Intake/Output Summary (Last 24 hours) at 2019 1059  Last data filed at 2019 1800  Gross per 24 hour   Intake 240 ml   Output --   Net 240 ml       Physical Exam   Constitutional: He appears well-developed.   HENT:   Head: Normocephalic.   Cardiovascular: Normal rate and regular rhythm.   No murmur heard.  Pulmonary/Chest: He has decreased breath sounds (bilaterally). He has no wheezes. He has rales (few) in the left lower field.   Abdominal: Soft. Bowel sounds are normal. There is no tenderness.   Musculoskeletal: He exhibits no edema.   Neurological: He is alert.   Nursing note and vitals reviewed.    Results Review:    I have reviewed the laboratory and imaging data since the last note by [facility] physician.  My annotations are noted in assessment and plan.    Medication Review:  I have reviewed the current MAR.  My annotations are noted in assessment and plan.       Plan   PCCM Problems  Acute and chronic respiratory failure, NIV  CAP  Acute Rhinovirus infection  Nodular infiltrates  on CT  COPD  ALS  Dysphagia    Plan of Treatment  He is on noninvasive therapy at home.  Uses a regular BiPAP.  He used a hospital vision BiPAP last night again.  I suspect he needs to upgrade to a Trilogy type device. I wonder if a [id number] Ventilation System might work better for him, it might help during the day also - to cough assist etc. I will explore that with DME.  - added EZpap for now.    Community acquired pneumonia with elevated procalcitonin.  Appropriate to treat with antibiotics.  Switch to oral antibiotics in 1 or 2 days.  Repeat chest x-ray noted. Elevated left hemidiaphragm. Needs CT fu of nodular infiltrates.    Acute rhinovirus infection with symptomatic treatment.  Continue with nebulizer treatments.    COPD without exacerbation no need systemic steroids.  Remains on Brovana and budesonide nebs.    I spoke with patient's daughter regarding his dysphagia.  He has been following with [facility].  He has not been recommended a puréed a mechanical soft diet.  He just is careful with the consistencies he eats.  He is pending a PEG feeding tube for nutritional purposes.  I told the daughter that I would like to be cautious regarding the consistency of his food and have placed him on a mechanical soft diet here.  We will get speech to look at him as well.  However he will continue his regular follow-up with speech therapy at [facility] and also get his feeding tube at [facility] on June 7.    Jorge Kirby MD  05/14/19  10:59 AM    Part of this note may be an electronic transcription/translation of spoken language to printed text using the Dragon Dictation System.

## 2019-05-15 VITALS
OXYGEN SATURATION: 97 % | RESPIRATION RATE: 20 BRPM | DIASTOLIC BLOOD PRESSURE: 80 MMHG | BODY MASS INDEX: 20.37 KG/M2 | HEIGHT: 70 IN | TEMPERATURE: 97.8 F | HEART RATE: 114 BPM | WEIGHT: 142.3 LBS | SYSTOLIC BLOOD PRESSURE: 117 MMHG

## 2019-05-15 PROBLEM — J44.9 COPD (CHRONIC OBSTRUCTIVE PULMONARY DISEASE) (HCC): Status: ACTIVE | Noted: 2019-05-15

## 2019-05-15 PROBLEM — F17.200 CURRENT SMOKER: Status: ACTIVE | Noted: 2019-05-15

## 2019-05-15 PROBLEM — R13.10 DYSPHAGIA: Status: ACTIVE | Noted: 2019-05-15

## 2019-05-15 PROBLEM — J96.20 ACUTE AND CHRONIC RESPIRATORY FAILURE (ACUTE-ON-CHRONIC) (HCC): Status: ACTIVE | Noted: 2018-07-05

## 2019-05-15 PROBLEM — G12.21 ALS (AMYOTROPHIC LATERAL SCLEROSIS) (HCC): Status: ACTIVE | Noted: 2019-05-15

## 2019-05-15 PROBLEM — B34.8 RHINOVIRUS: Status: ACTIVE | Noted: 2019-05-15

## 2019-05-15 PROCEDURE — 94799 UNLISTED PULMONARY SVC/PX: CPT

## 2019-05-15 PROCEDURE — 97530 THERAPEUTIC ACTIVITIES: CPT

## 2019-05-15 PROCEDURE — 97165 OT EVAL LOW COMPLEX 30 MIN: CPT

## 2019-05-15 RX ORDER — CEFDINIR 300 MG/1
300 CAPSULE ORAL 2 TIMES DAILY
Qty: 10 CAPSULE | Refills: 0 | Status: SHIPPED | OUTPATIENT
Start: 2019-05-15 | End: 2019-05-21

## 2019-05-15 RX ORDER — AZITHROMYCIN 500 MG/1
500 TABLET, FILM COATED ORAL DAILY
Qty: 3 TABLET | Refills: 0 | Status: SHIPPED | OUTPATIENT
Start: 2019-05-15 | End: 2019-05-18

## 2019-05-15 RX ADMIN — IPRATROPIUM BROMIDE AND ALBUTEROL SULFATE 3 ML: 2.5; .5 SOLUTION RESPIRATORY (INHALATION) at 03:23

## 2019-05-15 RX ADMIN — SODIUM CHLORIDE, PRESERVATIVE FREE 3 ML: 5 INJECTION INTRAVENOUS at 08:45

## 2019-05-15 RX ADMIN — FUROSEMIDE 40 MG: 40 TABLET ORAL at 08:45

## 2019-05-15 RX ADMIN — NICOTINE 1 PATCH: 14 PATCH, EXTENDED RELEASE TRANSDERMAL at 08:44

## 2019-05-15 RX ADMIN — IPRATROPIUM BROMIDE AND ALBUTEROL SULFATE 3 ML: 2.5; .5 SOLUTION RESPIRATORY (INHALATION) at 07:28

## 2019-05-15 RX ADMIN — BUDESONIDE 0.25 MG: 0.25 INHALANT RESPIRATORY (INHALATION) at 11:07

## 2019-05-15 RX ADMIN — ARFORMOTEROL TARTRATE 15 MCG: 15 SOLUTION RESPIRATORY (INHALATION) at 11:06

## 2019-05-15 RX ADMIN — PANTOPRAZOLE SODIUM 40 MG: 40 TABLET, DELAYED RELEASE ORAL at 06:08

## 2019-05-15 NOTE — THERAPY DISCHARGE NOTE
Acute Care - Occupational Therapy Initial Eval/Discharge  [facility]     Patient Name: Tino Paul  : 1960  MRN: [id number]  Today's Date: 5/15/2019               Admit Date: 2019       ICD-10-CM ICD-9-CM   1. Community acquired pneumonia of left lower lobe of lung (CMS/HCC) J18.1 481   2. Rhinovirus B34.8 079.3   3. Community acquired pneumonia of right upper lobe of lung (CMS/HCC) J18.1 481     Patient Active Problem List   Diagnosis   • Acute and chronic respiratory failure (acute-on-chronic) (CMS/[facility])   • Respiratory failure with hypoxia (CMS/[facility])   • CAP (community acquired pneumonia)   • Rhinovirus   • COPD (chronic obstructive pulmonary disease) (CMS/[facility])   • Current smoker   • Dysphagia   • ALS (amyotrophic lateral sclerosis) (CMS/[facility])     Past Medical History:   Diagnosis Date   • AAA (abdominal aortic aneurysm) (CMS/[facility])    • ALS (amyotrophic lateral sclerosis) (CMS/[facility])    • COPD (chronic obstructive pulmonary disease) (CMS/[facility])      Past Surgical History:   Procedure Laterality Date   • CHOLECYSTECTOMY            OT ASSESSMENT FLOWSHEET (last 12 hours)      Occupational Therapy Evaluation     Row Name 05/15/19 0858                   OT Evaluation Time/Intention    Subjective Information  no complaints  -RP        Document Type  discharge evaluation/summary  -RP        Mode of Treatment  occupational therapy  -RP        Patient Effort  good  -RP        Symptoms Noted During/After Treatment  none  -RP           General Information    Patient Profile Reviewed?  yes  -RP        Patient Observations  alert;cooperative;agree to therapy  -RP        General Observations of Patient  pt seated @ EOB w/ no signs of acute distress  -RP        Prior Level of Function  min assist:;mod assist:;ADL's  -RP        Equipment Currently Used at Home  walker, rolling;shower chair  -RP        Pertinent History of Current Functional Problem  pt admitted d/t PNA; h/o ALS, COPD   -RP        Existing  Precautions/Restrictions  fall  -RP           Relationship/Environment    Lives With  child(heydi), adult  -RP           Resource/Environmental Concerns    Current Living Arrangements  home/apartment/condo  -RP           Cognitive Assessment/Intervention- PT/OT    Orientation Status (Cognition)  oriented x 3  -RP        Follows Commands (Cognition)  WFL  -RP        Safety Deficit (Cognitive)  mild deficit  -RP           Bed Mobility Assessment/Treatment    Comment (Bed Mobility)  NT- seated @ EOB  -RP           Functional Mobility    Functional Mobility- Ind. Level  standby assist;supervision required  -RP        Functional Mobility- Device  rolling walker  -RP        Functional Mobility- Comment  pt ambulated from EOB > sink using RW w/ SBA/Sup  -RP           Transfer Assessment/Treatment    Transfer Assessment/Treatment  sit-stand transfer;stand-sit transfer  -RP           Sit-Stand Transfer    Sit-Stand [place] (Transfers)  supervision  -RP        Assistive Device (Sit-Stand Transfers)  walker, front-wheeled  -RP           Stand-Sit Transfer    Stand-Sit [place] (Transfers)  supervision  -RP        Assistive Device (Stand-Sit Transfers)  walker, front-wheeled  -RP           ADL Assessment/Intervention    BADL Assessment/Intervention  lower body dressing;feeding  -RP           Lower Body Dressing Assessment/Training    Lower Body Dressing [place] Level  lower body dressing skills;doff;don;moderate assist (50% patient effort);verbal cues  -RP        Lower Body Dressing Position  edge of bed sitting  -RP        Comment (Lower Body Dressing)  pt able to doff socks, requires A to don socks- states he required assist w/ this at baseline as his daughter assists him  -RP           Self-Feeding Assessment/Training    [place] Level (Feeding)  feeding skills;scoop food and bring to mouth;prepare tray/open items;set up  -RP        Position (Self-Feeding)  edge of bed sitting  -RP           General ROM     GENERAL ROM COMMENTS  B UE AROM WFL  -RP           MMT (Manual Muscle Testing)    General MMT Comments  B UE MMT: grossly approx. 4/5  -RP           Motor Assessment/Interventions    Additional Documentation  Balance (Group)  -RP           Balance    Balance  static sitting balance;static standing balance  -RP           Static Sitting Balance    Level of [place] (Unsupported Sitting, Static Balance)  conditional independence  -RP        Sitting Position (Unsupported Sitting, Static Balance)  sitting on edge of bed  -RP        Time Able to Maintain Position (Unsupported Sitting, Static Balance)  more than 5 minutes  -RP           Static Standing Balance    Level of [place] (Supported Standing, Static Balance)  supervision  -RP        Time Able to Maintain Position (Supported Standing, Static Balance)  1 to 2 minutes  -RP        Assistive Device Utilized (Supported Standing, Static Balance)  walker, rolling  -RP           Sensory Assessment/Intervention    Sensory General Assessment  no sensation deficits identified  -RP           Positioning and Restraints    Pre-Treatment Position  in bed  -RP        Post Treatment Position  bed  -RP        In Bed  sitting EOB;call light within reach;encouraged to call for assist;notified nsg  -RP           Pain Assessment    Additional Documentation  Pain Scale: Numbers Pre/Post-Treatment (Group)  -RP           Pain Scale: Numbers Pre/Post-Treatment    Pain Scale: Numbers, Pretreatment  0/10 - no pain  -RP        Pain Scale: Numbers, Post-Treatment  0/10 - no pain  -RP           Coping    Observed Emotional State  accepting;calm;cooperative  -RP        Verbalized Emotional State  acceptance  -RP           Plan of Care Review    Plan of Care Reviewed With  patient  -RP           Clinical Impression (OT)    OT Diagnosis  Pt presents to OT eval at/near baseline level; pt currently completing STS transfer and functional ambulation at SBA/Sup level; Pt requires mod A for LB  dressing- however, pt states this is his baseline function. Pt demo WFL for B UE AROM and strength. No further skilled OT services warranted. OT to sign off.  -RP        Criteria for Skilled Therapeutic Interventions Met (OT Eval)  no;no problems identified which require skilled intervention;current level of function same as previous level of function  -RP        Care Plan Review (OT)  evaluation/treatment results reviewed;patient/other agree to care plan  -RP        Anticipated Discharge Disposition (OT)  home with assist  -RP           Patient Education Goal (OT)    Activity (Patient Education Goal, OT)  Pt educ on safe transfer techniques and home safety strategies for improved safety at home.  -RP        [place]/Cues/Accuracy (Memory Goal 2, OT)  verbalizes understanding  -RP        Time Frame (Patient Education Goal, OT)  short term goal (STG)  -RP        Progress/Outcome (Patient Education Goal, OT)  goal met  -RP          User Key  (r) = Recorded By, (t) = Taken By, (c) = Cosigned By    Initials Name Effective Dates    RP Geena Garcia OT 05/03/18 -           Occupational Therapy Education     Title: PT OT SLP Therapies (In Progress)     Topic: Occupational Therapy (In Progress)     Point: ADL training (Done)     Description: Instruct learner(s) on proper safety adaptation and remediation techniques during self care or transfers.   Instruct in proper use of assistive devices.    Learning Progress Summary           Patient Acceptance, E, VU by JESSICA at 5/15/2019 10:58 AM    Comment:  OT educ on OT role in therapeutic process and pt's POC. OT also educ on safe transfer techniques and home safety strategies for improved safety.                               User Key     Initials Effective Dates Name Provider Type Discipline    RP 05/03/18 -  Geena Garcia OT Occupational Therapist OT                OT Recommendation and Plan  Outcome Summary/Treatment Plan (OT)  Anticipated Discharge Disposition (OT):  home with assist  Plan of Care Review  Plan of Care Reviewed With: patient  Plan of Care Reviewed With: patient  Outcome Summary: Pt is a 59 year old male admitted d/t PNA w/ h/o ALs and COPD. Pt lives w/ children and reports he required approx. min/mod A w/ ADLs @ PLOF. Pt presents to OT eval at/near baseline level- pt completing STS transfer and functional ambulation using RW at SBA/Sup level; pt requires mod A for LB dressing, but states this is his baseline level. Pt demo WFL for B UE AROM and strength. Further skilled OT services not warranted. Recommend home w/ assistance. OT to sign off.     Rehab Goal Summary     Row Name 05/15/19 0858             Patient Education Goal (OT)    Activity (Patient Education Goal, OT)  Pt educ on safe transfer techniques and home safety strategies for improved safety at home.  -RP      [place]/Cues/Accuracy (Memory Goal 2, OT)  verbalizes understanding  -RP      Time Frame (Patient Education Goal, OT)  short term goal (STG)  -RP      Progress/Outcome (Patient Education Goal, OT)  goal met  -RP        User Key  (r) = Recorded By, (t) = Taken By, (c) = Cosigned By    Initials Name Provider Type Discipline    Geena Nobles, OT Occupational Therapist OT          Outcome Measures     Row Name 05/15/19 1000             How much help from another is currently needed...    Putting on and taking off regular lower body clothing?  2  -RP      Bathing (including washing, rinsing, and drying)  2  -RP      Toileting (which includes using toilet bed pan or urinal)  3  -RP      Putting on and taking off regular upper body clothing  3  -RP      Taking care of personal grooming (such as brushing teeth)  3  -RP      Eating meals  3  -RP      Score  16  -RP         Functional Assessment    Outcome Measure Options  AM-PAC 6 Clicks Daily Activity (OT)  -RP        User Key  (r) = Recorded By, (t) = Taken By, (c) = Cosigned By    Initials Name Provider Type    Geena Nobles OT  Occupational Therapist          Time Calculation:   Time Calculation- OT     Row Name 05/15/19 1101             Time Calculation- OT    OT Start Time  0843  -RP      OT Stop Time  0858  -RP      OT Time Calculation (min)  15 min  -RP      Total Timed Code Minutes- OT  9 minute(s)  -RP      OT Received On  05/15/19  -RP        User Key  (r) = Recorded By, (t) = Taken By, (c) = Cosigned By    Initials Name Provider Type    RP Geena Garcia, OT Occupational Therapist        Therapy Suggested Charges     Code   Minutes Charges    None           Therapy Charges for Today     Code Description Service Date Service Provider Modifiers Qty    [id number]  OT EVAL LOW COMPLEXITY 2 5/15/2019 Geena Garcia, OT GO 1    [id number]  OT THERAPEUTIC ACT EA 15 MIN 5/15/2019 Geena Garcia OT GO 1               OT Discharge Summary  Anticipated Discharge Disposition (OT): home with assist  Reason for Discharge: At baseline function, Maximum functional potential achieved  Outcomes Achieved: Able to achieve all goals within established timeline, Discharge from facility occurred on same date as evluation  Discharge Destination: Home with assist    Geena Garcia OT  5/15/2019

## 2019-05-15 NOTE — PLAN OF CARE
Problem: Patient Care Overview  Goal: Plan of Care Review  Outcome: Ongoing (interventions implemented as appropriate)   05/15/19 0542   Coping/Psychosocial   Plan of Care Reviewed With patient   Plan of Care Review   Progress no change   OTHER   Outcome Summary VSS; pt c/o running nose; pt coughed up thick yellow secrections; PRN tylenol for headache; IV abx ongoing; home bipap used during the night; continue to monitor.     Goal: Individualization and Mutuality  Outcome: Ongoing (interventions implemented as appropriate)    Goal: Discharge Needs Assessment  Outcome: Ongoing (interventions implemented as appropriate)    Goal: Interprofessional Rounds/Family Conf  Outcome: Ongoing (interventions implemented as appropriate)      Problem: Fall Risk (Adult)  Goal: Absence of Fall  Outcome: Ongoing (interventions implemented as appropriate)      Problem: Pneumonia (Adult)  Goal: Signs and Symptoms of Listed Potential Problems Will be Absent, Minimized or Managed (Pneumonia)  Outcome: Ongoing (interventions implemented as appropriate)

## 2019-05-15 NOTE — DISCHARGE SUMMARY
Date of Admission: 5/12/2019  Date of Discharge:  5/15/2019    Discharge Diagnosis:    Acute and chronic respiratory failure, NIV  CAP  Acute Rhinovirus infection  Nodular infiltrates on CT RUL  COPD  ALS  Dysphagia  Current smoker      Hospital Course    Presenting Problem/History of Present Illness    Very pleasant 59-year-old gentleman with known history of ALS follows my partner Dr. Mccauley.  He also has history of COPD and chronic respiratory failure.  Patient per daughter has been sick for the last 1 week with cough productive of yellowish sputum.  He was noted to have a fever of 101.2 in the emergency room today.  There is no history of aspiration reported by the daughter.  He is on a puréed diet.  Currently patient on BiPAP unable to give any meaningful history as such  He states he is pending a PEG tube placement on June 7.  He states he takes regular diet despite recommendation of puréed.  He states he eats carefully.  He feels better.  He has a BiPAP at home that he uses consistently.    Subsequent Course of Management    49-year-old male with ALS.  Admitted with acute rhinovirus and community-acquired pneumonia.  He was treated with breathing treatments flutter valve chest PT pulmonary toilet with improvement.  His white count has gotten better.  He will be switched to oral antibiotics and discharged home today.  He was positive for the rhinovirus infection.  His voice remains weak and he was seen by speech here.  He has pending a PEG feeding tube as outlined above.  I spoke to his daughter on admission as well as on the date of discharge.  I explained to her that his ALS is progressive and will cause further weakening and worsening of his respiratory status.  However he seems to be compensated at this time.  He would benefit from a CoughAssist device and I have CCP looking into whether he could get 1 of those devices.  In the meantime he will continue with his BiPAP device which seems to be adequate for  "ventilation so far.  Eventually I suspect he will need a trilogy device.  I did explore a life 2000 or a VOCN device for him.  However at this time it does not feel that either of these would be appropriate.  I spoke to the daughter regarding his smoking habit and he needs to completely stop smoking if he wishes to not get into a lot more trouble with his lungs.  He needs to be very careful with his diet.  He will continue follow-up in our office with Dr. Ritter.  He requires follow-up imaging to make sure his pulmonary infiltrates cleared up.    Examination on Date of Discharge    /80 (BP Location: Left arm, Patient Position: Sitting)   Pulse 98   Temp 97.8 °F (36.6 °C) (Oral)   Resp 20   Ht 177.8 cm (70\")   Wt 64.5 kg (142 lb 4.8 oz)   SpO2 97%   BMI 20.42 kg/m²     Physical Exam   HENT:   Head: Normocephalic.   Eyes: Pupils are equal, round, and reactive to light.   Cardiovascular: Normal rate and regular rhythm.   No murmur heard.  Pulmonary/Chest: He has decreased breath sounds. He has no wheezes. He has no rales.   Abdominal: Soft. Bowel sounds are normal. There is no tenderness.   Musculoskeletal: He exhibits no edema.   Neurological: He is alert.   Nursing note and vitals reviewed.      Test Results    Results for orders placed during the hospital encounter of 09/14/18   Adult Transthoracic Echo Complete W/ Cont if Necessary Per Protocol    Narrative · Left ventricular systolic function is normal. Estimated EF = 60%.  · Normal left ventricular cavity size and wall thickness noted. All left   ventricular wall segments contract normally.  · Left ventricular diastolic function is normal.          Results from last 7 days   Lab Units 05/12/19 2120   PH, ARTERIAL pH units 7.395   PCO2, ARTERIAL mm Hg 47.9*   PO2 ART mm Hg 165.9*   FLOW RATE lpm 3   MODALITY  Cannula   O2 SATURATION CALC % 99.5*       Results from last 7 days   Lab Units 05/14/19  0330 05/12/19 2032   WBC 10*3/mm3 10.37 13.87* "   HEMOGLOBIN g/dL 14.8 15.6   HEMATOCRIT % 47.5 48.9   PLATELETS 10*3/mm3 309 288       Results from last 7 days   Lab Units 05/14/19  0330 05/12/19 2032   SODIUM mmol/L 137 138   POTASSIUM mmol/L 3.9 4.3   CHLORIDE mmol/L 95* 98   CO2 mmol/L 31.8* 26.2   BUN mg/dL 15 16   CREATININE mg/dL 0.46* 0.47*             Microbiology Results (last 10 days)     Procedure Component Value - Date/Time    Blood Culture - Blood, Blood, Venous Line [[id number]] Collected:  05/12/19 2048    Lab Status:  Preliminary result Specimen:  Blood, Venous Line Updated:  05/14/19 2100     Blood Culture No growth at 2 days    Respiratory Panel, PCR - Swab, Nasopharynx [[id number]]  (Abnormal) Collected:  05/12/19 2043    Lab Status:  Final result Specimen:  Swab from Nasopharynx Updated:  05/12/19 2156     ADENOVIRUS, PCR Not Detected     Coronavirus 229E Not Detected     Coronavirus HKU1 Not Detected     Coronavirus NL63 Not Detected     Coronavirus OC43 Not Detected     Human Metapneumovirus Not Detected     Human Rhinovirus/Enterovirus Detected     Influenza B PCR Not Detected     Parainfluenza Virus 1 Not Detected     Parainfluenza Virus 2 Not Detected     Parainfluenza Virus 3 Not Detected     Parainfluenza Virus 4 Not Detected     Bordetella pertussis pcr Not Detected     Influenza A H1 2009 PCR Not Detected     Chlamydophila pneumoniae PCR Not Detected     Mycoplasma pneumo by PCR Not Detected     Influenza A PCR Not Detected     Influenza A H3 Not Detected     Influenza A H1 Not Detected     RSV, PCR Not Detected     Bordetella parapertussis PCR Not Detected    Blood Culture - Blood, Arm, Left [[id number]] Collected:  05/12/19 2031    Lab Status:  Preliminary result Specimen:  Blood from Arm, Left Updated:  05/14/19 2045     Blood Culture No growth at 2 days          Ct Chest Without Contrast    Result Date: 5/12/2019  THORACIC CT SCAN WITHOUT CONTRAST  HISTORY: pneumonia; J18.1-Lobar pneumonia, unspecified organism; B34.8-Other viral  infections of unspecified site  COMPARISON: None.  TECHNIQUE:  Radiation dose reduction techniques were utilized, including automated exposure control and exposure modulation based on body size. Axial images of the thorax obtained without IV contrast, per request.  FINDINGS: There are a couple of small irregular subcentimeter nodules in the right upper lobe. Largest on image 17 only measures 5 mm. These may be infectious, inflammatory, or neoplastic. There is some focal punctate, tree-in-bud-type nodularity posteriorly in the left upper lobe which is most likely infectious in nature. Mild emphysema and fibrosis. Left diaphragm is elevated with some adjacent atelectasis. No significant effusion. There are calcified residua of granulomatous disease present. Aorta nonaneurysmal. No obvious adenopathy by noncontrast technique. Prior cholecystectomy       1. Mild emphysema with nodular densities as discussed. Follow-up will be needed to ensure resolution       .  This report was finalized on 5/12/2019 10:36 PM by Curry Gould M.D.      Xr Chest 1 View    Result Date: 5/14/2019  CLINICAL HISTORY: 59-year-old male for follow-up of pulmonary infiltrates.  PORTABLE AP ERECT CHEST DATED 05/14/2019  FINDINGS: The current study is compared to prior chest radiographs of 09/23/2018 and 05/12/2019 as well as with CT chest exam dated 05/12/2019 at 2226 hours. The subtle findings demonstrated on the CT scan are not readily apparent on current or prior chest radiographs. The mild blunting of left costophrenic angle and apparent discoid opacity in the lateral basilar left lung are again demonstrated. Reticular nodular prominence of interstitial markings in both lungs persists. The heart is apparently normal caliber. No pneumothorax or acute abnormality of bony thorax is seen. Oxygen delivery apparatus is seen about the lower face and right upper chest. Monitoring lead wires are present.  CONCLUSION: There is little if any change  from chest radiograph 05/12/2019 with reticular nodular interstitial pattern in the lungs and some persistent blunting of left costophrenic angle. The more subtle findings demonstrated on the CT scan of 05/12/2019 will need to be followed by subsequent CT scan.  This report was finalized on 5/14/2019 9:28 AM by Dr. Rashaad Whitley M.D.      Xr Chest 1 View    Result Date: 5/12/2019  PORTABLE CHEST X-RAY  CLINICAL HISTORY: soa  COMPARISON: 09/23/2018.  FINDINGS: Portable AP view of the chest was obtained with overlying monitor leads in place. Lungs are fairly well inflated. Improving left lung base atelectasis with probable small left effusion, also improved. Right lung is clear. Heart size and vascularity are normal.        Some improvement in suspected left lung base atelectasis and effusion   This report was finalized on 5/12/2019 8:52 PM by Curry Gould M.D.        Consulting Physician(s)             None                   Discharge Instructions      Dietary Orders (From admission, onward)    Start     Ordered    05/13/19 1049  Diet Dysphagia; IV - Mechanical Soft No Mixed Consistencies; Thin  Diet Effective Now     Question Answer Comment   Diet Texture / Consistency Dysphagia    Select Dysphagia level: IV - Mechanical Soft No Mixed Consistencies    Fluid Consistency: Thin        05/13/19 1049                  Your medication list      START taking these medications      Instructions Last Dose Given Next Dose Due   azithromycin 500 MG tablet  Commonly known as:  ZITHROMAX      Take 1 tablet by mouth Daily for 3 days.       cefdinir 300 MG capsule  Commonly known as:  OMNICEF      Take 1 capsule by mouth 2 (Two) Times a Day for 5 days.          CHANGE how you take these medications      Instructions Last Dose Given Next Dose Due   furosemide 40 MG tablet  Commonly known as:  LASIX  What changed:  how much to take      Take 1 tablet by mouth Daily.       ipratropium-albuterol 0.5-2.5 mg/3 ml nebulizer  Commonly  known as:  DUONEB  What changed:  when to take this      Take 3 mL by nebulization Every 4 (Four) Hours As Needed for Wheezing or Shortness of Air.          CONTINUE taking these medications      Instructions Last Dose Given Next Dose Due   arformoterol 15 MCG/2ML nebulizer solution  Commonly known as:  BROVANA      Take 2 mL by nebulization 2 (Two) Times a Day.       budesonide 0.25 MG/2ML nebulizer solution  Commonly known as:  PULMICORT      Take 2 mL by nebulization 2 (Two) Times a Day.       guaiFENesin 600 MG 12 hr tablet  Commonly known as:  MUCINEX      Take 1 tablet by mouth Every 12 (Twelve) Hours.       Melatonin 10 MG tablet      Take 10 mg by mouth Every Night.       nicotine 14 MG/24HR patch  Commonly known as:  NICODERM CQ      Place 1 patch on the skin as directed by provider Daily.       nicotine polacrilex 2 MG gum  Commonly known as:  NICORETTE      Chew 1 each Every 1 (One) Hour As Needed for Smoking Cessation.       pantoprazole 40 MG EC tablet  Commonly known as:  PROTONIX      Take  by mouth Daily.       sertraline 25 MG tablet  Commonly known as:  ZOLOFT      TK 1 T PO D FOR ANXIETY             Where to Get Your Medications      These medications were sent to [facility] [address] [place] [address] [address] AT [facility] [address] [facility] - [phone] [facility] [phone] FX  [address], [facility] [address]    Phone:  [phone]   · azithromycin 500 MG tablet  · cefdinir 300 MG capsule         Follow-up Information     Quirino Small Follow up.    Specialty:  Family Medicine  Contact information:  [address]  [facility] [address] [phone]             Garrick Ritter MD Follow up in 2 month(s).    Specialty:  Pulmonary Disease  Why:  with results of ct chest to be done in 6 weeks  Contact information:  [address] ROSITA AIKEN  [address] [address] [phone]                   Additional Instructions for the Follow-ups that You Need to Schedule     CT chest wo  contrast   Jul 01, 2019      Fax results to dr. licona at [facility] [phone]    Order Comments:  Fax results to dr. licona at [facility] [phone]                Condition on Discharge:  Stable     Jorge Kirby MD  05/15/19  10:15 AM    Time: I spent over 30mins in the discharge planning of this patient.    Some of this encounter note is an electronic transcription/translation of spoken language to printed text.

## 2019-05-15 NOTE — PLAN OF CARE
Problem: Patient Care Overview  Goal: Plan of Care Review  Outcome: Outcome(s) achieved Date Met: 05/15/19   05/15/19 1044   Coping/Psychosocial   Plan of Care Reviewed With patient   Plan of Care Review   Progress improving   OTHER   Outcome Summary Vitals stable. No pain or discomfort. pt discharged to home.

## 2019-05-15 NOTE — PROGRESS NOTES
Case Management Discharge Note    Final Note: Home with daughter and continue outpatient PT at [facility] as previously done prior to admit.  Transported home by a friend.      Destination      No service has been selected for the patient.      Durable Medical Equipment      No service has been selected for the patient.      Dialysis/Infusion      No service has been selected for the patient.      Home Medical Care      No service has been selected for the patient.      Therapy      No service has been selected for the patient.      Community Resources      No service has been selected for the patient.        Transportation Services  Private: Car    Final Discharge Disposition Code: 01 - home or self-care

## 2019-05-15 NOTE — PLAN OF CARE
Problem: Patient Care Overview  Goal: Plan of Care Review  Outcome: Ongoing (interventions implemented as appropriate)   05/15/19 1057   Coping/Psychosocial   Plan of Care Reviewed With patient   Plan of Care Review   Progress improving   OTHER   Outcome Summary Pt is a 59 year old male admitted d/t PNA w/ h/o ALs and COPD. Pt lives w/ children and reports he required approx. min/mod A w/ ADLs @ PLOF. Pt presents to OT eval at/near baseline level- pt completing STS transfer and functional ambulation using RW at SBA/Sup level; pt requires mod A for LB dressing, but states this is his baseline level. Pt demo WFL for B UE AROM and strength. Further skilled OT services not warranted. Recommend home w/ assistance. OT to sign off.

## 2019-05-17 LAB
BACTERIA SPEC AEROBE CULT: NORMAL
BACTERIA SPEC AEROBE CULT: NORMAL

## 2019-05-21 ENCOUNTER — HOSPITAL ENCOUNTER (INPATIENT)
Facility: HOSPITAL | Age: 59
LOS: 8 days | Discharge: HOME | End: 2019-05-29
Attending: EMERGENCY MEDICINE | Admitting: INTERNAL MEDICINE

## 2019-05-21 ENCOUNTER — APPOINTMENT (OUTPATIENT)
Dept: GENERAL RADIOLOGY | Facility: HOSPITAL | Age: 59
End: 2019-05-21

## 2019-05-21 DIAGNOSIS — J96.22 ACUTE ON CHRONIC RESPIRATORY FAILURE WITH HYPERCAPNIA (HCC): Primary | ICD-10-CM

## 2019-05-21 DIAGNOSIS — G12.21 ALS (AMYOTROPHIC LATERAL SCLEROSIS) (HCC): ICD-10-CM

## 2019-05-21 DIAGNOSIS — R77.8 ELEVATED TROPONIN: ICD-10-CM

## 2019-05-21 DIAGNOSIS — R13.12 OROPHARYNGEAL DYSPHAGIA: ICD-10-CM

## 2019-05-21 LAB
ANION GAP SERPL CALCULATED.3IONS-SCNC: 13.4 MMOL/L
ARTERIAL PATENCY WRIST A: POSITIVE
ATMOSPHERIC PRESS: 751.2 MMHG
BASE EXCESS BLDA CALC-SCNC: 4.7 MMOL/L (ref 0–2)
BASOPHILS # BLD AUTO: 0.06 10*3/MM3 (ref 0–0.2)
BASOPHILS NFR BLD AUTO: 0.6 % (ref 0–1.5)
BDY SITE: ABNORMAL
BUN BLD-MCNC: 20 MG/DL (ref 8–23)
BUN/CREAT SERPL: 33.9 (ref 7–25)
CALCIUM SPEC-SCNC: 10.2 MG/DL (ref 8.6–10.5)
CHLORIDE SERPL-SCNC: 98 MMOL/L (ref 98–107)
CO2 SERPL-SCNC: 29.6 MMOL/L (ref 22–29)
CREAT BLD-MCNC: 0.59 MG/DL (ref 0.76–1.27)
DEPRECATED RDW RBC AUTO: 45.7 FL (ref 37–54)
EOSINOPHIL # BLD AUTO: 0.09 10*3/MM3 (ref 0–0.4)
EOSINOPHIL NFR BLD AUTO: 0.8 % (ref 0.3–6.2)
ERYTHROCYTE [DISTWIDTH] IN BLOOD BY AUTOMATED COUNT: 13.6 % (ref 12.3–15.4)
GAS FLOW AIRWAY: 2 LPM
GFR SERPL CREATININE-BSD FRML MDRD: 140 ML/MIN/1.73
GLUCOSE BLD-MCNC: 103 MG/DL (ref 65–99)
HCO3 BLDA-SCNC: 31.8 MMOL/L (ref 22–28)
HCT VFR BLD AUTO: 54.7 % (ref 37.5–51)
HGB BLD-MCNC: 16.7 G/DL (ref 13–17.7)
IMM GRANULOCYTES # BLD AUTO: 0.05 10*3/MM3 (ref 0–0.05)
IMM GRANULOCYTES NFR BLD AUTO: 0.5 % (ref 0–0.5)
LYMPHOCYTES # BLD AUTO: 2.1 10*3/MM3 (ref 0.7–3.1)
LYMPHOCYTES NFR BLD AUTO: 19.8 % (ref 19.6–45.3)
MCH RBC QN AUTO: 27.9 PG (ref 26.6–33)
MCHC RBC AUTO-ENTMCNC: 30.5 G/DL (ref 31.5–35.7)
MCV RBC AUTO: 91.3 FL (ref 79–97)
MODALITY: ABNORMAL
MONOCYTES # BLD AUTO: 0.67 10*3/MM3 (ref 0.1–0.9)
MONOCYTES NFR BLD AUTO: 6.3 % (ref 5–12)
NEUTROPHILS # BLD AUTO: 7.62 10*3/MM3 (ref 1.7–7)
NEUTROPHILS NFR BLD AUTO: 72 % (ref 42.7–76)
NRBC BLD AUTO-RTO: 0 /100 WBC (ref 0–0.2)
NT-PROBNP SERPL-MCNC: 17.9 PG/ML (ref 5–900)
PCO2 BLDA: 52.8 MM HG (ref 35–45)
PH BLDA: 7.39 PH UNITS (ref 7.35–7.45)
PLATELET # BLD AUTO: 422 10*3/MM3 (ref 140–450)
PMV BLD AUTO: 9.6 FL (ref 6–12)
PO2 BLDA: 66.1 MM HG (ref 80–100)
POTASSIUM BLD-SCNC: 4.1 MMOL/L (ref 3.5–5.2)
RBC # BLD AUTO: 5.99 10*6/MM3 (ref 4.14–5.8)
SAO2 % BLDCOA: 92.1 % (ref 92–99)
SODIUM BLD-SCNC: 141 MMOL/L (ref 136–145)
TOTAL RATE: 20 BREATHS/MINUTE
TROPONIN T SERPL-MCNC: 0.05 NG/ML (ref 0–0.03)
WBC NRBC COR # BLD: 10.59 10*3/MM3 (ref 3.4–10.8)

## 2019-05-21 PROCEDURE — 85025 COMPLETE CBC W/AUTO DIFF WBC: CPT | Performed by: EMERGENCY MEDICINE

## 2019-05-21 PROCEDURE — 71046 X-RAY EXAM CHEST 2 VIEWS: CPT

## 2019-05-21 PROCEDURE — 80048 BASIC METABOLIC PNL TOTAL CA: CPT | Performed by: EMERGENCY MEDICINE

## 2019-05-21 PROCEDURE — 94799 UNLISTED PULMONARY SVC/PX: CPT

## 2019-05-21 PROCEDURE — 83880 ASSAY OF NATRIURETIC PEPTIDE: CPT | Performed by: EMERGENCY MEDICINE

## 2019-05-21 PROCEDURE — 94660 CPAP INITIATION&MGMT: CPT

## 2019-05-21 PROCEDURE — G0378 HOSPITAL OBSERVATION PER HR: HCPCS

## 2019-05-21 PROCEDURE — 36600 WITHDRAWAL OF ARTERIAL BLOOD: CPT

## 2019-05-21 PROCEDURE — 93010 ELECTROCARDIOGRAM REPORT: CPT | Performed by: INTERNAL MEDICINE

## 2019-05-21 PROCEDURE — 82803 BLOOD GASES ANY COMBINATION: CPT

## 2019-05-21 PROCEDURE — 84484 ASSAY OF TROPONIN QUANT: CPT | Performed by: EMERGENCY MEDICINE

## 2019-05-21 PROCEDURE — 99284 EMERGENCY DEPT VISIT MOD MDM: CPT

## 2019-05-21 PROCEDURE — 93005 ELECTROCARDIOGRAM TRACING: CPT | Performed by: EMERGENCY MEDICINE

## 2019-05-21 RX ORDER — SERTRALINE HYDROCHLORIDE 25 MG/1
25 TABLET, FILM COATED ORAL DAILY
COMMUNITY

## 2019-05-21 RX ORDER — SODIUM CHLORIDE 0.9 % (FLUSH) 0.9 %
10 SYRINGE (ML) INJECTION AS NEEDED
Status: DISCONTINUED | OUTPATIENT
Start: 2019-05-21 | End: 2019-05-29 | Stop reason: HOSPADM

## 2019-05-22 LAB
DEPRECATED RDW RBC AUTO: 44.5 FL (ref 37–54)
ERYTHROCYTE [DISTWIDTH] IN BLOOD BY AUTOMATED COUNT: 13.2 % (ref 12.3–15.4)
HCT VFR BLD AUTO: 50.6 % (ref 37.5–51)
HGB BLD-MCNC: 15.8 G/DL (ref 13–17.7)
MCH RBC QN AUTO: 28.8 PG (ref 26.6–33)
MCHC RBC AUTO-ENTMCNC: 31.2 G/DL (ref 31.5–35.7)
MCV RBC AUTO: 92.2 FL (ref 79–97)
PLATELET # BLD AUTO: 349 10*3/MM3 (ref 140–450)
PMV BLD AUTO: 9.7 FL (ref 6–12)
RBC # BLD AUTO: 5.49 10*6/MM3 (ref 4.14–5.8)
WBC NRBC COR # BLD: 7.56 10*3/MM3 (ref 3.4–10.8)

## 2019-05-22 PROCEDURE — G0378 HOSPITAL OBSERVATION PER HR: HCPCS

## 2019-05-22 PROCEDURE — 25010000002 HEPARIN (PORCINE) PER 1000 UNITS: Performed by: INTERNAL MEDICINE

## 2019-05-22 PROCEDURE — 94799 UNLISTED PULMONARY SVC/PX: CPT

## 2019-05-22 PROCEDURE — 94640 AIRWAY INHALATION TREATMENT: CPT

## 2019-05-22 PROCEDURE — 85027 COMPLETE CBC AUTOMATED: CPT | Performed by: INTERNAL MEDICINE

## 2019-05-22 RX ORDER — PANTOPRAZOLE SODIUM 40 MG/1
40 TABLET, DELAYED RELEASE ORAL DAILY
Status: DISCONTINUED | OUTPATIENT
Start: 2019-05-22 | End: 2019-05-27

## 2019-05-22 RX ORDER — SODIUM CHLORIDE 0.9 % (FLUSH) 0.9 %
3 SYRINGE (ML) INJECTION EVERY 12 HOURS SCHEDULED
Status: DISCONTINUED | OUTPATIENT
Start: 2019-05-22 | End: 2019-05-29 | Stop reason: HOSPADM

## 2019-05-22 RX ORDER — ACETAMINOPHEN 325 MG/1
650 TABLET ORAL EVERY 4 HOURS PRN
Status: DISCONTINUED | OUTPATIENT
Start: 2019-05-22 | End: 2019-05-29 | Stop reason: HOSPADM

## 2019-05-22 RX ORDER — NICOTINE 21 MG/24HR
1 PATCH, TRANSDERMAL 24 HOURS TRANSDERMAL EVERY 24 HOURS
Status: DISCONTINUED | OUTPATIENT
Start: 2019-05-22 | End: 2019-05-29 | Stop reason: HOSPADM

## 2019-05-22 RX ORDER — SODIUM CHLORIDE 0.9 % (FLUSH) 0.9 %
3-10 SYRINGE (ML) INJECTION AS NEEDED
Status: DISCONTINUED | OUTPATIENT
Start: 2019-05-22 | End: 2019-05-29 | Stop reason: HOSPADM

## 2019-05-22 RX ORDER — GUAIFENESIN 600 MG/1
600 TABLET, EXTENDED RELEASE ORAL EVERY 12 HOURS SCHEDULED
Status: DISCONTINUED | OUTPATIENT
Start: 2019-05-22 | End: 2019-05-27

## 2019-05-22 RX ORDER — SERTRALINE HYDROCHLORIDE 25 MG/1
25 TABLET, FILM COATED ORAL DAILY
Status: DISCONTINUED | OUTPATIENT
Start: 2019-05-22 | End: 2019-05-24

## 2019-05-22 RX ORDER — IPRATROPIUM BROMIDE AND ALBUTEROL SULFATE 2.5; .5 MG/3ML; MG/3ML
3 SOLUTION RESPIRATORY (INHALATION) EVERY 4 HOURS PRN
Status: DISCONTINUED | OUTPATIENT
Start: 2019-05-22 | End: 2019-05-29 | Stop reason: HOSPADM

## 2019-05-22 RX ORDER — HEPARIN SODIUM 5000 [USP'U]/ML
5000 INJECTION, SOLUTION INTRAVENOUS; SUBCUTANEOUS EVERY 8 HOURS SCHEDULED
Status: DISCONTINUED | OUTPATIENT
Start: 2019-05-22 | End: 2019-05-24

## 2019-05-22 RX ORDER — CHOLECALCIFEROL (VITAMIN D3) 125 MCG
10 CAPSULE ORAL NIGHTLY
Status: DISCONTINUED | OUTPATIENT
Start: 2019-05-22 | End: 2019-05-29 | Stop reason: HOSPADM

## 2019-05-22 RX ORDER — FUROSEMIDE 20 MG/1
20 TABLET ORAL DAILY
Status: DISCONTINUED | OUTPATIENT
Start: 2019-05-22 | End: 2019-05-29 | Stop reason: HOSPADM

## 2019-05-22 RX ORDER — ARFORMOTEROL TARTRATE 15 UG/2ML
15 SOLUTION RESPIRATORY (INHALATION)
Status: DISCONTINUED | OUTPATIENT
Start: 2019-05-22 | End: 2019-05-29 | Stop reason: HOSPADM

## 2019-05-22 RX ORDER — BUDESONIDE 0.25 MG/2ML
0.25 INHALANT ORAL
Status: DISCONTINUED | OUTPATIENT
Start: 2019-05-22 | End: 2019-05-29 | Stop reason: HOSPADM

## 2019-05-22 RX ADMIN — HEPARIN SODIUM 5000 UNITS: 5000 INJECTION INTRAVENOUS; SUBCUTANEOUS at 06:15

## 2019-05-22 RX ADMIN — FUROSEMIDE 20 MG: 20 TABLET ORAL at 09:03

## 2019-05-22 RX ADMIN — SODIUM CHLORIDE, PRESERVATIVE FREE 3 ML: 5 INJECTION INTRAVENOUS at 22:00

## 2019-05-22 RX ADMIN — PANTOPRAZOLE SODIUM 40 MG: 40 TABLET, DELAYED RELEASE ORAL at 09:04

## 2019-05-22 RX ADMIN — BUDESONIDE 0.25 MG: 0.25 INHALANT RESPIRATORY (INHALATION) at 10:42

## 2019-05-22 RX ADMIN — GUAIFENESIN 600 MG: 600 TABLET, EXTENDED RELEASE ORAL at 09:03

## 2019-05-22 RX ADMIN — GUAIFENESIN 600 MG: 600 TABLET, EXTENDED RELEASE ORAL at 22:00

## 2019-05-22 RX ADMIN — Medication 10 MG: at 22:00

## 2019-05-22 RX ADMIN — SERTRALINE 25 MG: 25 TABLET, FILM COATED ORAL at 09:03

## 2019-05-22 RX ADMIN — HEPARIN SODIUM 5000 UNITS: 5000 INJECTION INTRAVENOUS; SUBCUTANEOUS at 22:00

## 2019-05-22 RX ADMIN — SODIUM CHLORIDE, PRESERVATIVE FREE 3 ML: 5 INJECTION INTRAVENOUS at 09:05

## 2019-05-22 RX ADMIN — BUDESONIDE 0.25 MG: 0.25 INHALANT RESPIRATORY (INHALATION) at 22:16

## 2019-05-22 RX ADMIN — ARFORMOTEROL TARTRATE 15 MCG: 15 SOLUTION RESPIRATORY (INHALATION) at 22:16

## 2019-05-22 RX ADMIN — ARFORMOTEROL TARTRATE 15 MCG: 15 SOLUTION RESPIRATORY (INHALATION) at 10:42

## 2019-05-22 RX ADMIN — NICOTINE 1 PATCH: 14 PATCH, EXTENDED RELEASE TRANSDERMAL at 06:14

## 2019-05-23 PROCEDURE — 25010000002 HEPARIN (PORCINE) PER 1000 UNITS: Performed by: INTERNAL MEDICINE

## 2019-05-23 PROCEDURE — 94799 UNLISTED PULMONARY SVC/PX: CPT

## 2019-05-23 RX ADMIN — GUAIFENESIN 600 MG: 600 TABLET, EXTENDED RELEASE ORAL at 07:36

## 2019-05-23 RX ADMIN — ARFORMOTEROL TARTRATE 15 MCG: 15 SOLUTION RESPIRATORY (INHALATION) at 21:53

## 2019-05-23 RX ADMIN — SERTRALINE 25 MG: 25 TABLET, FILM COATED ORAL at 07:36

## 2019-05-23 RX ADMIN — HEPARIN SODIUM 5000 UNITS: 5000 INJECTION INTRAVENOUS; SUBCUTANEOUS at 15:22

## 2019-05-23 RX ADMIN — Medication 10 MG: at 21:57

## 2019-05-23 RX ADMIN — ARFORMOTEROL TARTRATE 15 MCG: 15 SOLUTION RESPIRATORY (INHALATION) at 06:46

## 2019-05-23 RX ADMIN — ACETAMINOPHEN 650 MG: 325 TABLET ORAL at 01:51

## 2019-05-23 RX ADMIN — HEPARIN SODIUM 5000 UNITS: 5000 INJECTION INTRAVENOUS; SUBCUTANEOUS at 21:57

## 2019-05-23 RX ADMIN — HEPARIN SODIUM 5000 UNITS: 5000 INJECTION INTRAVENOUS; SUBCUTANEOUS at 06:42

## 2019-05-23 RX ADMIN — ACETAMINOPHEN 650 MG: 325 TABLET ORAL at 22:17

## 2019-05-23 RX ADMIN — SODIUM CHLORIDE, PRESERVATIVE FREE 3 ML: 5 INJECTION INTRAVENOUS at 07:37

## 2019-05-23 RX ADMIN — NICOTINE 1 PATCH: 14 PATCH, EXTENDED RELEASE TRANSDERMAL at 03:34

## 2019-05-23 RX ADMIN — FUROSEMIDE 20 MG: 20 TABLET ORAL at 07:36

## 2019-05-23 RX ADMIN — BUDESONIDE 0.25 MG: 0.25 INHALANT RESPIRATORY (INHALATION) at 06:46

## 2019-05-23 RX ADMIN — GUAIFENESIN 600 MG: 600 TABLET, EXTENDED RELEASE ORAL at 21:57

## 2019-05-23 RX ADMIN — BUDESONIDE 0.25 MG: 0.25 INHALANT RESPIRATORY (INHALATION) at 21:53

## 2019-05-24 PROBLEM — R13.12 OROPHARYNGEAL DYSPHAGIA: Status: ACTIVE | Noted: 2019-05-21

## 2019-05-24 PROCEDURE — 94799 UNLISTED PULMONARY SVC/PX: CPT

## 2019-05-24 PROCEDURE — 99221 1ST HOSP IP/OBS SF/LOW 40: CPT | Performed by: SURGERY

## 2019-05-24 PROCEDURE — 25010000002 HEPARIN (PORCINE) PER 1000 UNITS: Performed by: INTERNAL MEDICINE

## 2019-05-24 RX ORDER — HEPARIN SODIUM 5000 [USP'U]/ML
5000 INJECTION, SOLUTION INTRAVENOUS; SUBCUTANEOUS EVERY 8 HOURS SCHEDULED
Status: DISCONTINUED | OUTPATIENT
Start: 2019-05-26 | End: 2019-05-29 | Stop reason: HOSPADM

## 2019-05-24 RX ORDER — SERTRALINE HYDROCHLORIDE 25 MG/1
25 TABLET, FILM COATED ORAL 2 TIMES DAILY
Status: DISCONTINUED | OUTPATIENT
Start: 2019-05-24 | End: 2019-05-29 | Stop reason: HOSPADM

## 2019-05-24 RX ADMIN — SODIUM CHLORIDE, PRESERVATIVE FREE 3 ML: 5 INJECTION INTRAVENOUS at 09:27

## 2019-05-24 RX ADMIN — ARFORMOTEROL TARTRATE 15 MCG: 15 SOLUTION RESPIRATORY (INHALATION) at 07:40

## 2019-05-24 RX ADMIN — Medication 10 MG: at 20:45

## 2019-05-24 RX ADMIN — SERTRALINE 25 MG: 25 TABLET, FILM COATED ORAL at 09:27

## 2019-05-24 RX ADMIN — HEPARIN SODIUM 5000 UNITS: 5000 INJECTION INTRAVENOUS; SUBCUTANEOUS at 06:17

## 2019-05-24 RX ADMIN — HEPARIN SODIUM 5000 UNITS: 5000 INJECTION INTRAVENOUS; SUBCUTANEOUS at 14:32

## 2019-05-24 RX ADMIN — GUAIFENESIN 600 MG: 600 TABLET, EXTENDED RELEASE ORAL at 20:45

## 2019-05-24 RX ADMIN — PANTOPRAZOLE SODIUM 40 MG: 40 TABLET, DELAYED RELEASE ORAL at 09:27

## 2019-05-24 RX ADMIN — FUROSEMIDE 20 MG: 20 TABLET ORAL at 09:27

## 2019-05-24 RX ADMIN — ARFORMOTEROL TARTRATE 15 MCG: 15 SOLUTION RESPIRATORY (INHALATION) at 19:23

## 2019-05-24 RX ADMIN — GUAIFENESIN 600 MG: 600 TABLET, EXTENDED RELEASE ORAL at 09:27

## 2019-05-24 RX ADMIN — SERTRALINE 25 MG: 25 TABLET, FILM COATED ORAL at 20:45

## 2019-05-24 RX ADMIN — BUDESONIDE 0.25 MG: 0.25 INHALANT RESPIRATORY (INHALATION) at 19:23

## 2019-05-24 RX ADMIN — NICOTINE 1 PATCH: 14 PATCH, EXTENDED RELEASE TRANSDERMAL at 02:23

## 2019-05-24 RX ADMIN — BUDESONIDE 0.25 MG: 0.25 INHALANT RESPIRATORY (INHALATION) at 07:40

## 2019-05-25 ENCOUNTER — ANESTHESIA EVENT (OUTPATIENT)
Dept: PERIOP | Facility: HOSPITAL | Age: 59
End: 2019-05-25

## 2019-05-25 ENCOUNTER — ANESTHESIA (OUTPATIENT)
Dept: PERIOP | Facility: HOSPITAL | Age: 59
End: 2019-05-25

## 2019-05-25 PROCEDURE — 25010000002 PHENYLEPHRINE PER 1 ML: Performed by: ANESTHESIOLOGY

## 2019-05-25 PROCEDURE — 25010000002 ONDANSETRON PER 1 MG: Performed by: INTERNAL MEDICINE

## 2019-05-25 PROCEDURE — 0DH68UZ INSERTION OF FEEDING DEVICE INTO STOMACH, VIA NATURAL OR ARTIFICIAL OPENING ENDOSCOPIC: ICD-10-PCS | Performed by: SURGERY

## 2019-05-25 PROCEDURE — 25010000002 HYDROMORPHONE PER 4 MG: Performed by: SURGERY

## 2019-05-25 PROCEDURE — 25010000002 PROPOFOL 10 MG/ML EMULSION: Performed by: ANESTHESIOLOGY

## 2019-05-25 PROCEDURE — 43246 EGD PLACE GASTROSTOMY TUBE: CPT | Performed by: SURGERY

## 2019-05-25 PROCEDURE — 25010000002 FENTANYL CITRATE (PF) 100 MCG/2ML SOLUTION: Performed by: ANESTHESIOLOGY

## 2019-05-25 PROCEDURE — 94799 UNLISTED PULMONARY SVC/PX: CPT

## 2019-05-25 PROCEDURE — 25010000003 CEFAZOLIN IN DEXTROSE 2-4 GM/100ML-% SOLUTION: Performed by: SURGERY

## 2019-05-25 PROCEDURE — 25010000002 ONDANSETRON PER 1 MG: Performed by: SURGERY

## 2019-05-25 RX ORDER — ALFENTANIL HYDROCHLORIDE 500 UG/ML
INJECTION INTRAVENOUS
Status: DISPENSED
Start: 2019-05-25 | End: 2019-05-25

## 2019-05-25 RX ORDER — CEFAZOLIN SODIUM 2 G/100ML
2 INJECTION, SOLUTION INTRAVENOUS ONCE
Status: COMPLETED | OUTPATIENT
Start: 2019-05-25 | End: 2019-05-25

## 2019-05-25 RX ORDER — ROCURONIUM BROMIDE 10 MG/ML
INJECTION, SOLUTION INTRAVENOUS AS NEEDED
Status: DISCONTINUED | OUTPATIENT
Start: 2019-05-25 | End: 2019-05-25 | Stop reason: SURG

## 2019-05-25 RX ORDER — DEXTROSE AND SODIUM CHLORIDE 5; .45 G/100ML; G/100ML
50 INJECTION, SOLUTION INTRAVENOUS CONTINUOUS
Status: DISCONTINUED | OUTPATIENT
Start: 2019-05-25 | End: 2019-05-26

## 2019-05-25 RX ORDER — MIDAZOLAM HYDROCHLORIDE 1 MG/ML
0.5 INJECTION INTRAMUSCULAR; INTRAVENOUS
Status: DISCONTINUED | OUTPATIENT
Start: 2019-05-25 | End: 2019-05-25 | Stop reason: HOSPADM

## 2019-05-25 RX ORDER — HYDROMORPHONE HYDROCHLORIDE 1 MG/ML
0.5 INJECTION, SOLUTION INTRAMUSCULAR; INTRAVENOUS; SUBCUTANEOUS
Status: DISCONTINUED | OUTPATIENT
Start: 2019-05-25 | End: 2019-05-29 | Stop reason: HOSPADM

## 2019-05-25 RX ORDER — ONDANSETRON 2 MG/ML
4 INJECTION INTRAMUSCULAR; INTRAVENOUS ONCE AS NEEDED
Status: DISCONTINUED | OUTPATIENT
Start: 2019-05-25 | End: 2019-05-25

## 2019-05-25 RX ORDER — ONDANSETRON 2 MG/ML
4 INJECTION INTRAMUSCULAR; INTRAVENOUS EVERY 6 HOURS PRN
Status: DISCONTINUED | OUTPATIENT
Start: 2019-05-25 | End: 2019-05-29 | Stop reason: HOSPADM

## 2019-05-25 RX ORDER — LIDOCAINE HYDROCHLORIDE 20 MG/ML
INJECTION, SOLUTION INFILTRATION; PERINEURAL AS NEEDED
Status: DISCONTINUED | OUTPATIENT
Start: 2019-05-25 | End: 2019-05-25 | Stop reason: SURG

## 2019-05-25 RX ORDER — NALOXONE HCL 0.4 MG/ML
0.2 VIAL (ML) INJECTION AS NEEDED
Status: DISCONTINUED | OUTPATIENT
Start: 2019-05-25 | End: 2019-05-25

## 2019-05-25 RX ORDER — FLUMAZENIL 0.1 MG/ML
0.2 INJECTION INTRAVENOUS AS NEEDED
Status: DISCONTINUED | OUTPATIENT
Start: 2019-05-25 | End: 2019-05-25

## 2019-05-25 RX ORDER — SODIUM CHLORIDE, SODIUM LACTATE, POTASSIUM CHLORIDE, CALCIUM CHLORIDE 600; 310; 30; 20 MG/100ML; MG/100ML; MG/100ML; MG/100ML
9 INJECTION, SOLUTION INTRAVENOUS CONTINUOUS
Status: DISCONTINUED | OUTPATIENT
Start: 2019-05-25 | End: 2019-05-25

## 2019-05-25 RX ORDER — PROMETHAZINE HYDROCHLORIDE 25 MG/ML
6.25 INJECTION, SOLUTION INTRAMUSCULAR; INTRAVENOUS
Status: DISCONTINUED | OUTPATIENT
Start: 2019-05-25 | End: 2019-05-25

## 2019-05-25 RX ORDER — PROMETHAZINE HYDROCHLORIDE 25 MG/1
25 SUPPOSITORY RECTAL ONCE AS NEEDED
Status: DISCONTINUED | OUTPATIENT
Start: 2019-05-25 | End: 2019-05-25

## 2019-05-25 RX ORDER — EPHEDRINE SULFATE 50 MG/ML
5 INJECTION, SOLUTION INTRAVENOUS ONCE AS NEEDED
Status: DISCONTINUED | OUTPATIENT
Start: 2019-05-25 | End: 2019-05-25

## 2019-05-25 RX ORDER — SODIUM CHLORIDE 0.9 % (FLUSH) 0.9 %
1-10 SYRINGE (ML) INJECTION AS NEEDED
Status: DISCONTINUED | OUTPATIENT
Start: 2019-05-25 | End: 2019-05-25 | Stop reason: HOSPADM

## 2019-05-25 RX ORDER — ACETAMINOPHEN 325 MG/1
650 TABLET ORAL ONCE AS NEEDED
Status: DISCONTINUED | OUTPATIENT
Start: 2019-05-25 | End: 2019-05-25

## 2019-05-25 RX ORDER — PROMETHAZINE HYDROCHLORIDE 25 MG/ML
12.5 INJECTION, SOLUTION INTRAMUSCULAR; INTRAVENOUS ONCE AS NEEDED
Status: DISCONTINUED | OUTPATIENT
Start: 2019-05-25 | End: 2019-05-25

## 2019-05-25 RX ORDER — ONDANSETRON 2 MG/ML
4 INJECTION INTRAMUSCULAR; INTRAVENOUS ONCE
Status: COMPLETED | OUTPATIENT
Start: 2019-05-25 | End: 2019-05-25

## 2019-05-25 RX ORDER — FAMOTIDINE 10 MG/ML
20 INJECTION, SOLUTION INTRAVENOUS ONCE
Status: COMPLETED | OUTPATIENT
Start: 2019-05-25 | End: 2019-05-25

## 2019-05-25 RX ORDER — DIPHENHYDRAMINE HCL 25 MG
25 CAPSULE ORAL
Status: DISCONTINUED | OUTPATIENT
Start: 2019-05-25 | End: 2019-05-25

## 2019-05-25 RX ORDER — PROMETHAZINE HYDROCHLORIDE 25 MG/1
25 TABLET ORAL ONCE AS NEEDED
Status: DISCONTINUED | OUTPATIENT
Start: 2019-05-25 | End: 2019-05-25

## 2019-05-25 RX ORDER — PROPOFOL 10 MG/ML
VIAL (ML) INTRAVENOUS AS NEEDED
Status: DISCONTINUED | OUTPATIENT
Start: 2019-05-25 | End: 2019-05-25 | Stop reason: SURG

## 2019-05-25 RX ORDER — HYDROMORPHONE HYDROCHLORIDE 1 MG/ML
0.5 INJECTION, SOLUTION INTRAMUSCULAR; INTRAVENOUS; SUBCUTANEOUS
Status: DISCONTINUED | OUTPATIENT
Start: 2019-05-25 | End: 2019-05-25

## 2019-05-25 RX ORDER — HYDROCODONE BITARTRATE AND ACETAMINOPHEN 7.5; 325 MG/1; MG/1
1 TABLET ORAL ONCE AS NEEDED
Status: DISCONTINUED | OUTPATIENT
Start: 2019-05-25 | End: 2019-05-25

## 2019-05-25 RX ORDER — HYDRALAZINE HYDROCHLORIDE 20 MG/ML
5 INJECTION INTRAMUSCULAR; INTRAVENOUS
Status: DISCONTINUED | OUTPATIENT
Start: 2019-05-25 | End: 2019-05-25

## 2019-05-25 RX ORDER — FENTANYL CITRATE 50 UG/ML
50 INJECTION, SOLUTION INTRAMUSCULAR; INTRAVENOUS
Status: DISCONTINUED | OUTPATIENT
Start: 2019-05-25 | End: 2019-05-25

## 2019-05-25 RX ORDER — LIDOCAINE HYDROCHLORIDE 10 MG/ML
0.5 INJECTION, SOLUTION EPIDURAL; INFILTRATION; INTRACAUDAL; PERINEURAL ONCE AS NEEDED
Status: DISCONTINUED | OUTPATIENT
Start: 2019-05-25 | End: 2019-05-25 | Stop reason: HOSPADM

## 2019-05-25 RX ORDER — OXYCODONE AND ACETAMINOPHEN 7.5; 325 MG/1; MG/1
1 TABLET ORAL ONCE AS NEEDED
Status: DISCONTINUED | OUTPATIENT
Start: 2019-05-25 | End: 2019-05-25

## 2019-05-25 RX ADMIN — ALFENTANIL HYDROCHLORIDE 250 MCG: 500 INJECTION INTRAVENOUS at 08:33

## 2019-05-25 RX ADMIN — ARFORMOTEROL TARTRATE 15 MCG: 15 SOLUTION RESPIRATORY (INHALATION) at 20:04

## 2019-05-25 RX ADMIN — BUDESONIDE 0.25 MG: 0.25 INHALANT RESPIRATORY (INHALATION) at 06:52

## 2019-05-25 RX ADMIN — SUGAMMADEX 400 MG: 100 INJECTION, SOLUTION INTRAVENOUS at 08:56

## 2019-05-25 RX ADMIN — DEXTROSE AND SODIUM CHLORIDE 50 ML/HR: 5; 450 INJECTION, SOLUTION INTRAVENOUS at 11:19

## 2019-05-25 RX ADMIN — SERTRALINE 25 MG: 25 TABLET, FILM COATED ORAL at 20:15

## 2019-05-25 RX ADMIN — SERTRALINE 25 MG: 25 TABLET, FILM COATED ORAL at 11:34

## 2019-05-25 RX ADMIN — SODIUM CHLORIDE, POTASSIUM CHLORIDE, SODIUM LACTATE AND CALCIUM CHLORIDE 9 ML/HR: 600; 310; 30; 20 INJECTION, SOLUTION INTRAVENOUS at 08:20

## 2019-05-25 RX ADMIN — BUDESONIDE 0.25 MG: 0.25 INHALANT RESPIRATORY (INHALATION) at 20:04

## 2019-05-25 RX ADMIN — SUGAMMADEX 400 MG: 100 INJECTION, SOLUTION INTRAVENOUS at 08:59

## 2019-05-25 RX ADMIN — PHENYLEPHRINE HYDROCHLORIDE 160 MCG: 10 INJECTION INTRAVENOUS at 08:40

## 2019-05-25 RX ADMIN — CEFAZOLIN SODIUM 2 G: 2 INJECTION, SOLUTION INTRAVENOUS at 08:44

## 2019-05-25 RX ADMIN — FUROSEMIDE 20 MG: 20 TABLET ORAL at 11:32

## 2019-05-25 RX ADMIN — HYDROMORPHONE HYDROCHLORIDE 0.5 MG: 1 INJECTION, SOLUTION INTRAMUSCULAR; INTRAVENOUS; SUBCUTANEOUS at 20:15

## 2019-05-25 RX ADMIN — FAMOTIDINE 20 MG: 10 INJECTION INTRAVENOUS at 08:20

## 2019-05-25 RX ADMIN — HYDROMORPHONE HYDROCHLORIDE 0.5 MG: 1 INJECTION, SOLUTION INTRAMUSCULAR; INTRAVENOUS; SUBCUTANEOUS at 15:07

## 2019-05-25 RX ADMIN — FENTANYL CITRATE 12.5 MCG: 50 INJECTION INTRAMUSCULAR; INTRAVENOUS at 10:03

## 2019-05-25 RX ADMIN — ARFORMOTEROL TARTRATE 15 MCG: 15 SOLUTION RESPIRATORY (INHALATION) at 06:52

## 2019-05-25 RX ADMIN — ONDANSETRON HYDROCHLORIDE 4 MG: 2 SOLUTION INTRAMUSCULAR; INTRAVENOUS at 20:55

## 2019-05-25 RX ADMIN — HYDROMORPHONE HYDROCHLORIDE 0.5 MG: 1 INJECTION, SOLUTION INTRAMUSCULAR; INTRAVENOUS; SUBCUTANEOUS at 11:48

## 2019-05-25 RX ADMIN — SUGAMMADEX 200 MG: 100 INJECTION, SOLUTION INTRAVENOUS at 09:02

## 2019-05-25 RX ADMIN — Medication 10 MG: at 20:15

## 2019-05-25 RX ADMIN — PHENYLEPHRINE HYDROCHLORIDE 160 MCG: 10 INJECTION INTRAVENOUS at 08:48

## 2019-05-25 RX ADMIN — PROPOFOL 100 MG: 10 INJECTION, EMULSION INTRAVENOUS at 08:33

## 2019-05-25 RX ADMIN — ROCURONIUM BROMIDE 12 MG: 10 INJECTION INTRAVENOUS at 08:33

## 2019-05-25 RX ADMIN — ONDANSETRON HYDROCHLORIDE 4 MG: 2 SOLUTION INTRAMUSCULAR; INTRAVENOUS at 15:34

## 2019-05-25 RX ADMIN — LIDOCAINE HYDROCHLORIDE 60 MG: 20 INJECTION, SOLUTION INFILTRATION; PERINEURAL at 08:33

## 2019-05-25 RX ADMIN — SODIUM CHLORIDE, POTASSIUM CHLORIDE, SODIUM LACTATE AND CALCIUM CHLORIDE: 600; 310; 30; 20 INJECTION, SOLUTION INTRAVENOUS at 08:27

## 2019-05-25 NOTE — ANESTHESIA PREPROCEDURE EVALUATION
Anesthesia Evaluation     Patient summary reviewed and Nursing notes reviewed   NPO Solid Status: > 8 hours  NPO Liquid Status: > 8 hours           Airway   Mallampati: II  TM distance: >3 FB  Neck ROM: full  No difficulty expected  Dental    (+) edentulous    Pulmonary    (+) pneumonia , a smoker Current Abstained day of surgery, COPD, decreased breath sounds,     ROS comment: BiPAP  PE comment: Most comfortable breathing while sitting up with feet off the side of the bed  Cardiovascular     ECG reviewed  Rhythm: regular  Rate: abnormal        Neuro/Psych    ROS Comment: ALS  GI/Hepatic/Renal/Endo - negative ROS     Musculoskeletal (-) negative ROS    Abdominal  - normal exam   Substance History - negative use     OB/GYN negative ob/gyn ROS         Other - negative ROS                       Anesthesia Plan    ASA 4     general   (Discussed with Ellie Gomez and Maryan.  Plan is for GA with PPv AND PEEP.  Will extubate post-op when fully awake and assisting with  ventilation.  Will go straight to BiPAP.)  intravenous induction   Anesthetic plan, all risks, benefits, and alternatives have been provided, discussed and informed consent has been obtained with: patient.

## 2019-05-25 NOTE — ANESTHESIA POSTPROCEDURE EVALUATION
"Patient: Tino Paul    Procedure Summary     Date:  05/25/19 Room / Location:  [facility] OR  / [facility] MAIN OR    Anesthesia Start:  0827 Anesthesia Stop:  0929    Procedure:  ESOPHAGOGASTRODUODENOSCOPY WITH PERCUTANEOUS ENDOSCOPIC GASTROSTOMY TUBE INSERTION (N/A Esophagus) Diagnosis:       Oropharyngeal dysphagia      ALS (amyotrophic lateral sclerosis) (CMS/HCC)      (Oropharyngeal dysphagia [R13.12])      (ALS (amyotrophic lateral sclerosis) (CMS/HCC) [G12.21])    Surgeon:  Amanda Duarte MD Provider:  Tejas Gomez MD    Anesthesia Type:  general ASA Status:  4          Anesthesia Type: general  Last vitals  BP   121/65 (05/25/19 0945)   Temp   36.8 °C (98.2 °F) (05/25/19 0924)   Pulse   100 (05/25/19 0945)   Resp   26 (05/25/19 0945)     SpO2   95 % (05/25/19 0945)     Post Anesthesia Care and Evaluation    Patient location during evaluation: PACU  Patient participation: complete - patient participated  Level of consciousness: awake and alert  Pain management: adequate  Airway patency: patent  Anesthetic complications: No anesthetic complications    Cardiovascular status: acceptable  Respiratory status: acceptable and BIPAP  Hydration status: acceptable    Comments: /65   Pulse 100   Temp 36.8 °C (98.2 °F) (Oral)   Resp 26   Ht 172.7 cm (68\")   Wt 62.1 kg (136 lb 12.8 oz)   SpO2 95%   BMI 20.80 kg/m²               "

## 2019-05-25 NOTE — ANESTHESIA PROCEDURE NOTES
Airway  Urgency: elective      General Information and Staff    Patient location during procedure: OR  Anesthesiologist: Tejas Gomez MD    Indications and Patient Condition  Indications for airway management: airway protection    Preoxygenated: yes  Mask difficulty assessment: 1 - vent by mask    Final Airway Details  Final airway type: endotracheal airway      Successful airway: ETT  Cuffed: yes   Successful intubation technique: direct laryngoscopy  Endotracheal tube insertion site: oral  Blade: Stanislaw  Blade size: 3  ETT size (mm): 7.5  Cormack-Lehane Classification: grade I - full view of glottis  Placement verified by: chest auscultation and capnometry   Measured from: gums  Number of attempts at approach: 1    Additional Comments  LMA inserted with ease

## 2019-05-26 LAB
ANION GAP SERPL CALCULATED.3IONS-SCNC: 10.3 MMOL/L
BUN BLD-MCNC: 10 MG/DL (ref 8–23)
BUN/CREAT SERPL: 20.4 (ref 7–25)
CALCIUM SPEC-SCNC: 9.3 MG/DL (ref 8.6–10.5)
CHLORIDE SERPL-SCNC: 93 MMOL/L (ref 98–107)
CO2 SERPL-SCNC: 33.7 MMOL/L (ref 22–29)
CREAT BLD-MCNC: 0.49 MG/DL (ref 0.76–1.27)
GFR SERPL CREATININE-BSD FRML MDRD: >150 ML/MIN/1.73
GLUCOSE BLD-MCNC: 105 MG/DL (ref 65–99)
POTASSIUM BLD-SCNC: 3.9 MMOL/L (ref 3.5–5.2)
SODIUM BLD-SCNC: 137 MMOL/L (ref 136–145)

## 2019-05-26 PROCEDURE — 94799 UNLISTED PULMONARY SVC/PX: CPT

## 2019-05-26 PROCEDURE — 99232 SBSQ HOSP IP/OBS MODERATE 35: CPT | Performed by: SURGERY

## 2019-05-26 PROCEDURE — 80048 BASIC METABOLIC PNL TOTAL CA: CPT | Performed by: INTERNAL MEDICINE

## 2019-05-26 PROCEDURE — 25010000002 HEPARIN (PORCINE) PER 1000 UNITS: Performed by: SURGERY

## 2019-05-26 RX ADMIN — ARFORMOTEROL TARTRATE 15 MCG: 15 SOLUTION RESPIRATORY (INHALATION) at 20:05

## 2019-05-26 RX ADMIN — BUDESONIDE 0.25 MG: 0.25 INHALANT RESPIRATORY (INHALATION) at 20:05

## 2019-05-26 RX ADMIN — Medication 10 MG: at 20:55

## 2019-05-26 RX ADMIN — SERTRALINE 25 MG: 25 TABLET, FILM COATED ORAL at 09:50

## 2019-05-26 RX ADMIN — ARFORMOTEROL TARTRATE 15 MCG: 15 SOLUTION RESPIRATORY (INHALATION) at 12:30

## 2019-05-26 RX ADMIN — GUAIFENESIN 600 MG: 600 TABLET, EXTENDED RELEASE ORAL at 09:50

## 2019-05-26 RX ADMIN — HEPARIN SODIUM 5000 UNITS: 5000 INJECTION INTRAVENOUS; SUBCUTANEOUS at 06:21

## 2019-05-26 RX ADMIN — ACETAMINOPHEN 650 MG: 325 TABLET ORAL at 22:35

## 2019-05-26 RX ADMIN — SERTRALINE 25 MG: 25 TABLET, FILM COATED ORAL at 20:55

## 2019-05-26 RX ADMIN — FUROSEMIDE 20 MG: 20 TABLET ORAL at 09:50

## 2019-05-26 RX ADMIN — PANTOPRAZOLE SODIUM 40 MG: 40 TABLET, DELAYED RELEASE ORAL at 09:50

## 2019-05-26 RX ADMIN — SODIUM CHLORIDE, PRESERVATIVE FREE 3 ML: 5 INJECTION INTRAVENOUS at 09:51

## 2019-05-26 RX ADMIN — HEPARIN SODIUM 5000 UNITS: 5000 INJECTION INTRAVENOUS; SUBCUTANEOUS at 22:53

## 2019-05-26 RX ADMIN — DEXTROSE AND SODIUM CHLORIDE 50 ML/HR: 5; 450 INJECTION, SOLUTION INTRAVENOUS at 10:10

## 2019-05-26 RX ADMIN — ACETAMINOPHEN 650 MG: 325 TABLET ORAL at 17:09

## 2019-05-26 RX ADMIN — BUDESONIDE 0.25 MG: 0.25 INHALANT RESPIRATORY (INHALATION) at 12:30

## 2019-05-26 RX ADMIN — HEPARIN SODIUM 5000 UNITS: 5000 INJECTION INTRAVENOUS; SUBCUTANEOUS at 16:57

## 2019-05-26 RX ADMIN — SODIUM CHLORIDE, PRESERVATIVE FREE 3 ML: 5 INJECTION INTRAVENOUS at 22:47

## 2019-05-27 PROCEDURE — 25010000002 HEPARIN (PORCINE) PER 1000 UNITS: Performed by: SURGERY

## 2019-05-27 PROCEDURE — 25010000002 ONDANSETRON PER 1 MG: Performed by: SURGERY

## 2019-05-27 PROCEDURE — 94799 UNLISTED PULMONARY SVC/PX: CPT

## 2019-05-27 PROCEDURE — 94762 N-INVAS EAR/PLS OXIMTRY CONT: CPT

## 2019-05-27 RX ORDER — HYDROCODONE BITARTRATE AND ACETAMINOPHEN 5; 325 MG/1; MG/1
1 TABLET ORAL EVERY 6 HOURS PRN
Status: DISCONTINUED | OUTPATIENT
Start: 2019-05-27 | End: 2019-05-29 | Stop reason: HOSPADM

## 2019-05-27 RX ADMIN — BUDESONIDE 0.25 MG: 0.25 INHALANT RESPIRATORY (INHALATION) at 19:22

## 2019-05-27 RX ADMIN — ACETAMINOPHEN 650 MG: 325 TABLET ORAL at 05:56

## 2019-05-27 RX ADMIN — ARFORMOTEROL TARTRATE 15 MCG: 15 SOLUTION RESPIRATORY (INHALATION) at 19:22

## 2019-05-27 RX ADMIN — ONDANSETRON HYDROCHLORIDE 4 MG: 2 SOLUTION INTRAMUSCULAR; INTRAVENOUS at 19:56

## 2019-05-27 RX ADMIN — HEPARIN SODIUM 5000 UNITS: 5000 INJECTION INTRAVENOUS; SUBCUTANEOUS at 13:58

## 2019-05-27 RX ADMIN — HYDROCODONE BITARTRATE AND ACETAMINOPHEN 1 TABLET: 5; 325 TABLET ORAL at 19:56

## 2019-05-27 RX ADMIN — ARFORMOTEROL TARTRATE 15 MCG: 15 SOLUTION RESPIRATORY (INHALATION) at 08:40

## 2019-05-27 RX ADMIN — SERTRALINE 25 MG: 25 TABLET, FILM COATED ORAL at 08:56

## 2019-05-27 RX ADMIN — HEPARIN SODIUM 5000 UNITS: 5000 INJECTION INTRAVENOUS; SUBCUTANEOUS at 23:33

## 2019-05-27 RX ADMIN — BUDESONIDE 0.25 MG: 0.25 INHALANT RESPIRATORY (INHALATION) at 08:40

## 2019-05-27 RX ADMIN — SERTRALINE 25 MG: 25 TABLET, FILM COATED ORAL at 19:56

## 2019-05-27 RX ADMIN — HEPARIN SODIUM 5000 UNITS: 5000 INJECTION INTRAVENOUS; SUBCUTANEOUS at 05:56

## 2019-05-27 RX ADMIN — FUROSEMIDE 20 MG: 20 TABLET ORAL at 08:56

## 2019-05-27 RX ADMIN — SODIUM CHLORIDE, PRESERVATIVE FREE 3 ML: 5 INJECTION INTRAVENOUS at 20:10

## 2019-05-27 RX ADMIN — Medication 10 MG: at 19:57

## 2019-05-27 RX ADMIN — SODIUM CHLORIDE, PRESERVATIVE FREE 3 ML: 5 INJECTION INTRAVENOUS at 08:58

## 2019-05-28 LAB
ARTERIAL PATENCY WRIST A: POSITIVE
ATMOSPHERIC PRESS: 751.4 MMHG
BASE EXCESS BLDA CALC-SCNC: 7.4 MMOL/L (ref 0–2)
BDY SITE: ABNORMAL
HCO3 BLDA-SCNC: 35.5 MMOL/L (ref 22–28)
MODALITY: ABNORMAL
PCO2 BLDA: 60.6 MM HG (ref 35–45)
PH BLDA: 7.38 PH UNITS (ref 7.35–7.45)
PO2 BLDA: 56.3 MM HG (ref 80–100)
SAO2 % BLDCOA: 87.1 % (ref 92–99)
TOTAL RATE: 20 BREATHS/MINUTE

## 2019-05-28 PROCEDURE — 25010000002 HEPARIN (PORCINE) PER 1000 UNITS: Performed by: SURGERY

## 2019-05-28 PROCEDURE — 94660 CPAP INITIATION&MGMT: CPT

## 2019-05-28 PROCEDURE — 82803 BLOOD GASES ANY COMBINATION: CPT

## 2019-05-28 PROCEDURE — 94799 UNLISTED PULMONARY SVC/PX: CPT

## 2019-05-28 PROCEDURE — 36600 WITHDRAWAL OF ARTERIAL BLOOD: CPT

## 2019-05-28 RX ADMIN — HYDROCODONE BITARTRATE AND ACETAMINOPHEN 1 TABLET: 5; 325 TABLET ORAL at 06:42

## 2019-05-28 RX ADMIN — BUDESONIDE 0.25 MG: 0.25 INHALANT RESPIRATORY (INHALATION) at 08:38

## 2019-05-28 RX ADMIN — SODIUM CHLORIDE, PRESERVATIVE FREE 3 ML: 5 INJECTION INTRAVENOUS at 09:32

## 2019-05-28 RX ADMIN — HYDROCODONE BITARTRATE AND ACETAMINOPHEN 1 TABLET: 5; 325 TABLET ORAL at 01:27

## 2019-05-28 RX ADMIN — Medication 10 MG: at 20:59

## 2019-05-28 RX ADMIN — ARFORMOTEROL TARTRATE 15 MCG: 15 SOLUTION RESPIRATORY (INHALATION) at 08:38

## 2019-05-28 RX ADMIN — ARFORMOTEROL TARTRATE 15 MCG: 15 SOLUTION RESPIRATORY (INHALATION) at 19:08

## 2019-05-28 RX ADMIN — SODIUM CHLORIDE, PRESERVATIVE FREE 3 ML: 5 INJECTION INTRAVENOUS at 21:07

## 2019-05-28 RX ADMIN — HEPARIN SODIUM 5000 UNITS: 5000 INJECTION INTRAVENOUS; SUBCUTANEOUS at 14:26

## 2019-05-28 RX ADMIN — HYDROCODONE BITARTRATE AND ACETAMINOPHEN 1 TABLET: 5; 325 TABLET ORAL at 12:34

## 2019-05-28 RX ADMIN — FUROSEMIDE 20 MG: 20 TABLET ORAL at 09:31

## 2019-05-28 RX ADMIN — HYDROCODONE BITARTRATE AND ACETAMINOPHEN 1 TABLET: 5; 325 TABLET ORAL at 18:11

## 2019-05-28 RX ADMIN — BUDESONIDE 0.25 MG: 0.25 INHALANT RESPIRATORY (INHALATION) at 19:08

## 2019-05-28 RX ADMIN — SERTRALINE 25 MG: 25 TABLET, FILM COATED ORAL at 09:31

## 2019-05-28 RX ADMIN — HEPARIN SODIUM 5000 UNITS: 5000 INJECTION INTRAVENOUS; SUBCUTANEOUS at 22:11

## 2019-05-28 RX ADMIN — HEPARIN SODIUM 5000 UNITS: 5000 INJECTION INTRAVENOUS; SUBCUTANEOUS at 06:02

## 2019-05-28 RX ADMIN — SERTRALINE 25 MG: 25 TABLET, FILM COATED ORAL at 20:59

## 2019-05-29 VITALS
SYSTOLIC BLOOD PRESSURE: 90 MMHG | BODY MASS INDEX: 20.95 KG/M2 | RESPIRATION RATE: 18 BRPM | DIASTOLIC BLOOD PRESSURE: 61 MMHG | TEMPERATURE: 98 F | HEIGHT: 68 IN | HEART RATE: 101 BPM | WEIGHT: 138.23 LBS | OXYGEN SATURATION: 95 %

## 2019-05-29 PROCEDURE — 25010000002 HEPARIN (PORCINE) PER 1000 UNITS: Performed by: SURGERY

## 2019-05-29 PROCEDURE — 94799 UNLISTED PULMONARY SVC/PX: CPT

## 2019-05-29 RX ORDER — ACETAMINOPHEN 325 MG/1
650 TABLET ORAL EVERY 4 HOURS PRN
Start: 2019-05-29

## 2019-05-29 RX ORDER — HYDROCODONE BITARTRATE AND ACETAMINOPHEN 5; 325 MG/1; MG/1
1 TABLET ORAL EVERY 6 HOURS PRN
Qty: 30 TABLET | Refills: 0 | Status: SHIPPED | OUTPATIENT
Start: 2019-05-29 | End: 2019-06-06

## 2019-05-29 RX ADMIN — HYDROCODONE BITARTRATE AND ACETAMINOPHEN 1 TABLET: 5; 325 TABLET ORAL at 17:37

## 2019-05-29 RX ADMIN — FUROSEMIDE 20 MG: 20 TABLET ORAL at 08:39

## 2019-05-29 RX ADMIN — HYDROCODONE BITARTRATE AND ACETAMINOPHEN 1 TABLET: 5; 325 TABLET ORAL at 05:55

## 2019-05-29 RX ADMIN — HEPARIN SODIUM 5000 UNITS: 5000 INJECTION INTRAVENOUS; SUBCUTANEOUS at 14:56

## 2019-05-29 RX ADMIN — BUDESONIDE 0.25 MG: 0.25 INHALANT RESPIRATORY (INHALATION) at 08:40

## 2019-05-29 RX ADMIN — SODIUM CHLORIDE, PRESERVATIVE FREE 3 ML: 5 INJECTION INTRAVENOUS at 08:39

## 2019-05-29 RX ADMIN — HYDROCODONE BITARTRATE AND ACETAMINOPHEN 1 TABLET: 5; 325 TABLET ORAL at 00:05

## 2019-05-29 RX ADMIN — SERTRALINE 25 MG: 25 TABLET, FILM COATED ORAL at 08:39

## 2019-05-29 RX ADMIN — HYDROCODONE BITARTRATE AND ACETAMINOPHEN 1 TABLET: 5; 325 TABLET ORAL at 11:34

## 2019-05-29 RX ADMIN — IPRATROPIUM BROMIDE AND ALBUTEROL SULFATE 3 ML: 2.5; .5 SOLUTION RESPIRATORY (INHALATION) at 01:04

## 2019-05-29 RX ADMIN — ARFORMOTEROL TARTRATE 15 MCG: 15 SOLUTION RESPIRATORY (INHALATION) at 08:40

## 2019-05-29 RX ADMIN — HEPARIN SODIUM 5000 UNITS: 5000 INJECTION INTRAVENOUS; SUBCUTANEOUS at 05:55

## 2019-05-30 ENCOUNTER — READMISSION MANAGEMENT (OUTPATIENT)
Dept: CALL CENTER | Facility: HOSPITAL | Age: 59
End: 2019-05-30

## 2019-05-30 NOTE — OUTREACH NOTE
Prep Survey      Responses   Facility patient discharged from?  [facility]   Is patient eligible?  Yes   Discharge diagnosis  Acute on chronic hypoxemic respiratory failure Dysphagia, s/p PEG tube 5/25/19 Hypoventilation, secondary to COPD and neuromuscular disease   Does the patient have one of the following disease processes/diagnoses(primary or secondary)?  Other   Is there a DME ordered?  Yes   What DME was ordered?  [place] for home O2  [employer] for TF, and [employer] for cough assist  device.   Prep survey completed?  Yes          Tamra Barney RN

## 2019-06-01 ENCOUNTER — READMISSION MANAGEMENT (OUTPATIENT)
Dept: CALL CENTER | Facility: HOSPITAL | Age: 59
End: 2019-06-01

## 2019-06-01 NOTE — OUTREACH NOTE
Medical Week 1 Survey      Responses   Facility patient discharged from?  [facility]   Does the patient have one of the following disease processes/diagnoses(primary or secondary)?  Other   Is there a successful TCM telephone encounter documented?  No   Week 1 attempt successful?  Yes   Call start time  1352   Call end time  1429   Is patient permission given to speak with other caregiver?  Yes   Person spoke with today (if not patient) and relationship  Yenny  Dtr   Meds reviewed with patient/caregiver?  Yes   Is the patient having any side effects they believe may be caused by any medication additions or changes?  No   Does the patient have all medications ordered at discharge?  Yes   Is the patient taking all medications as directed (includes completed medication regime)?  Yes   Does the patient have a primary care provider?   Yes   Does the patient have an appointment with their PCP within 7 days of discharge?  Greater than 7 days   What is preventing the patient from scheduling follow up appointments within 7 days of discharge?  Earlier appointment not available, Unsure of when or with whom [BiPAP most of the time d/t ALS,  dtr is looking into MDs to come to home.]   Nursing Interventions  Educated patient on importance of making appointment   Has the patient kept scheduled appointments due by today?  N/A   Has home health visited the patient within 72 hours of discharge?  N/A   Home health comments  She will discuss HH with Pcp on Monday   What DME was ordered?  [place] for home O2  [employer] for TF, and [employer] for cough assist  device.   Psychosocial issues?  Yes   Psychosocial comments  He is late stage ALS.   Did the patient receive a copy of their discharge instructions?  Yes   Nursing interventions  Reviewed instructions with patient   What is the patient's perception of their health status since discharge?  Same   Is the patient/caregiver able to teach back signs and symptoms related to disease process  for when to call PCP?  Yes   Is the patient/caregiver able to teach back signs and symptoms related to disease process for when to call 911?  Yes   Is the patient/caregiver able to teach back the hierarchy of who to call/visit for symptoms/problems? PCP, Specialist, Home health nurse, Urgent Care, ED, 911  Yes   Additional teach back comments  She is overwhelmed caring for her dad.  He is doing ok, but she knows he is sliding down a steep slope with ALS. [Long discussion on all areas of need with dtr.  Encouraged to reach out to office managers and contact people at agencies.  Will discus Zoloft to 50 mg w/ Dr. GIBBS as well as ask when to call Hospice and providers who come to home.  ]   Week 1 call completed?  Yes          Vane Tejada RN

## 2019-06-05 ENCOUNTER — TRANSCRIBE ORDERS (OUTPATIENT)
Dept: ADMINISTRATIVE | Facility: HOSPITAL | Age: 59
End: 2019-06-05

## 2019-06-05 DIAGNOSIS — R91.8 PULMONARY INFILTRATE: Primary | ICD-10-CM

## 2019-06-06 NOTE — DISCHARGE PLACEMENT REQUEST
"Tino Paul (59 y.o. Male)     Date of Birth Social Security Number Address Home Phone MRN    1960  [address]  [place] [address] [phone] [id number]    [employer] Mar[employer] Status          None        Admission Date Admission Type Admitting Provider Attending Provider Department, Room/Bed    5/21/19 Emergency Trae Garcia MD  [address], [address]/1    Discharge Date Discharge Disposition Discharge Destination        5/29/2019 Home or Self Care              Attending Provider:  (none)   Allergies:  No Known Allergies    Isolation:  None   Infection:  None   Code Status:  Prior    Ht:  172.7 cm (68\")   Wt:  62.7 kg (138 lb 3.7 oz)    Admission Cmt:  None   Principal Problem:  None                Active Insurance as of 5/21/2019     Patient has no active insurance coverage on file for 5/21/2019.          Emergency Contacts      (Rel.) Home Phone Work Phone Mobile Phone    Yenny Childers (Daughter) [phone] -- --    Faizan Paul (Son) -- -- [phone]               Nutrition Notes (all)      Lydia Sharma RD at 5/28/2019  1:25 PM        Nutrition Services    Patient Name:  Tino Paul  YOB: 1960  MRN: [id number]  Admit Date:  5/21/2019    Follow up.  Spoke with RN this am, she reports patient did not tolerate first bolus feed yesterday very well.  Patient was started at goal bolus of 330 ml. She reports patient was given a bolus at 6 am today of 225 ml and tolerated this bolus much better.      Recommend trying bolus of 225 ml after lunch today and after dinner then attempting to advance to 330 ml tonight at HS depending on patient's tolerance of other 2 boluses today.  Edited orders in chart.    RD to continue to follow.    Electronically signed by:  Lydia Sharma RD  05/28/19 1:25 PM     Electronically signed by Lydia Sharma RD at 5/28/2019  1:27 PM     Krystal Pisano RD at 5/27/2019  9:04 AM      Consult Orders    1. " Inpatient Nutrition Consult [[id number]] ordered by Salvatore Gallagher MD at 05/26/19 1625              Nutrition Services    Patient Name:  Tino Paul  YOB: 1960  MRN: [id number]  Admit Date:  5/21/2019    Consult received for Bolus recommendations via PEG. Recommend Nutren 1.5 330cc x4 per day with 300cc water per bolus feed. This will meet nutritional needs alone. Will continue to follow and remain available as needed.    Electronically signed by:  Krystal Pisano RD  05/27/19 9:05 AM     Electronically signed by Krystal Pisano RD at 5/27/2019  9:08 AM     Alma Wiggins RD at 5/25/2019  1:40 PM          Problem: Nutrition, Imbalanced: Inadequate Oral Intake (Adult)  Intervention: Promote/Optimize Nutrition   05/25/19 1339   Nutrition Interventions   Oral Nutrition Promotion other (see comments)  (EN via PEG, pt/caregiver education on TF)     Pt eating ground food prior to admission in small/minimal amounts.     Goal: Identify Related Risk Factors and Signs and Symptoms  Outcome: Outcome(s) achieved Date Met: 05/25/19 05/25/19 1339   Nutrition, Imbalanced: Inadequate Oral Intake (Adult)   Related Risk Factors (Nutrition Imbalance, Inadequate Oral Intake) chronic illness/infection;eating difficulty   Signs and Symptoms (Nutrition Imbalance, Inadequate Oral Intake: Signs and Symptoms) weight decreased (percent weight loss, percent usual body weight, body mass index less than 18.5) (Adults)     Goal: Prevent Further Weight Loss  Outcome: Ongoing (interventions implemented as appropriate)   05/25/19 1339   Nutrition, Imbalanced: Inadequate Oral Intake (Adult)   Prevent Further Weight Loss other (see comments)  (PEG placed 5/25)           Electronically signed by Alma Wiggins RD at 5/25/2019  1:40 PM     Alma Wiggins RD at 5/25/2019  1:23 PM      Consult Orders    1. Inpatient Consult to Nutrition Services (Dietician) [[id number]] ordered by Amanda Duarte MD at 05/25/19 0904             "    Adult Nutrition  Assessment/PES    Patient Name:  Tino Paul  YOB: 1960  MRN: [id number]  Admit Date:  5/21/2019    Assessment Date:  5/25/2019    Comments:  TF consult    ALS patient with disease progression, admitted on 5/22 with worsening respiratory issues and dysphagia. He has lost a net of 37 lb since Sept. 2018, 23 lb just since March 2019. He presents with severe malnutrition. Daughter indicates he's been able to \"drink some Dr. Pepper and eat [employer] sliders...Jell-0, puddings, ground foods\" until this week - his swallowing function has significantly declined to point of needing feeding tube. PEG was placed earlier today by Dr. Duarte.     Orders indicate to start EN on 5/26/19 at 8:00 am at 10 cc/hr and advance by 10 cc Q 4 hours to goal rate. Will order Nutren 1.5, goal rate 55 cc/hr. Water flushes 50 cc/hr. This has been reviewed with DEBBIE Garcia as well as pt and his daughter. I also discussed possibility of transitioning him to a gravity/bolus regimen for home once we establish tolerance of Nutren 1.5 @ goal rate. IV fluids can be dc once pt's TF started.     Daughter is pt's 1* caregiver. She indicates they've used [facility] home [facility] in past. They live in [facility]. RD will continue to follow closely while here. Encouraged daughter & pt to ask any questions as it relates to TF.     Reason for Assessment     Row Name 05/25/19 1225          Reason for Assessment    Reason For Assessment  TF/PN;physician consult     Diagnosis  pulmonary disease;neurologic conditions ALS, dysphagia; acute/chronic resp failure     Identified At Risk by Screening Criteria  tube feeding or parenteral nutrition         Nutrition/Diet History     Row Name 05/25/19 1231          Nutrition/Diet History    Typical Food/Fluid Intake  Currently NPO. No prior PO data recorded since adm on 5/22/19. Pt with ALS, progressively deterioated swallow function (oropharyngeal dysphagia). Severe weight " "loss since Sept 2018. He had PEG placed today. Surgeon indicates ok to start feeds at 8 am tomorrow @ 10 cc/hr and adv by 10 cc Q 4 hr to goal rate set by RD.     Food Preferences  Dtr Yenny states pt was able to drink Dr. Pepper but not water b/c it choked him (?) and she ground up his meats at home; did best with jello, pudding or applesauce. He could barely swallow anything, however, in the past 5 days which is why they went ahead with PEG now vs. in June as previously scheduled.      Factors Affecting Nutritional Intake  chewing difficulties/inability to chew food;difficulty/impaired swallowing;compromised airway;pain;other (see comments) Side note: dtr states they live in [facility], have used [facility] home [facility] in past & liked them. Relayed info to pt's RN Radha. Pt will need supplies for PEG and EN formula.          Anthropometrics     Row Name 05/25/19 1228          Admit Weight    Admit Weight Method  measured     Admit Weight  62.1 kg (137 lb), Ht 68\"        Usual Body Weight (UBW)    Usual Body Weight  78.9 kg (174 lb) 9/23/18     Weight Loss  unintentional     Weight Loss Time Frame 37 lb loss (21%/8 mos),   23 lb loss (14%/3 mos)        Body Mass Index (BMI)    BMI Assessment  BMI 18.5-24.9: normal         Labs/Tests/Procedures/Meds     Row Name 05/25/19 1226          Labs/Procedures/Meds    Lab Results Reviewed  reviewed, pertinent        Diagnostic Tests/Procedures    Diagnostic Test/Procedure Reviewed  reviewed, pertinent     Diagnostic Test/Procedures Comments  Esophagogastroduodenoscopy with percutaneous endoscopic gastrostomy tube placement - 5/25/19 (today)        Medications    Pertinent Medications Reviewed  reviewed, pertinent     Pertinent Medications Comments  diuretic, ppi         Physical Findings     Row Name 05/25/19 1230          Physical Findings    Overall Physical Appearance Aphasic; on oxygen therapy;generalized wasting;loss of muscle mass;loss of subcutaneous fat bipap/cpap - uses " almost continuously     Gastrointestinal  feeding tube     Tubes  PEG         Estimated/Assessed Needs     Row Name 05/25/19 1240          Estimated/Assessed Needs    Additional Documentation  Calorie Requirements (Group);Fluid Requirements (Group);Protein Requirements (Group)        Calorie Requirements    Weight Used For Calorie Calculations  62.1 kg (136 lb 14.5 oz)     Estimated Calorie Need Method  kcal/kg     Estimated Calorie Requirement Comment  8326-0922 kcal (30-35 lauren/kg due to weight loss), 74-93 gm protein (1.2-1.5 gm/kg), 2173 cc fluid        Protein Requirements    Est Protein Requirement Amount (gms/kg)  1.2 gm protein        Fluid Requirements    Estimated Fluid Requirements (mL/day)  2173     Estimated Fluid Requirement Method  RDA Method     RDA Method (mL)  2173         Nutrition Prescription Ordered     Row Name 05/25/19 1242          Nutrition Prescription PO    Current PO Diet  NPO                 Problem/Interventions:  Problem 1     Row Name 05/25/19 1243          Nutrition Diagnoses Problem 1    Problem 1  Needs Alternate Route + Malnutrition     Neurological  ALS;Dysphagia     Signs/Symptoms (evidenced by)  Unintended Weight Change     Unintended Weight Change  Loss     Number of Pounds Lost  37 lb/8 mos, 23 lb/3 mos                 Intervention Goal     Row Name 05/25/19 1240          Intervention Goal    General  Maintain nutrition;Nutrition support treatment     TF/PN  Inititiate TF/PN;Tolerate TF at goal;Other (comment) Will likely need transition to bolus regimen @ dc; MD currently wants pt on cont drip     Weight  Appropriate weight gain         Nutrition Intervention     Row Name 05/25/19 1243          Nutrition Intervention    RD/Tech Action  Recommend/ordered;Follow Tx progress;Care plan reviewd     Recommended/Ordered  EN         Nutrition Prescription     Row Name 05/25/19 124          Nutrition Prescription EN    Enteral Prescription  Enteral begin/change;Enteral to supply      Enteral Route  PEG     Product  Other (comment) Nutren 1.5     TF Delivery Method  Continuous     Continuous TF Goal Rate (mL/hr)  55 mL/hr     Continuous TF Starting Rate (mL/hr)  10 mL/hr at 8:00 am on 5/26/19 per surgeon's orders; adv by 10 cc q 4 hour to goal rate     Water flush (mL)   50 mL     Water Flush Frequency  Per hour     New EN Prescription Ordered?  Yes        EN to Supply    Kcal/Day  1980 Kcal/Day     Kcal/Kg  31.8 Kcal/Kg     Protein (gm/day)  89 gm/day     Meet Estimated Kcal Need (%)  100 %     Meet Estimated Protein Need (%)  100 %     TF Free H2O (mL)  1003 mL     Total Free H2O (mL/day)  2203 mL/day        Other Orders    Other  When ready for, or if bolus regimen indicated for dc, rec 360 cc x 4/day of Nutren 1.5. 150 cc water flush before/after q feed.         Education/Evaluation     Row Name 05/25/19 1248          Education    Education  Will Instruct as appropriate;Education topics     Education Topics  TF instruction;Dysphagia   met with daughter, Yenny; pt listened from his bed - he can communicate via touch pad        Monitor/Evaluation    Monitor  Per protocol     Education Follow-up  Reinforce PRN           Electronically signed by:  Alma Wiggins RD  05/25/19 1:23 PM    Electronically signed by Alma Wiggins RD at 5/25/2019  1:33 PM     Alma Wiggins RD at 5/25/2019  1:19 PM          Malnutrition Severity Assessment    Patient Name:  Tino Paul  YOB: 1960  MRN: [id number]  Admit Date:  5/21/2019    Patient meets criteria for : Severe Malnutrition    Comments: Meets criteria for severe malnutrition in areas of weight loss, energy intake and physical assessment as detailed below. Pt with ALS, progressive decline x 8 months. Daughter states pt's oral intake minimal d/t swallowing difficulty over past weeks.     See full RD assessment in separate note.     Malnutrition Severity Assessment  Malnutrition Type: Chronic Disease - Related Malnutrition      Malnutrition Type (last 8 hours)      Malnutrition Severity Assessment     Row Name 05/25/19 1315       Malnutrition Severity Assessment    Malnutrition Type  Chronic Disease - Related Malnutrition    Row Name 05/25/19 1315       Insufficient Energy Intake     Insufficient Energy Intake   <50% of est. energy requirement for >or equal to 1 month    Row Name 05/25/19 1315       Unintentional Weight Loss     Unintentional Weight Loss   Weight loss greater than 7.5% in three months 14% x 3 mos, 21% x 8 months    Row Name 05/25/19 1315       Muscle Loss    Loss of Muscle Mass Findings  Moderate    Clavicle Bone Region  Moderate - some protrusion in females, visible in males    Patellar Region  Moderate - patella more prominent, less muscle definition around patella    Anterior Thigh Region  Severe - line/depression along thigh, obviously thin    Posterior Calf Region  Severe - thin with very little definition/firmness    Row Name 05/25/19 1315       Fat Loss    Subcutaneous Fat Loss Findings  Moderate    Orbital Region   Moderate -  somewhat hollowness, slightly dark circles    Row Name 05/25/19 1315       Criteria Met (Must meet criteria for severity in at least 2 of these categories: M Wasting, Fat Loss, Fluid, Secondary Signs, Wt. Status, Intake)    Patient meets criteria for   Severe Malnutrition          Electronically signed by:  Alma Wiggins RD  05/25/19 1:19 PM    Electronically signed by Alma Wiggins RD at 5/25/2019  1:21 PM

## 2019-06-09 ENCOUNTER — READMISSION MANAGEMENT (OUTPATIENT)
Dept: CALL CENTER | Facility: HOSPITAL | Age: 59
End: 2019-06-09

## 2019-06-09 NOTE — OUTREACH NOTE
Medical Week 2 Survey      Responses   Facility patient discharged from?  [facility]   Does the patient have one of the following disease processes/diagnoses(primary or secondary)?  Other   Week 2 attempt successful?  Yes   Call start time  1544   Discharge diagnosis  Acute on chronic hypoxemic respiratory failure Dysphagia, s/p PEG tube 5/25/19 Hypoventilation, secondary to COPD and neuromuscular disease   Call end time  1551   Person spoke with today (if not patient) and relationship  dts, Yenny   Meds reviewed with patient/caregiver?  Yes   Is the patient having any side effects they believe may be caused by any medication additions or changes?  No   Does the patient have all medications ordered at discharge?  Yes   Is the patient taking all medications as directed (includes completed medication regime)?  Yes   Medication comments  PCP increased anxiety meds at last appt   Does the patient have a primary care provider?   Yes   Has the patient kept scheduled appointments due by today?  Yes   Comments  seen by Dr Small last week.    Home health comments  pt continues to decline HH.  Dtr states PCP said he will order it if he changes his mind   What DME was ordered?  [facility] for home O2  [employer] for TF, and [employer] for cough assist  device.   DME comments  waiting on insurance approval for cough assist device.    Psychosocial issues?  No   Comments  Dtr states a friend from out of town came to visit and pt was able to get out of house with him. Doing well.  Denies any SOA  Went home on 4L O2 but has been able to decrease to 1-2L    Did the patient receive a copy of their discharge instructions?  Yes   Nursing interventions  Reviewed instructions with patient   What is the patient's perception of their health status since discharge?  Improving   Is the patient/caregiver able to teach back signs and symptoms related to disease process for when to call PCP?  Yes   Is the patient/caregiver able to teach back signs and  symptoms related to disease process for when to call 911?  Yes   Is the patient/caregiver able to teach back the hierarchy of who to call/visit for symptoms/problems? PCP, Specialist, Home health nurse, Urgent Care, ED, 911  Yes   Additional teach back comments  Dtr sounded more cheerful and happy he is improving.    Week 2 Call Completed?  Yes          Nohemi Padilla RN

## 2019-06-17 ENCOUNTER — READMISSION MANAGEMENT (OUTPATIENT)
Dept: CALL CENTER | Facility: HOSPITAL | Age: 59
End: 2019-06-17

## 2019-06-17 NOTE — OUTREACH NOTE
Medical Week 3 Survey      Responses   Facility patient discharged from?  [facility]   Does the patient have one of the following disease processes/diagnoses(primary or secondary)?  Other   Week 3 attempt successful?  Yes   Call start time  0929   Call end time  0938   General alerts for this patient  Late stage ALS   Discharge diagnosis  Acute on chronic hypoxemic respiratory failure Dysphagia, s/p PEG tube 5/25/19 Hypoventilation, secondary to COPD and neuromuscular disease   Person spoke with today (if not patient) and relationship  Yenny Alonzos reviewed with patient/caregiver?  Yes   Is the patient having any side effects they believe may be caused by any medication additions or changes?  No   Does the patient have all medications ordered at discharge?  Yes   Is the patient taking all medications as directed (includes completed medication regime)?  Yes   Has the patient kept scheduled appointments due by today?  Yes   What is the Home health agency?   Patient does not want home health.   Has home health visited the patient within 72 hours of discharge?  N/A   What DME was ordered?  BiPap with O2 as needed.    Psychosocial issues?  No   Comments  Tube Feedings as a supplement, about 3 times a day. Depending on how much he eats.   What is the patient's perception of their health status since discharge?  Improving [Daughter says he has improved, Bipap/O2 use has decreased.]   Week 3 Call Completed?  Yes          Patricia Bedolla RN

## 2019-06-18 ENCOUNTER — TELEPHONE (OUTPATIENT)
Dept: SURGERY | Facility: CLINIC | Age: 59
End: 2019-06-18

## 2019-06-18 NOTE — TELEPHONE ENCOUNTER
Pt had feeding tube put in 5/25. Pt now complaining of sharp pains when feeding takes place. Please advise. Thank you.

## 2019-06-18 NOTE — TELEPHONE ENCOUNTER
If there is no sign of erythema surrounding the tube at the skin level, I am unsure why he would be having sharp pains with tube feeds.  If the pain gets worse, he would be best suited coming to the emergency room given his advanced ALS (Leonela Gehrig's disease) requiring fairly continuous BiPAP administration for adequate oxygenation.  He is not suitable for evaluation in the office.  If there is surrounding skin erythema adjacent to the tube, he may have a soft tissue abscess requiring incision and drainage.  This would be best suited in the ER, again due to his respiratory issues related to his ALS.

## 2019-06-25 ENCOUNTER — APPOINTMENT (OUTPATIENT)
Dept: CT IMAGING | Facility: HOSPITAL | Age: 59
End: 2019-06-25

## 2019-06-25 ENCOUNTER — READMISSION MANAGEMENT (OUTPATIENT)
Dept: CALL CENTER | Facility: HOSPITAL | Age: 59
End: 2019-06-25

## 2019-06-25 NOTE — OUTREACH NOTE
Medical Week 4 Survey      Responses   Facility patient discharged from?  [facility]   Does the patient have one of the following disease processes/diagnoses(primary or secondary)?  Other   Week 4 attempt successful?  Yes   Call start time  1153   Call end time  1159   General alerts for this patient  Late stage ALS   Discharge diagnosis  Acute on chronic hypoxemic respiratory failure Dysphagia, s/p PEG tube 5/25/19 Hypoventilation, secondary to COPD and neuromuscular disease   Person spoke with today (if not patient) and relationship  Yenny   Meds reviewed with patient/caregiver?  Yes   Is the patient taking all medications as directed (includes completed medication regime)?  Yes   Has the patient kept scheduled appointments due by today?  Yes   Comments  Neurologist is arranging HH or hospice.    Is the patient still receiving Home Health Services?  N/A   Home health comments   Dtr states PCP said he will order it if he changes his mind   DME comments  waiting on insurance approval for cough assist device. Using Bipap and O2. Starting to need the O2 less    Psychosocial issues?  No   Psychosocial comments  He is late stage ALS.   Comments  Tube Feedings as a supplement, about 3 times a day. Feeding Tube is causing pain and appears to be blocking up periodically per dtr.    What is the patient's perception of their health status since discharge?  Improving   Is the patient/caregiver able to teach back signs and symptoms related to disease process for when to call PCP?  Yes   Is the patient/caregiver able to teach back signs and symptoms related to disease process for when to call 911?  Yes   Week 4 Call Completed?  Yes   Would the patient like one additional call?  No   Graduated  Yes   Did the patient feel the follow up calls were helpful during their recovery period?  Yes   Was the number of calls appropriate?  Yes          Belia Pearce RN

## 2019-06-28 ENCOUNTER — OFFICE VISIT (OUTPATIENT)
Dept: SURGERY | Facility: CLINIC | Age: 59
End: 2019-06-28

## 2019-06-28 VITALS — OXYGEN SATURATION: 95 % | HEIGHT: 68 IN | HEART RATE: 88 BPM | BODY MASS INDEX: 21.02 KG/M2

## 2019-06-28 DIAGNOSIS — K94.23 PEG TUBE MALFUNCTION (HCC): Primary | ICD-10-CM

## 2019-06-28 PROCEDURE — 99213 OFFICE O/P EST LOW 20 MIN: CPT | Performed by: SURGERY

## 2019-07-02 NOTE — PROGRESS NOTES
General Surgery  Established Patient Office Visit    CC: PEG tube malfunction    HPI:  Mr. Paul is a 59-year-old gentleman with known amyotrophic lateral sclerosis (Leonela Gehrig's disease) with resulting difficulty swallowing and breathing.  He has a fairly good quality of life but uses BiPAP continuously whenever he is supine.  I recently met him in the hospital and he was admitted to pulmonology service with acute on chronic respiratory failure.  He at that time had reported fairly rapid decrease in his oral intake to the point that he was only able to tolerate liquid protein supplements.  I then placed a PEG tube for him on May 25 but he has been unable to flush the tube for the last 2 weeks and has simply been not using it.  He came to see me, and on evaluation here in the office I was unable to flush any water through the tube as it was completely occluded.    Past Medical History:  Amyotrophic lateral sclerosis  COPD    Past Surgical History:  Laparoscopic cholecystectomy  EGD with PEG tube (May 25, 2019)    Medications:  Tylenol as needed  Arformoterol inhaler  Budesonide inhaler  Lasix  Guaifenesin  DuoNeb  Melatonin  Nicotine patch  Protonix  Zoloft    Allergies: No known drug allergies    Family History: Mother with dementia and Parkinson's disease    Social History: , former smoker, no regular alcohol use    ROS:  Constitutional: Negative for fevers or chills  HENT: Negative for hearing loss or runny nose  Eyes: Negative for vision changes or scleral icterus  Respiratory: Positive for chronic shortness of breath; Negative for cough or wheezing  Cardiovascular: Negative for chest pain or heart palpitations  Gastrointestinal: Positive for dysphasia; Negative for abdominal pain, nausea, vomiting, constipation, melena, or hematochezia  Genitourinary: Negative for hematuria or dysuria  Musculoskeletal: Positive for weakness and muscular atrophy; Negative for joint swelling or gait  instability  Neurologic: Negative for tremors or seizures  Psychiatric: Negative for suicidal ideations or depression  All other systems reviewed and negative    Physical Exam:  Vitals:    06/28/19 0858   Pulse: 88   SpO2: 95%     General: No acute distress, well-nourished & well-developed  HEAD: normocephalic, atraumatic  EYES: normal conjunctiva, sclera anicteric  EARS: grossly normal hearing  NECK: supple, no thyromegaly  CARDIOVASCULAR: regular rate and rhythm  RESPIRATORY: clear to auscultation bilaterally  GASTROINTESTINAL: soft, nontender, non-distended, PEG tube in left upper quadrant capped and completely occluded during attempted water flushing  MUSCULOSKELETAL: normal gait and station. No gross extremity abnormalities  PSYCHIATRIC: oriented x3, normal mood and affect    IMAGING:  None    ASSESSMENT & PLAN  Mr. Paul is a 59-year-old gentleman with an occluded PEG tube.  I was unable to dislodge the occlusion with flushing today.  I removed the tube easily at the bedside and attempted to replace it with an 18 Bulgarian UMAIR G-tube but could not get the tube to thread beyond the muscular fascia into the stomach.  I presented him with multiple options including taking him to the OR tomorrow for PEG tube replacement under general anesthesia given his likelihood for respiratory compromise with even a small amount of sedation versus simply leaving the PEG tube out and letting him eat as much as he can.  He says he wishes to leave the PEG tube out and will call me back in the future once he gets to the point that he cannot tolerate any oral nutrition any longer.  I spoke with his son who is here at the bedside with him as well as his daughter over the telephone, and they are in agreement with this plan.  I applied an occlusive dressing over the PEG tube site and advised him to keep this tract covered until tomorrow.    Amanda Duarte MD  General and Endoscopic Surgery  [facility]    [address], [address]  [place], [place], [address]  P: [phone]  F: [phone]

## 2021-05-28 VITALS
HEIGHT: 70 IN | HEART RATE: 97 BPM | DIASTOLIC BLOOD PRESSURE: 82 MMHG | TEMPERATURE: 98.2 F | BODY MASS INDEX: 22.95 KG/M2 | RESPIRATION RATE: 16 BRPM | SYSTOLIC BLOOD PRESSURE: 113 MMHG | WEIGHT: 160.31 LBS | BODY MASS INDEX: 23.79 KG/M2 | HEIGHT: 70 IN | WEIGHT: 166.19 LBS | TEMPERATURE: 97.4 F | OXYGEN SATURATION: 94 % | HEART RATE: 88 BPM | SYSTOLIC BLOOD PRESSURE: 99 MMHG | DIASTOLIC BLOOD PRESSURE: 71 MMHG | OXYGEN SATURATION: 100 % | RESPIRATION RATE: 20 BRPM

## 2021-05-28 NOTE — PROGRESS NOTES
Patient: LUCAS PETERS     Acct: [id number]     Report: #[id number]  UNIT #: [id number]     : 1960    Encounter Date:2019  PRIMARY CARE: LAWANDA KINGSLEY  ***Signed***  --------------------------------------------------------------------------------------------------------------------  Chief Complaint      Encounter Date      Mar 21, 2019            Primary Care Provider      LAWANDA KINGSLEY            Referring Provider      LAWANDA KINGSLEY            Patient Complaint      Patient is complaining of      Pt here for 1-2 month follow up/PFT, chest ct, 6 min walk results/COPD            VITALS      Height 5 ft 10 in / 177.8 cm      Weight 160 lbs 5 oz / 72.552577 kg      BSA 1.90 m2      BMI 23.0 kg/m2      Temperature 97.4 F / 36.33 C - Oral      Pulse 88      Respirations 16      Blood Pressure 99/82 Sitting, Right Arm      Pulse Oximetry 100%, room air            HPI      The patient presents for two month follow up.  He established care with me in     January and underwent pulmonary function testing and CT scan of the chest as     well as thyroid analysis, vitamin D and acetylcholine esterase receptor antibody    testing as he had profound musculoskeletal weakness and concern for     neuromuscular disease. He returns today stating that in the interim he has seen     a neurologist in [place] and was diagnosed with ALS.  He is being referred to    an ALS specialist and is yet to see this person.  He has a pulmonologist in     [place] by the name of Dr. Wilson who manages the patient's BiPAP and     apparently back in January the patient was sent to me for a second opinion     regarding a neuromuscular disease related respiratory failure.  On further     history obtained from the patient, he tells me that he has tried a trilogy     machine in the past, but did not tolerate it, so he was changed to BiPAP by Dr. Wilson.  The patient used Lonhala for one month, but states that it caused     him to  cough uncontrollably, so he has stopped it. Also, he noted that it was     quite expensive and he was unable to afford it.  He is continuing on Brovana and    Pulmicort twice daily and DuoNebs four times a day. He continues to feel weak.      He is now using a cane to ambulate. He admits to intermittent cough, but mostly     complains.  He admits to intermittent cough, but mostly complains of shortness     of breath at rest and with exertion. His daughter states that his ability to     speak has become worse and it is harder to understand him.  He has not been     rehospitalized since September.              Review of systems as noted.            Past medical, family, social and surgical history reviewed and we have amended     the chart to include ALS.            Medications were reviewed and updated in the chart.            ROS      Constitutional:  Denies: Fatigue, Fever, Weight gain, Weight loss, Chills, Insom    nadine, Other      Respiratory/Breathing:  Complains of: Shortness of air; Denies: Wheezing, Cough,    Hemoptysis, Pleuritic pain, Other      Endocrine:  Denies: Polydipsia, Polyuria, Heat/cold intolerance, Diabetes, Other      Eyes:  Denies: Blurred vision, Vision Changes, Other      Ears, nose, mouth, throat:  Denies: Congestion, Dysphagia, Hearing Changes, Nose    Bleeding, Nasal Discharge, Throat pain, Tinnitus, Other      Cardiovascular:  Denies: Chest Pain, Exertional dyspnea, Peripheral Edema,     Palpitations, Syncope, Wake up Gasping for air, Orthopnea, Tachycardia, Other      Gastrointestinal:  Denies: Abdominal pain/cramping, Bloody stools, Constipation,    Diarrhea, Melena, Nausea, Vomiting, Other      Genitourinary:  Denies: Dysuria, Urinary frequency, Incontinence, Hematuria,     Urgency, Other      Musculoskeletal:  Complains of: Myalgias, Other (muscle weakness); Denies: Joint    Pain, Joint Stiffness, Joint Swelling      Hematologic/lymphatic:  DENIES: Lymphadenopathy, Bruising, Bleeding  tendencies,     Other      Neurologic:  Denies: Headache, Numbness, Weakness, Seizures, Other      Psychiatric:  Denies: Anxiety, Appropriate Effect, Depression, Other      Sleep:  No: Excessive daytime sleep, Morning Headache?, Snoring, Insomnia?, Stop    breathing at sleep?, Other      Integumentary:  Denies: Rash, Dry skin, Skin Warm to Touch, Other            FAMILY/SOCIAL/MEDICAL HX      Surgical History:  Yes: Cholecystectomy; No: Breast Surgery      Heart - Family Hx:  Father, Grandparent      Cancer/Type - Family Hx:  Grandparent (Lung cancer)      Is Father Still Living?:  No      Is Mother Still Living?:  Yes      Social History:  Tobacco Use; No Alcohol Use, No Recreational Drug use      Smoking status:  Former smoker (2 ppd x 40 years/ quit Sept 2018)      Anticoagulation Therapy:  No      Antibiotic Prophylaxis:  No      Medical History:  Yes: Chronic Bronchitis/COPD, Emphysema, Reflux Disease,     Shortness Of Breath, Miscellaneous Medical/oth (ALS); No: Blood Disease,     Chemotherapy/Cancer, Hemorrhoids/Rectal Prob, Sexually Transmitted Dis      Psychiatric History      None            PREVENTION      Hx Influenza Vaccination:  Yes      Date Influenza Vaccine Given:  Oct 1, 2018      Influenza Vaccine Declined:  No      2 or More Falls Past Year?:  No      Fall Past Year with Injury?:  No      Hx Pneumococcal Vaccination:  Yes      Encouraged to follow-up with:  PCP regarding preventative exams.      Chart initiated by      Nancy Mcintosh MA            ALLERGIES/MEDICATIONS      Allergies:        Coded Allergies:             *No Known Allergies (Verified  Allergy, Mild, 3/21/19)      Medications    Last Reconciled on 3/21/19 10:53 by MICHAEL LAZCANO DO      Albuterol/Ipratropium (Duoneb) 3 Ml Ampul.neb      3 ML INH RTQ4H, #180 NEB 9 Refills         Prov: MICHAEL LAZCANO         3/21/19       Cholecalciferol (Vitamin D3) 50,000 Unit Capsule      78058 UNITS PO Q7D, #10 CAP         Prov: MICHAEL LAZCANO          3/21/19       Arformoterol Tartrate (Brovana) 15 Mcg/2 Ml Vial.neb      15 MCG INH RTBID, #60 NEB 12 Refills         Prov: MICHAEL LAZCANO         3/21/19       Neb-Budesonide (Pulmicort) 0.25 Mg/2 Ml Ampul.neb      0.25 MG INH RTBID for 30 Days, #60 NEB 12 Refills         Prov: MICHAEL LAZCANO         3/21/19       Sertraline HCl (Zoloft*) 25 Mg Tablet      25 MG PO QDAY for 30 Days, #30 TAB         Prov: MICHAEL LAZCANO         1/3/19       MDI-Albuterol (Ventolin HFA) 18 Gm Hfa.aer.ad      2 PUFFS INH Q6H PRN for SHORTNESS OF BREATH, #1 MDI 0 Refills         Reported         1/3/19       Melatonin (Melatonin) 10 Mg Tablet      10 MG PO HS, TAB         Reported         1/3/19       Guaifenesin (Humibid La*) 600 Mg Tab.er.12h      600 MG PO BID, TAB         Reported         1/3/19       Pantoprazole (Protonix*) 40 Mg Tablet.dr      40 MG PO QDAY@07, #30 TAB 0 Refills         Reported         1/3/19       Furosemide* (Lasix*) 20 Mg Tablet      20 MG PO QDAY, #30 TAB 0 Refills         Reported         1/3/19      Current Medications      Current Medications Reviewed 3/21/19            EXAM      VITAL SIGNS:  Reviewed.        GENERAL: The patient appears weak, unable to speak in full sentences secondary     to bulbar weakness and oropharyngeal weakness.  He has 3/5 muscle strength     bilaterally upper and lower extremities, ambulates with a cane.        NECK:  Supple without tracheal deviation or lymphadenopathy.  No thyromegaly     appreciated.      LYMPHATICS:  No cervical or supraclavicular lymphadenopathy.      HEENT: Pupils are equal, round and reactive to light. There is no scleral     icterus.  Nares patent without hypertrophy of the turbinates. No erythema of the    passages.  TMs are clear bilaterally with good cone of light. The posterior     pharynx is without  lesions or erythema.      RESPIRATORY:  He has diminished breath sounds bilaterally, very poor aeration     with very poor air movement, increased AP  diameter of the chest. No wheezes,     rhonchi or rales appreciated.        CARDIOVASCULAR:  Regular rate and rhythm.  No murmurs, gallops or rubs.  No     lower extremity edema.  Equal radial pulses.        GI: Soft, nontender, nondistended, no organomegaly.  Bowel sounds present in all    four quadrants.      MUSCULOSKELETAL:  No joint effusions, erythema or warmth over the major joint     systems.      SKIN:  No rashes or lesions.      NEUROLOGIC: Cranial nerves II-XII are intact bilaterally.  Moves all     extremities. Ambulates with ease.      PSYCH:  Appropriate mood and affect.      Vitals      Vitals:             Height 5 ft 10 in / 177.8 cm           Weight 160 lbs 5 oz / 72.904469 kg           BSA 1.90 m2           BMI 23.0 kg/m2           Temperature 97.4 F / 36.33 C - Oral           Pulse 88           Respirations 16           Blood Pressure 99/82 Sitting, Right Arm           Pulse Oximetry 100%, room air            REVIEW      Results Reviewed      PCCS Results Reviewed?:  Yes Prev Lab Results, Yes Prev Radiology Results, Yes     Previous [facility] Records      Lab Results      I reviewed pulmonary function test performed 03/07/2019.  The patient has very     severe obstructive defect with an FEV1 of 28% of predicted, severe restriction     with total lung capacity of 54% of predicted and severe diffusion capacity     decreased at 40% of predicted.  Mean inspiratory pressure was 38% of predicted     which is severely weak.              Reviewed six minute walk test.            Reviewed alpha 1 antitrypsin MM genotype.            Vitamin D level was 20, TSH normal, free T4 normal, acetylcholine esterase     receptor antibodies within normal limits.      Radiographic Results      I reviewed CT scan of the chest performed 03/07/2019.                     [facility]                PACS RADIOLOGY REPORT            Patient: LUCAS PETERS   Acct: #[id number]    Report: #[id number]            UNIT #: [id number]    DOS: 19 1132      INSURANCE:[employer]   ORDER #:[id number]      LOCATION:[facility]     : 1960            PROVIDERS      ADMITTING:     ATTENDING: MICHAEL LAZCANO      FAMILY:  LAWANDA KINGSLEY   ORDERING:  MICHAEL LAZCANO         OTHER:    DICTATING:  Janis Carmona MD            REQ #:[id number]   EXAM:WO - CT CHEST without CONTRAST      REASON FOR EXAM:  COPD      REASON FOR VISIT:  COPD            *******Signed******         PROCEDURE:   CT CHEST WITHOUT CONTRAST             COMPARISON:   None.             INDICATIONS:   COPD unspecified, other muscle spasm.  Request for patient to be     scanned in left       lateral position.             TECHNIQUE:   CT images were created without the administration of contrast     material.               PROTOCOL:     Standard imaging protocol performed                RADIATION:     DLP: 680mGy*cm          Automated exposure control was utilized to minimize radiation dose.              FINDINGS:         The left hemidiaphragm is elevated, and there is partial atelectasis in the     lingula and left lower       lobe.  Right lung nodules measure 4 mm on axial image 27 and 50. Moderate     centrilobular and       paraseptal emphysematous changes are upper lobe predominant.  No pleural or     pericardial effusion is       seen.  Heart size is borderline.  No adenopathy is identified in the chest.      Partially included       solid organs of the upper abdomen appear unremarkable for noncontrast CT.  No     acute or worrisome       osseous abnormality is identified.               CONCLUSION:         1. Elevation of the left hemidiaphragm with associated partial atelectasis in     the left lower lobe       and lingula.      2. Right lung nodules measuring up to 4 mm.  Please see below for followup     recommendation.      3. Moderate emphysematous changes.               The findings include multiple,  incidentally detected, solid pulmonary nodules,     measuring less than       6mm.  2017 guidelines from the Fleischner Society for the follow-up and     management of incidentally       detected indeterminate pulmonary nodules in persons at least 35 years of age d    epend on nodule size       (average length and width) and underlying risk factors (including smoking and     other risk factors).       Please consider the following recommendations after clinical assessment of risk     factors.  For <6mm       solid nodules: In low risk patients, no follow-up required.  If suspicious     morphology or upper lobe       location, consider 12 month follow-up.  In high risk patients, optional CT in 12    months.  -              ARTHUR LESTER MD             Electronically Signed and Approved By: ARTHUR LESTER MD on 3/07/2019 at 12:13                           Until signed, this is an unconfirmed preliminary report that may contain      errors and is subject to change.                                              BARLA:      D:03/07/19 1213            Assessment      Notes      New Medications      * Albuterol/Ipratropium (Duoneb) 3 ML AMPUL.NEB: 3 ML INH RTQ4H #180         Instructions: DIAGNOSIS CODE REQUIRED PRIOR TO PRESCRIBING.      Renewed Medications      * Neb-Budesonide (Pulmicort) 0.25 MG/2 ML AMPUL.NEB:         From: 0.25 MG INH RTBID         To: 0.25 MG INH RTBID 30 Days #60         Instructions: DIAGNOSIS CODE REQUIRED PRIOR TO PRESCRIBING.      * ARFORMOTEROL TARTRATE (Brovana) 15 MCG/2 ML VIAL.NEB: 15 MCG INH RTBID #60         Instructions: DIAGNOSIS CODE REQUIRED PRIOR TO PRESCRIBING.      * Cholecalciferol (Vitamin D3) 50,000 UNIT CAPSULE: 50,000 UNITS PO Q7D #10      Discontinued Medications      * Glycopyrrol/Nebulizer/Accessor (Lonhala Magnair 25 Mcg Starter) 25 MCG/1 ML       VIAL.NEB: 25 MCG INH BID 30 Days #60      * GLYCOPYRROLATE/NEB.ACCESSORIES (Lonhala Magnair 25 Mcg Refill) 25 MCG/1 ML        VIAL.NEB: 25 MCG INH RTBID 30 Days #60         Instructions: Submit to Medicare B if applicable. Call for Diagnosis if        needed.      ASSESSMENT:       1. Very severe COPD without acute exacerbation.      2. ALS.      3.  Neuromuscular myopathy of the diaphragm.      4. Concern for left hemidiaphragm paralysis.      5. Former heavy tobacco abuse in remission.      6. Chronic hypercapnic respiratory failure on nightly BiPAP.      7. Depression.      8. Gastroesophageal reflux disease.      9.  Depression.      10. Vitamin D deficiency.              PLAN:        1.  The patient did not tolerate Lonhala. This caused an increased cough. He     cannot take the inhaler secondary to his profound myopathy of the diaphragm.      Continue Brovana and Pulmicort twice daily.  Go back to DuoNebs every 4 hours.      2.  The patient will follow up with an ALS specialist in [place].      3. He will follow up with his primary pulmonologist in [place], Dr. Wilson.        4. Continue vitamin D 50,000 units one tab once weekly.  I have given refills of    this today.      5.  I have also refilled his Brovana, Pulmicort and DuoNebs.      6. I did  the patient regarding the progression of ALS and told him he     needs to consider his wishes regarding chronic tracheostomy in the future. He     will think about this and discuss it with his primary pulmonologist.      7. He is up-to-date on his immunizations.      8. I do recommend a follow up CT scan of the chest in one years time. He had 4     mm nodules which are likely benign, but given his smoking history will need to     be followed.        9. Follow up as needed.            Patient Education      Patient Education Provided:  COPD, How to use a Nebulizer, Lung Cancer      Time Spent:  > 50% /[facility]                 Disclaimer: Converted document may not contain table formatting or lab diagrams. Please see [employer] for the  authenticated document.

## 2021-05-28 NOTE — PROGRESS NOTES
Patient: LUCAS PETERS     Acct: [id number]     Report: #[id number]  UNIT #: [id number]     : 1960    Encounter Date:2019  PRIMARY CARE: LAWANDA KINGSLEY  ***Signed***  --------------------------------------------------------------------------------------------------------------------  Chief Complaint      Encounter Date      Yahir 3, 2019            Primary Care Provider            [facility]            Referring Provider            [facility]            Patient Complaint      Patient is complaining of      New pt here for COPD            VITALS      Height 5 ft 10 in / 177.8 cm      Weight 166 lbs 3 oz / 75.194041 kg      BSA 1.93 m2      BMI 23.8 kg/m2      Temperature 98.2 F / 36.78 C - Oral      Pulse 97      Respirations 20      Blood Pressure 113/71 Sitting, Right Arm      Pulse Oximetry 94%, room air      Initial Exhaled Nitrous Oxide      Date:  Yahir 3, 2019      Exhaled Nitrous Oxide Results:  0 (Pt could not do)            HPI      The patient was referred by Lawanda Kingsley for evaluation of COPD. The patient is     accompanied by his daughter and has given me permission to speak in front of her    today. This gentleman has a heavy tobacco use history. He smoked two packs per     day for 40 years and quit in 2018 after a prolonged hospitalization for     shortness of breath and respiratory failure.  He was in the ICU for acute     hypercapnia and diagnosed with pneumonia. He was discharged to [facility] and has been undergoing voice therapy. The patient complains of severe     fatigue and weakness. He is on a BiPAP that he wears at night. He no longer     requires supplemental oxygen. He has been diagnosed with COPD and is currently     taking Brovana and Pulmicort nebulizers twice daily. In addition, he has DuoNebs    used four times a day. There is no pulmonary function testing or imaging for my     review today unfortunately. He denies chronic cough  or wheeze, just simply     shortness of breath with sitting, worse with standing or walking, especially at     night.              Review of systems: Muscle spasms.              PAST MEDICAL HISTORY:      1. COPD.      2.  Progressive weakness has yet to be evaluated by neurology.      3. Chronic shortness of breath.      4. Former tobacco abuse with cigarettes in remission.      5. Gastroesophageal reflux disease.            PAST SURGICAL HISTORY:  Cholecystectomy.            FAMILY HISTORY: Father with a history of heart disease, mother is still living.            SOCIAL HISTORY: Former heavy tobacco user, two packs per day x40 years, quit in     09/2018. Denies recreational drug use or alcohol use.  He is unemployed.            MEDICATIONS:  Reviewed and updated in the chart.            ROS      Constitutional:  Complains of: Fatigue, Weight loss; Denies: Fever, Weight gain,    Chills, Insomnia, Other      Respiratory/Breathing:  Complains of: Shortness of air; Denies: Wheezing, Cough,    Hemoptysis, Pleuritic pain, Other      Endocrine:  Denies: Polydipsia, Polyuria, Heat/cold intolerance, Diabetes, Other      Eyes:  Denies: Blurred vision, Vision Changes, Other      Ears, nose, mouth, throat:  Complains of: Other (weak voice, changes of voice);     Denies: Congestion, Dysphagia, Hearing Changes, Nose Bleeding, Nasal Discharge,     Throat pain, Tinnitus      Cardiovascular:  Complains of: Exertional dyspnea; Denies: Chest Pain,     Peripheral Edema, Palpitations, Syncope, Wake up Gasping for air, Orthopnea,     Tachycardia, Other      Gastrointestinal:  Denies: Abdominal pain/cramping, Bloody stools, Constipation,    Diarrhea, Melena, Nausea, Vomiting, Other      Genitourinary:  Denies: Dysuria, Urinary frequency, Incontinence, Hematuria,     Urgency, Other      Musculoskeletal:  Complains of: Myalgias, Other (profound weakness, muscle     spasms); Denies: Joint Pain, Joint Stiffness, Joint Swelling       Hematologic/lymphatic:  DENIES: Lymphadenopathy, Bruising, Bleeding tendencies,     Other      Neurologic:  Denies: Headache, Numbness, Weakness, Seizures, Other      Psychiatric:  Denies: Anxiety, Appropriate Effect, Depression, Other      Sleep:  No: Excessive daytime sleep, Morning Headache?, Snoring, Insomnia?, Stop    breathing at sleep?, Other      Integumentary:  Denies: Rash, Dry skin, Skin Warm to Touch, Other            FAMILY/SOCIAL/MEDICAL HX      Surgical History:  Yes: Cholecystectomy; No: Breast Surgery      Heart - Family Hx:  Father, Grandparent      Is Father Still Living?:  No      Is Mother Still Living?:  Yes      Social History:  Tobacco Use; No Alcohol Use, No Recreational Drug use      Smoking status:  Former smoker (2 ppd x 40 years/ quit September 14, 2018)      Anticoagulation Therapy:  No      Antibiotic Prophylaxis:  No      Medical History:  Yes: Chronic Bronchitis/COPD, GERD, Shortness Of Breath; No:     Blood Disease, Chemotherapy/Cancer, Hemorrhoids/Rectal Prob, Sexually     Transmitted Dis      Psychiatric History      None            PREVENTION      Hx Influenza Vaccination:  Yes      Date Influenza Vaccine Given:  Oct 1, 2018      Influenza Vaccine Declined:  No      2 or More Falls Past Year?:  No      Fall Past Year with Injury?:  No      Hx Pneumococcal Vaccination:  Yes      Encouraged to follow-up with:  PCP regarding preventative exams.      Chart initiated by      Nancy Mcintosh MA            ALLERGIES/MEDICATIONS      Allergies:        Coded Allergies:             *No Known Allergies (Verified  Allergy, Mild, 1/3/19)      Medications    Last Reconciled on 1/3/19 10:37 by MICHAEL LAZCANO DO      MDI-Albuterol (Ventolin HFA) 18 Gm Hfa.aer.ad      2 PUFFS INH Q6H PRN for SHORTNESS OF BREATH, #1 MDI 0 Refills         Reported         1/3/19       Arformoterol Tartrate (Brovana) 15 Mcg/2 Ml Vial.neb      15 MCG INH RTBID, #60 NEB         Reported         1/3/19        Neb-Budesonide (Pulmicort) 0.25 Mg/2 Ml Ampul.neb      0.25 MG INH RTBID, NEB         Reported         1/3/19       Ipratropium Bromide Neb (Ipratropium Bromide Neb) 0.5 Mg/2.5 Ml Nebu      0.5 MG INH RTQ6H, #120 NEB 0 Refills         Reported         1/3/19       Melatonin (Melatonin) 10 Mg Tablet      10 MG PO HS, TAB         Reported         1/3/19       Guaifenesin (Humibid La*) 600 Mg Tab.er.12h      600 MG PO BID, TAB         Reported         1/3/19       Pantoprazole (Protonix*) 40 Mg Tablet.dr      40 MG PO QDAY@07, #30 TAB 0 Refills         Reported         1/3/19       Furosemide* (Lasix*) 20 Mg Tablet      20 MG PO QDAY, #30 TAB 0 Refills         Reported         1/3/19      Current Medications      Current Medications Reviewed 1/3/19            EXAM      VITAL SIGNS:  Reviewed.        GENERAL: The patient appears fatigued and weak. He has difficulty speaking with     a garbled type speech.;       NECK:  Supple without tracheal deviation or lymphadenopathy.  No thyromegaly     appreciated.      LYMPHATICS:  No cervical or supraclavicular lymphadenopathy.      HEENT: Pupils are equal, round and reactive to light. There is no scleral     icterus.  Nares patent without hypertrophy of the turbinates. No erythema of the    passages.  TMs are clear bilaterally with good cone of light. The posterior     pharynx is without  lesions or erythema.      RESPIRATORY:  He has increased AP diameter of the chest, prolonged expiratory     phase of respiration, very poor aeration with no adventitious sounds     appreciated.        CARDIOVASCULAR:  Regular rate and rhythm.  No murmurs, gallops or rubs.  No     lower extremity edema.  Equal radial pulses.        GI: Soft, nontender, nondistended, no organomegaly.  Bowel sounds present in all    four quadrants.      MUSCULOSKELETAL:  Muscle atrophy in the extremities.  No joint effusions,     erythema or warmth over the major joint systems.      SKIN:  No rashes or lesions.       NEUROLOGIC: Cranial nerves II-XII are intact bilaterally.  Moves all     extremities. Ambulates with ease.      PSYCH:  Appropriate mood and affect.      Vitals      Vitals:             Height 5 ft 10 in / 177.8 cm           Weight 166 lbs 3 oz / 75.616053 kg           BSA 1.93 m2           BMI 23.8 kg/m2           Temperature 98.2 F / 36.78 C - Oral           Pulse 97           Respirations 20           Blood Pressure 113/71 Sitting, Right Arm           Pulse Oximetry 94%, room air            REVIEW      Results Reviewed      PCCS Results Reviewed?:  Yes Prev Lab Results, Yes Prev Radiology Results, Yes     Previous [facility] Records      Lab Results      There are no records for my review.            Assessment      COPD (chronic obstructive pulmonary disease) - J44.9            Weakness - R53.1            Myopathy - G72.9            Muscle spasm - M62.838            Notes      New Medications      * Furosemide* (Lasix*) 20 MG TABLET: 20 MG PO QDAY #30      * Pantoprazole (Protonix*) 40 MG TABLET.DR: 40 MG PO QDAY@07 #30         Instructions: Take on an empty stomach.      * Guaifenesin (Humibid La*) 600 MG TAB.ER.12H: 600 MG PO BID      * MELATONIN (Melatonin) 10 MG TABLET: 10 MG PO HS      * Neb-Budesonide (Pulmicort) 0.25 MG/2 ML AMPUL.NEB: 0.25 MG INH RTBID         Instructions: DIAGNOSIS CODE REQUIRED PRIOR TO PRESCRIBING.      * ARFORMOTEROL TARTRATE (Brovana) 15 MCG/2 ML VIAL.NEB: 15 MCG INH RTBID #60         Instructions: DIAGNOSIS CODE REQUIRED PRIOR TO PRESCRIBING.      * MDI-Albuterol (Ventolin HFA) 18 GM HFA.AER.AD: 2 PUFFS INH Q6H PRN SHORTNESS       OF BREATH #1      * Sertraline HCl (Zoloft*) 25 MG TABLET: 25 MG PO QDAY 30 Days #30      * Glycopyrrol/Nebulizer/Accessor (Lonhala Magnair 25 Mcg Starter) 25 MCG/1 ML       VIAL.NEB: 25 MCG INH BID 30 Days #60      * GLYCOPYRROLATE/NEB.ACCESSORIES (Lonhala Magnair 25 Mcg Refill) 25 MCG/1 ML       VIAL.NEB: 25 MCG INH RTBID 30 Days #60          Instructions: Submit to Medicare B if applicable. Call for Diagnosis if        needed.      New Diagnostics      * Acetylcholine Rec. Ab-Binding, Routine         Dx: Weakness - R53.1      * Vitamin D Level, Routine         Dx: COPD (chronic obstructive pulmonary disease) - J44.9      * Thyroid Profile, Routine         Dx: COPD (chronic obstructive pulmonary disease) - J44.9      * Chest W/O Cont CT, Routine         Dx: COPD (chronic obstructive pulmonary disease) - J44.9      * 6 Min Walk With Pulse Ox, Routine         Dx: COPD (chronic obstructive pulmonary disease) - J44.9      * PFT-Comp, PrePost,DLCO,BodyBox, Routine         Dx: COPD (chronic obstructive pulmonary disease) - J44.9      New Office Procedures      * Follow Up Appointment, 2 Months         Dx: COPD (chronic obstructive pulmonary disease) - J44.9      ASSESSMENT:       1.  Former heavy tobacco abuse in remission.      2. History of COPD.      3.  History of hypoxic and hypercapnic respiratory failure on BiPAP at night.      4. Profound musculoskeletal weakness, concern for neuromuscular disease,     undiagnosed.      5.  Gastroesophageal reflux disease.      6. Depression.            PLAN:      1.  I have ordered baseline PFTs, six minute walk test and maximal inspiratory     pressures and expiratory pressures with his PFTs. I have sent vitamin D level     and acetylcholine esterase receptor antibodies to do a baseline workup for any     neuromuscular disease.      2. Continue Brovana and Pulmicort. Stop DuoNebs and start Lonhala Magnair long     acting muscarinic agonist twice daily.      3.  The patient was very tearful today and asked if he could be started on a low    dose antidepressant. I have called in 25 mg po once daily x30 days, but     recommended he follow up with Dr. Small in regards to continuing this     medication.      4.  I congratulated the patient on his smoking cessation.      5. I did urge the patient's daughter to take her dad  for a complete neurological    evaluation as he does have noticeable weakness, myopathy and muscle spasms noted    on exam.         6.  An alpha 1 antitrypsin test was performed today.  We will follow up the     results of this at his follow up visit.      7. Obtain baseline CT scan of the chest to evaluate for underlying pulmonary     parenchymal abnormalities given his extensive shortness of breath and heavy     smoking history.  He believes he is up-to-date on all of his immunizations.      These were given at the time of discharge back in September.      8.  Continue albuterol as needed.      9.  Patient was unable to perform FENO test in our office today despite multiple    tries.        10.  Return in two months to review above testing.            Patient Education      Patient Education Provided:  COPD, Lung Cancer      Time Spent:  > 50% /[facility]            Patient Education:        Chronic Obstructive Pulmonary Disease      Other Education:  neuromuscular disorders                 Disclaimer: Converted document may not contain table formatting or lab diagrams. Please see [facility] for the authenticated document.

## (undated) DEVICE — SPNG GZ WOVN 4X4IN 12PLY 10/BX STRL

## (undated) DEVICE — Device: Brand: DEFENDO AIR/WATER/SUCTION AND BIOPSY VALVE

## (undated) DEVICE — CANN NASL CO2 TRULINK W/O2 A/

## (undated) DEVICE — BNDR ABD 4PANEL 12IN MED/LG

## (undated) DEVICE — PERCUTANEOUS ENDOSCOPIC GASTROSTOMY KIT: Brand: ENDOVIVE SAFETY PEG KIT

## (undated) DEVICE — TUBING, SUCTION, 1/4" X 10', STRAIGHT: Brand: MEDLINE

## (undated) DEVICE — BITEBLOCK OMNI BLOC